# Patient Record
Sex: MALE | Race: WHITE | NOT HISPANIC OR LATINO | Employment: OTHER | URBAN - METROPOLITAN AREA
[De-identification: names, ages, dates, MRNs, and addresses within clinical notes are randomized per-mention and may not be internally consistent; named-entity substitution may affect disease eponyms.]

---

## 2019-07-13 PROBLEM — I63.412 STROKE DUE TO EMBOLISM OF LEFT MIDDLE CEREBRAL ARTERY: Status: ACTIVE | Noted: 2019-07-13

## 2019-07-14 ENCOUNTER — HOSPITAL ENCOUNTER (INPATIENT)
Facility: HOSPITAL | Age: 49
LOS: 15 days | Discharge: NURSING, REHAB OR LONG-TERM CARE | DRG: 023 | End: 2019-07-29
Attending: EMERGENCY MEDICINE | Admitting: ANESTHESIOLOGY
Payer: OTHER GOVERNMENT

## 2019-07-14 DIAGNOSIS — I48.0 PAF (PAROXYSMAL ATRIAL FIBRILLATION): ICD-10-CM

## 2019-07-14 DIAGNOSIS — G81.91 HEMIPARESIS, RIGHT: ICD-10-CM

## 2019-07-14 DIAGNOSIS — I63.412 STROKE DUE TO EMBOLISM OF LEFT MIDDLE CEREBRAL ARTERY: ICD-10-CM

## 2019-07-14 DIAGNOSIS — Z95.818 PRESENCE OF WATCHMAN LEFT ATRIAL APPENDAGE CLOSURE DEVICE: ICD-10-CM

## 2019-07-14 DIAGNOSIS — Z53.09 MRI CONTRAINDICATED DUE TO METAL IMPLANT: ICD-10-CM

## 2019-07-14 DIAGNOSIS — I63.9 CVA (CEREBRAL VASCULAR ACCIDENT): ICD-10-CM

## 2019-07-14 DIAGNOSIS — T85.9XXA: ICD-10-CM

## 2019-07-14 DIAGNOSIS — I63.9 EMBOLIC STROKE: ICD-10-CM

## 2019-07-14 DIAGNOSIS — R74.01 TRANSAMINITIS: ICD-10-CM

## 2019-07-14 DIAGNOSIS — Z00.00 EVALUATION BY MEDICAL SERVICE REQUIRED: ICD-10-CM

## 2019-07-14 DIAGNOSIS — R47.01 APHASIA: ICD-10-CM

## 2019-07-14 DIAGNOSIS — I63.412 CEREBRAL INFARCTION DUE TO EMBOLISM OF LEFT MIDDLE CEREBRAL ARTERY: Primary | ICD-10-CM

## 2019-07-14 DIAGNOSIS — I63.9 STROKE: ICD-10-CM

## 2019-07-14 PROBLEM — F10.10 ETOH ABUSE: Status: ACTIVE | Noted: 2019-07-14

## 2019-07-14 PROBLEM — Z92.82 S/P ADMN TPA IN DIFF FAC W/N LAST 24 HR BEF ADM TO CRNT FAC: Status: ACTIVE | Noted: 2019-07-14

## 2019-07-14 LAB
ABO GROUP BLD: NORMAL
ALBUMIN SERPL BCP-MCNC: 3.7 G/DL (ref 3.5–5.2)
ALLENS TEST: ABNORMAL
ALP SERPL-CCNC: 50 U/L (ref 55–135)
ALT SERPL W/O P-5'-P-CCNC: 20 U/L (ref 10–44)
ANION GAP SERPL CALC-SCNC: 10 MMOL/L (ref 8–16)
ASCENDING AORTA: 3.83 CM
AST SERPL-CCNC: 33 U/L (ref 10–40)
AV INDEX (PROSTH): 0.85
AV MEAN GRADIENT: 5 MMHG
AV PEAK GRADIENT: 8 MMHG
AV VALVE AREA: 4.02 CM2
AV VELOCITY RATIO: 0.86
BASOPHILS # BLD AUTO: 0.04 K/UL (ref 0–0.2)
BASOPHILS NFR BLD: 0.4 % (ref 0–1.9)
BILIRUB SERPL-MCNC: 0.4 MG/DL (ref 0.1–1)
BILIRUB UR QL STRIP: NEGATIVE
BLD GP AB SCN CELLS X3 SERPL QL: NORMAL
BSA FOR ECHO PROCEDURE: 2.19 M2
BUN SERPL-MCNC: 11 MG/DL (ref 6–20)
CALCIUM SERPL-MCNC: 8.8 MG/DL (ref 8.7–10.5)
CHLORIDE SERPL-SCNC: 104 MMOL/L (ref 95–110)
CHOLEST SERPL-MCNC: 159 MG/DL (ref 120–199)
CHOLEST/HDLC SERPL: 2.7 {RATIO} (ref 2–5)
CLARITY UR REFRACT.AUTO: CLEAR
CO2 SERPL-SCNC: 24 MMOL/L (ref 23–29)
COLOR UR AUTO: YELLOW
CREAT SERPL-MCNC: 1.1 MG/DL (ref 0.5–1.4)
CV ECHO LV RWT: 0.27 CM
DELSYS: ABNORMAL
DIFFERENTIAL METHOD: ABNORMAL
DOP CALC AO PEAK VEL: 1.38 M/S
DOP CALC AO VTI: 27 CM
DOP CALC LVOT AREA: 4.8 CM2
DOP CALC LVOT DIAMETER: 2.46 CM
DOP CALC LVOT PEAK VEL: 1.18 M/S
DOP CALC LVOT STROKE VOLUME: 108.45 CM3
DOP CALCLVOT PEAK VEL VTI: 22.83 CM
E WAVE DECELERATION TIME: 155.75 MSEC
E/A RATIO: 2.22
E/E' RATIO: 7.85 M/S
ECHO LV POSTERIOR WALL: 0.68 CM (ref 0.6–1.1)
EOSINOPHIL # BLD AUTO: 0 K/UL (ref 0–0.5)
EOSINOPHIL NFR BLD: 0.3 % (ref 0–8)
ERYTHROCYTE [DISTWIDTH] IN BLOOD BY AUTOMATED COUNT: 13.1 % (ref 11.5–14.5)
ERYTHROCYTE [SEDIMENTATION RATE] IN BLOOD BY WESTERGREN METHOD: 16 MM/H
EST. GFR  (AFRICAN AMERICAN): >60 ML/MIN/1.73 M^2
EST. GFR  (NON AFRICAN AMERICAN): >60 ML/MIN/1.73 M^2
ESTIMATED AVG GLUCOSE: 100 MG/DL (ref 68–131)
ETHANOL SERPL-MCNC: 15 MG/DL
FRACTIONAL SHORTENING: 36 % (ref 28–44)
GLUCOSE SERPL-MCNC: 102 MG/DL (ref 70–110)
GLUCOSE UR QL STRIP: NEGATIVE
HBA1C MFR BLD HPLC: 5.1 % (ref 4–5.6)
HCO3 UR-SCNC: 22.1 MMOL/L (ref 24–28)
HCT VFR BLD AUTO: 39.6 % (ref 40–54)
HDLC SERPL-MCNC: 58 MG/DL (ref 40–75)
HDLC SERPL: 36.5 % (ref 20–50)
HGB BLD-MCNC: 13.4 G/DL (ref 14–18)
HGB UR QL STRIP: NEGATIVE
IMM GRANULOCYTES # BLD AUTO: 0.08 K/UL (ref 0–0.04)
IMM GRANULOCYTES NFR BLD AUTO: 0.8 % (ref 0–0.5)
INTERVENTRICULAR SEPTUM: 0.65 CM (ref 0.6–1.1)
IVRT: 0.09 MSEC
KETONES UR QL STRIP: ABNORMAL
LA MAJOR: 5.05 CM
LA MINOR: 5.15 CM
LA WIDTH: 4.09 CM
LDLC SERPL CALC-MCNC: 91.6 MG/DL (ref 63–159)
LEFT ATRIUM SIZE: 3.19 CM
LEFT ATRIUM VOLUME INDEX: 25.7 ML/M2
LEFT ATRIUM VOLUME: 56.55 CM3
LEFT INTERNAL DIMENSION IN SYSTOLE: 3.19 CM (ref 2.1–4)
LEFT VENTRICLE DIASTOLIC VOLUME INDEX: 53.63 ML/M2
LEFT VENTRICLE DIASTOLIC VOLUME: 118.07 ML
LEFT VENTRICLE MASS INDEX: 49 G/M2
LEFT VENTRICLE SYSTOLIC VOLUME INDEX: 18.5 ML/M2
LEFT VENTRICLE SYSTOLIC VOLUME: 40.65 ML
LEFT VENTRICULAR INTERNAL DIMENSION IN DIASTOLE: 5 CM (ref 3.5–6)
LEFT VENTRICULAR MASS: 107.63 G
LEUKOCYTE ESTERASE UR QL STRIP: NEGATIVE
LV LATERAL E/E' RATIO: 7.85 M/S
LV SEPTAL E/E' RATIO: 7.85 M/S
LYMPHOCYTES # BLD AUTO: 0.8 K/UL (ref 1–4.8)
LYMPHOCYTES NFR BLD: 7.9 % (ref 18–48)
MAGNESIUM SERPL-MCNC: 1.8 MG/DL (ref 1.6–2.6)
MCH RBC QN AUTO: 32.8 PG (ref 27–31)
MCHC RBC AUTO-ENTMCNC: 33.8 G/DL (ref 32–36)
MCV RBC AUTO: 97 FL (ref 82–98)
MODE: ABNORMAL
MONOCYTES # BLD AUTO: 0.4 K/UL (ref 0.3–1)
MONOCYTES NFR BLD: 4.5 % (ref 4–15)
MV PEAK A VEL: 0.46 M/S
MV PEAK E VEL: 1.02 M/S
NEUTROPHILS # BLD AUTO: 8.3 K/UL (ref 1.8–7.7)
NEUTROPHILS NFR BLD: 86.1 % (ref 38–73)
NITRITE UR QL STRIP: NEGATIVE
NONHDLC SERPL-MCNC: 101 MG/DL
NRBC BLD-RTO: 0 /100 WBC
PCO2 BLDA: 33.4 MMHG (ref 35–45)
PH SMN: 7.43 [PH] (ref 7.35–7.45)
PH UR STRIP: 6 [PH] (ref 5–8)
PHOSPHATE SERPL-MCNC: 3.2 MG/DL (ref 2.7–4.5)
PISA TR MAX VEL: 2.51 M/S
PLATELET # BLD AUTO: 157 K/UL (ref 150–350)
PLATELET BLD QL SMEAR: ABNORMAL
PMV BLD AUTO: 10.3 FL (ref 9.2–12.9)
PO2 BLDA: 107 MMHG (ref 80–100)
POC BE: -2 MMOL/L
POC SATURATED O2: 98 % (ref 95–100)
POC TCO2: 23 MMOL/L (ref 23–27)
POCT GLUCOSE: 109 MG/DL (ref 70–110)
POCT GLUCOSE: 85 MG/DL (ref 70–110)
POCT GLUCOSE: 94 MG/DL (ref 70–110)
POCT GLUCOSE: 98 MG/DL (ref 70–110)
POTASSIUM SERPL-SCNC: 4.3 MMOL/L (ref 3.5–5.1)
PROT SERPL-MCNC: 6.5 G/DL (ref 6–8.4)
PROT UR QL STRIP: NEGATIVE
PULM VEIN S/D RATIO: 1.24
PV PEAK D VEL: 0.38 M/S
PV PEAK S VEL: 0.47 M/S
RA MAJOR: 5 CM
RA PRESSURE: 8 MMHG
RA WIDTH: 3.67 CM
RBC # BLD AUTO: 4.08 M/UL (ref 4.6–6.2)
RH BLD: NORMAL
RIGHT VENTRICULAR END-DIASTOLIC DIMENSION: 3.81 CM
RV TISSUE DOPPLER FREE WALL SYSTOLIC VELOCITY 1 (APICAL 4 CHAMBER VIEW): 15.92 CM/S
SAMPLE: ABNORMAL
SINUS: 3.8 CM
SITE: ABNORMAL
SODIUM SERPL-SCNC: 138 MMOL/L (ref 136–145)
SP GR UR STRIP: >=1.03 (ref 1–1.03)
SP02: 95
STJ: 3.25 CM
TDI LATERAL: 0.13 M/S
TDI SEPTAL: 0.13 M/S
TDI: 0.13 M/S
TR MAX PG: 25 MMHG
TRICUSPID ANNULAR PLANE SYSTOLIC EXCURSION: 2.58 CM
TRIGL SERPL-MCNC: 47 MG/DL (ref 30–150)
TSH SERPL DL<=0.005 MIU/L-ACNC: 0.53 UIU/ML (ref 0.4–4)
TV REST PULMONARY ARTERY PRESSURE: 33 MMHG
URN SPEC COLLECT METH UR: ABNORMAL
WBC # BLD AUTO: 9.69 K/UL (ref 3.9–12.7)

## 2019-07-14 PROCEDURE — 80320 DRUG SCREEN QUANTALCOHOLS: CPT

## 2019-07-14 PROCEDURE — 81003 URINALYSIS AUTO W/O SCOPE: CPT

## 2019-07-14 PROCEDURE — 63600175 PHARM REV CODE 636 W HCPCS: Performed by: NURSE PRACTITIONER

## 2019-07-14 PROCEDURE — 25000003 PHARM REV CODE 250: Performed by: NURSE PRACTITIONER

## 2019-07-14 PROCEDURE — 97530 THERAPEUTIC ACTIVITIES: CPT

## 2019-07-14 PROCEDURE — 96374 THER/PROPH/DIAG INJ IV PUSH: CPT

## 2019-07-14 PROCEDURE — 83036 HEMOGLOBIN GLYCOSYLATED A1C: CPT

## 2019-07-14 PROCEDURE — 99291 CRITICAL CARE FIRST HOUR: CPT | Mod: ,,, | Performed by: PSYCHIATRY & NEUROLOGY

## 2019-07-14 PROCEDURE — 94761 N-INVAS EAR/PLS OXIMETRY MLT: CPT

## 2019-07-14 PROCEDURE — 80053 COMPREHEN METABOLIC PANEL: CPT

## 2019-07-14 PROCEDURE — 63600175 PHARM REV CODE 636 W HCPCS: Performed by: RADIOLOGY

## 2019-07-14 PROCEDURE — 84443 ASSAY THYROID STIM HORMONE: CPT

## 2019-07-14 PROCEDURE — 99254 PR INITIAL INPATIENT CONSULT,LEVL IV: ICD-10-PCS | Mod: ,,, | Performed by: NEUROLOGICAL SURGERY

## 2019-07-14 PROCEDURE — 86850 RBC ANTIBODY SCREEN: CPT

## 2019-07-14 PROCEDURE — A4217 STERILE WATER/SALINE, 500 ML: HCPCS | Performed by: NURSE PRACTITIONER

## 2019-07-14 PROCEDURE — 99223 1ST HOSP IP/OBS HIGH 75: CPT | Mod: ,,, | Performed by: PSYCHIATRY & NEUROLOGY

## 2019-07-14 PROCEDURE — 96372 THER/PROPH/DIAG INJ SC/IM: CPT

## 2019-07-14 PROCEDURE — 99291 CRITICAL CARE FIRST HOUR: CPT | Mod: ,,, | Performed by: EMERGENCY MEDICINE

## 2019-07-14 PROCEDURE — 20000000 HC ICU ROOM

## 2019-07-14 PROCEDURE — 84100 ASSAY OF PHOSPHORUS: CPT

## 2019-07-14 PROCEDURE — 99223 PR INITIAL HOSPITAL CARE,LEVL III: ICD-10-PCS | Mod: ,,, | Performed by: PSYCHIATRY & NEUROLOGY

## 2019-07-14 PROCEDURE — 83735 ASSAY OF MAGNESIUM: CPT

## 2019-07-14 PROCEDURE — 97162 PT EVAL MOD COMPLEX 30 MIN: CPT

## 2019-07-14 PROCEDURE — 86900 BLOOD TYPING SEROLOGIC ABO: CPT

## 2019-07-14 PROCEDURE — 92610 EVALUATE SWALLOWING FUNCTION: CPT

## 2019-07-14 PROCEDURE — 36415 COLL VENOUS BLD VENIPUNCTURE: CPT

## 2019-07-14 PROCEDURE — 80061 LIPID PANEL: CPT

## 2019-07-14 PROCEDURE — 99285 EMERGENCY DEPT VISIT HI MDM: CPT | Mod: 25

## 2019-07-14 PROCEDURE — 86901 BLOOD TYPING SEROLOGIC RH(D): CPT

## 2019-07-14 PROCEDURE — 99291 PR CRITICAL CARE, E/M 30-74 MINUTES: ICD-10-PCS | Mod: ,,, | Performed by: PSYCHIATRY & NEUROLOGY

## 2019-07-14 PROCEDURE — 85025 COMPLETE CBC W/AUTO DIFF WBC: CPT

## 2019-07-14 PROCEDURE — 99291 PR CRITICAL CARE, E/M 30-74 MINUTES: ICD-10-PCS | Mod: ,,, | Performed by: EMERGENCY MEDICINE

## 2019-07-14 PROCEDURE — 25500020 PHARM REV CODE 255: Performed by: ANESTHESIOLOGY

## 2019-07-14 PROCEDURE — 99254 IP/OBS CNSLTJ NEW/EST MOD 60: CPT | Mod: ,,, | Performed by: NEUROLOGICAL SURGERY

## 2019-07-14 RX ORDER — THIAMINE HCL 100 MG
100 TABLET ORAL DAILY
Status: DISCONTINUED | OUTPATIENT
Start: 2019-07-14 | End: 2019-07-22

## 2019-07-14 RX ORDER — FENTANYL CITRATE 50 UG/ML
INJECTION, SOLUTION INTRAMUSCULAR; INTRAVENOUS CODE/TRAUMA/SEDATION MEDICATION
Status: COMPLETED | OUTPATIENT
Start: 2019-07-14 | End: 2019-07-14

## 2019-07-14 RX ORDER — FOLIC ACID 1 MG/1
1 TABLET ORAL DAILY
Status: DISCONTINUED | OUTPATIENT
Start: 2019-07-14 | End: 2019-07-22

## 2019-07-14 RX ORDER — ONDANSETRON 2 MG/ML
INJECTION INTRAMUSCULAR; INTRAVENOUS
Status: COMPLETED
Start: 2019-07-14 | End: 2019-07-14

## 2019-07-14 RX ORDER — MIDAZOLAM HYDROCHLORIDE 1 MG/ML
INJECTION INTRAMUSCULAR; INTRAVENOUS CODE/TRAUMA/SEDATION MEDICATION
Status: COMPLETED | OUTPATIENT
Start: 2019-07-14 | End: 2019-07-14

## 2019-07-14 RX ORDER — SODIUM CHLORIDE 0.9 % (FLUSH) 0.9 %
10 SYRINGE (ML) INJECTION
Status: DISCONTINUED | OUTPATIENT
Start: 2019-07-14 | End: 2019-07-29 | Stop reason: HOSPADM

## 2019-07-14 RX ORDER — IODIXANOL 320 MG/ML
100 INJECTION, SOLUTION INTRAVASCULAR
Status: COMPLETED | OUTPATIENT
Start: 2019-07-14 | End: 2019-07-14

## 2019-07-14 RX ORDER — NICARDIPINE HYDROCHLORIDE 0.2 MG/ML
5 INJECTION INTRAVENOUS CONTINUOUS
Status: DISCONTINUED | OUTPATIENT
Start: 2019-07-14 | End: 2019-07-18

## 2019-07-14 RX ORDER — MANNITOL 20 G/100ML
25 INJECTION, SOLUTION INTRAVENOUS EVERY 6 HOURS
Status: DISCONTINUED | OUTPATIENT
Start: 2019-07-15 | End: 2019-07-15

## 2019-07-14 RX ORDER — ONDANSETRON 2 MG/ML
8 INJECTION INTRAMUSCULAR; INTRAVENOUS EVERY 8 HOURS PRN
Status: DISCONTINUED | OUTPATIENT
Start: 2019-07-14 | End: 2019-07-29 | Stop reason: HOSPADM

## 2019-07-14 RX ORDER — MANNITOL 20 G/100ML
25 INJECTION, SOLUTION INTRAVENOUS ONCE
Status: COMPLETED | OUTPATIENT
Start: 2019-07-14 | End: 2019-07-14

## 2019-07-14 RX ORDER — ATORVASTATIN CALCIUM 20 MG/1
40 TABLET, FILM COATED ORAL DAILY
Status: DISCONTINUED | OUTPATIENT
Start: 2019-07-14 | End: 2019-07-24

## 2019-07-14 RX ADMIN — ATORVASTATIN CALCIUM 40 MG: 20 TABLET, FILM COATED ORAL at 08:07

## 2019-07-14 RX ADMIN — MIDAZOLAM HYDROCHLORIDE 2 MG: 1 INJECTION, SOLUTION INTRAMUSCULAR; INTRAVENOUS at 01:07

## 2019-07-14 RX ADMIN — FOLIC ACID: 5 INJECTION, SOLUTION INTRAMUSCULAR; INTRAVENOUS; SUBCUTANEOUS at 03:07

## 2019-07-14 RX ADMIN — IODIXANOL 35 ML: 320 INJECTION, SOLUTION INTRAVASCULAR at 02:07

## 2019-07-14 RX ADMIN — MANNITOL 25 G: 20 INJECTION, SOLUTION INTRAVENOUS at 07:07

## 2019-07-14 RX ADMIN — Medication 100 MG: at 08:07

## 2019-07-14 RX ADMIN — SODIUM CHLORIDE: 234 INJECTION INTRAMUSCULAR; INTRAVENOUS; SUBCUTANEOUS at 08:07

## 2019-07-14 RX ADMIN — FENTANYL CITRATE 50 MCG: 50 INJECTION, SOLUTION INTRAMUSCULAR; INTRAVENOUS at 01:07

## 2019-07-14 RX ADMIN — FOLIC ACID 1 MG: 1 TABLET ORAL at 08:07

## 2019-07-14 RX ADMIN — ONDANSETRON 8 MG: 2 INJECTION INTRAMUSCULAR; INTRAVENOUS at 05:07
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
## 2019-07-14 NOTE — ASSESSMENT & PLAN NOTE
48 y/o male with L MCA stroke due to M1 occlusion received IV TPA and thrombectomy to TICI 2C, probable due to afib    Antithrombotics: Need anticoagulation after TPA window    Statins: Lipitor 40 mg daily    Aggressive risk factor modification: A-Fib, alcohol     Rehab efforts: The patient has been evaluated by a stroke team provider and the therapy needs have been fully considered based off the presenting complaints and exam findings. The following therapy evaluations are needed: PT evaluate and treat, OT evaluate and treat, SLP evaluate and treat, PM&R evaluate for appropriate placement    Diagnostics ordered/pending: HgbA1C to assess blood glucose levels, Lipid Profile to assess cholesterol levels, MRI head without contrast to assess brain parenchyma, TTE to assess cardiac function/status     VTE prophylaxis: SCD's raquel begin Heparin 5000 units sc Q8H on 7-15-10 at 0600    BP parameters: Infarct: Post sucessful thrombectomy, SBP <140
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
## 2019-07-14 NOTE — PLAN OF CARE
Problem: SLP Goal  Goal: SLP Goal  Pt with evident right sided facial droop. Pt appearing safe for thin liquids (no straws) and soft solids. Speech to continue to follow.     Denita Boles MS, CCC-SLP  Speech Language Pathologist  Pager: [phone]  Date 7/14/2019

## 2019-07-14 NOTE — ASSESSMENT & PLAN NOTE
- s/p tPA ~2300  - CTA showed prox L MCA occlusion s/p thrombectomy w/ TICI2c flows post intervention  - vasc neurology following, appreciate recs  - admit to NCC  - HOB flat post procedure  - SBP <160  - q1 neurochecks  - hold anticoagulation  - MRI scheduled for tomorrow  - echo  - IVF (banana bag)  - f/u admit labs  - NPO till swallow eval  - PT/OT/ST

## 2019-07-14 NOTE — NURSING
Notified MD Jami of pt's vomiting episode. STAT CT and PRN Zofran ordered. Administered Zofran and CT done. Pt tolerated well. Will continue to monitor.

## 2019-07-14 NOTE — PLAN OF CARE
Problem: Physical Therapy Goal  Goal: Physical Therapy Goal  PT goals until 7/28/19    1. Pt supine to sit with minimal assist-not met  2. Pt sit to supine with minimal assist-not met  3. Pt sit to stand with LRAD with minimal assist-not met  4. Pt to perform gait 10ft with LRAD with max assist.-not met  5. Pt to go up/down curb step with LRAD with max assist.-not met  6. Pt to transfer bed to/from bedside chair with moderate assist.-not met  7. Pt to perform B LE exs in sitting or supine x 10 reps to strengthen B LE to improve functional mobility.-not met    Outcome: Ongoing (interventions implemented as appropriate)  Pt's goals set and pt will benefit from skilled PT services to work towards improved functional mobility including: bed mobility, transfers, and gait.   Nancy Smith, PT  7/14/2019

## 2019-07-14 NOTE — PROGRESS NOTES
" Pt cerebral angiogram with intervention complete. Right groin site CDI. VSS. Hemostasis achieved with use of 8 [place] Angioseal. closure device. HOB to be elevated at 0410. Bedside report given to  RN "SARWAT". Pt transferred  Via bed to room 9077 with RN "SARWAT".  "

## 2019-07-14 NOTE — ED PROVIDER NOTES
Encounter Date: 7/13/2019       History     Chief Complaint   Patient presents with    Transfer     from [facility] for stroke, coming for intervention.     HPI   49 y.o.    Known here of CVA    ETOH today    R sided weakness    To [facility]     Got tPA after telestroke    Transferred here    Review of patient's allergies indicates:  No Known Allergies  Past Medical History:   Diagnosis Date    CVA (cerebral infarction) 4/22/2016    Paroxysmal atrial fibrillation 4/22/2016     Past Surgical History:   Procedure Laterality Date    watchman device       Family History   Problem Relation Age of Onset    No Known Problems Mother     No Known Problems Father      Social History     Tobacco Use    Smoking status: Never Smoker    Smokeless tobacco: Never Used   Substance Use Topics    Alcohol use: Yes     Comment: 2 times per month    Drug use: No     Review of Systems  All systems were reviewed/examined and were negative except as noted in the HPI.    Physical Exam     Initial Vitals   BP Pulse Resp Temp SpO2   -- -- -- -- --      MAP       --       VS reviewed    Physical Exam    Gen: awake, alert, NAD  Head NCAT, sclera clear  Neck supple, no meningismus  Chest clear to auscultation, no respiratory distress  Heart RRR  ABD soft, nt  Back nt  Extremities W/WP no calf t  Skin w/d  Psych conversant  Neuro: A/A, GCS 15      ED Course   Procedures  Labs Reviewed - No data to display       Imaging Results          CT Head Without Contrast (In process)                    Medical Decision Making:    This is an emergent evaluation of a patient presenting to the ED.  Nursing notes were reviewed.  I personally reviewed, read, and interpreted the ECG and any monitoring strips.  ECG: normal sinus rhythm, no critical findings with intervals, normal rate, and no ischemia.  Compared with prior if available.    I reviewed radiology images personally along with interpretations.  CT head #1, will repeat  I personally  reviewed and interpreted the laboratory results.  Communicated with another physician regarding patient's care: stroke, neuro ICU    Iker Red MD, WINIFRED         Critical Care Time    Critical care time was provided for 30 minutes exclusive of other billable procedures and teaching time for the support of neuro organ system where the potential for death, shock, or further decline was possible.  Critical care time can include documentation, discussion with consultants, developing a care plan, as well as direct patient care.    Orestes Red MD                    Clinical Impression:       ICD-10-CM ICD-9-CM   1. Cerebral infarction due to embolism of left middle cerebral artery I63.412 434.11   2. CVA (cerebral vascular accident) I63.9 434.91   3. Stroke due to embolism of left middle cerebral artery I63.412 434.11          admit NICU, serious      Iker Red MD, WINIFRED, CPE, FACEP  Department of Emergency Medicine                        Orestes Red MD  07/15/19 0901

## 2019-07-14 NOTE — PROGRESS NOTES
Report received from HAWK Mejia. Unable to obtain baseline neuro exam per pt being prepped for procedure.
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
## 2019-07-14 NOTE — CONSULTS
"  [facility]  Adult Nutrition  Consult Note    SUMMARY     Recommendations    Recommendation/Intervention:   1. Continue current diet order as tolerated.    -If PO intake <50% add Boost Plus TID. Will monitor.   Goals: Pt to meet % EEN and EPN by RD follow-up.   Nutrition Goal Status: new  Communication of RD Recs: other (comment)(POC)    Reason for Assessment    Reason For Assessment: consult  Diagnosis: stroke/CVA  Relevant Medical History: ETOH abuse  General Information Comments: Remote assessment completed. Pt started on mechanical soft diet this AM, noted consumed 50% of lunch. Wt stable X 3 yrs per chart review. Unable to assess for malnutrition at this time.  Nutrition Discharge Planning: Adequate PO intake for optimal nutrition.     Nutrition/Diet History    Spiritual, Cultural Beliefs, Bahai Practices, Values that Affect Care: no  Factors Affecting Nutritional Intake: None identified at this time    Anthropometrics    Temp: 97.5 °F (36.4 °C)  Height: 6' 4" (193 cm)  Height (inches): 76 in  Weight Method: Bed Scale  Weight: 89.4 kg (197 lb)  Weight (lb): 197 lb  Ideal Body Weight (IBW), Male: 202 lb  % Ideal Body Weight, Male (lb): 97.52   BMI (Calculated): 24  BMI Grade: 18.5-24.9 - normal     Lab/Procedures/Meds    Pertinent Labs Reviewed: reviewed  Pertinent Medications Reviewed: reviewed  Pertinent Medications Comments: folic acid, thiamine    Estimated/Assessed Needs    Weight Used For Calorie Calculations: 89.4 kg (197 lb 1.5 oz)  Energy Calorie Requirements (kcal): 2318  Energy Need Method: [place]-St Jeor(1.25 PAL)  Protein Requirements: 89-107g (1-1.2g/kg)  Weight Used For Protein Calculations: 89.4 kg (197 lb 1.5 oz)  Fluid Requirements (mL): Per MD  RDA Method (mL): 2318     Nutrition Prescription Ordered    Current Diet Order: Mechanical soft    Evaluation of Received Nutrient/Fluid Intake    Comments: LBM 7/13  % Meal Intake: Other: noted 50% of lunch " today    Nutrition Risk    Level of Risk/Frequency of Follow-up: (1X/week)     Assessment and Plan    No nutrition related risk factor present at this time.    Monitor and Evaluation    Food and Nutrient Intake: energy intake, food and beverage intake  Food and Nutrient Adminstration: diet order  Anthropometric Measurements: weight, weight change  Biochemical Data, Medical Tests and Procedures: other (specify)(All labs)  Nutrition-Focused Physical Findings: overall appearance     Nutrition Follow-Up    RD Follow-up?: Yes

## 2019-07-14 NOTE — PLAN OF CARE
Problem: Adult Inpatient Plan of Care  Goal: Plan of Care Review  Outcome: Ongoing (interventions implemented as appropriate)  POC reviewed with pt and family at 1400. Pt verbalized understanding. Questions and concerns addressed with pt and wife and in agreement with POC. No acute events today. Mechanical dental soft diet started today. Sys <140 maintained with no PRN meds needed. MRI scheduled for tonight. Pt progressing toward goals. Will continue to monitor. See flowsheets for full assessment and VS info.

## 2019-07-14 NOTE — PT/OT/SLP EVAL
"Physical Therapy Evaluation    Patient Name:  Justyn Rawls   MRN:  [id number]    Recommendations:     Discharge Recommendations:  rehabilitation facility   Discharge Equipment Recommendations: (TBD as pt progresses)   Barriers to discharge: Inaccessible home and Decreased caregiver support 1 RAMIREZ and requires assist for mobility    Assessment:     Justyn Rawls is a 49 y.o. male admitted with a medical diagnosis of Stroke due to embolism of left middle cerebral artery.  He presents with the following impairments/functional limitations:  weakness, impaired endurance, impaired balance, impaired functional mobilty, impaired cognition, decreased lower extremity function, decreased upper extremity function, impaired sensation . Pt with R neglect noted in sitting and standing. Pt lethargic during treatment.     Rehab Prognosis: Fair; patient would benefit from acute skilled PT services to address these deficits and reach maximum level of function.    Recent Surgery: * No surgery found *      Plan:     During this hospitalization, patient to be seen 5 x/week to address the identified rehab impairments via therapeutic activities, gait training, therapeutic exercises, neuromuscular re-education and progress toward the following goals:    · Plan of Care Expires:  08/13/19    Subjective   "tired"    Pain/Comfort:  · Pain Rating 1: 0/10  · Pain Rating Post-Intervention 1: 0/10    Patients cultural, spiritual, Nondenominational conflicts given the current situation: no    Living Environment:  Pt lives with his wife and children in a 1 story home with 4 in step to enter.  Prior to admission, patients level of function was independent.  Equipment used at home: none. Upon discharge, patient will have assistance from family.    Objective:     Communicated with nurse prior to session.  Patient found supine with telemetry, pulse ox (continuous), peripheral IV, arterial line, blood pressure cuff, chowdhury catheter, SCD  upon PT entry to " room.    General Precautions: Standard, fall   Orthopedic Precautions:N/A   Braces: N/A     Exams:  · Cognitive Exam:  Patient is oriented to Person; pt inconsistent with following simple commands  · Sensation:    · -       Intact  light/touch L UE/LE (testing unreliable due to pt opened his eyes during testing and unable to state area palpated)  · -       Impaired  light/touch R UE/LE (appears absent to lt touch due to pt did not respond to touch, testing unreliable as above)  · RLE ROM: WFL  · RLE Strength: Deficits: active movement noted in hip/knee/and ankle at least 3-/5, pt unable to follow commands to further assess)  · LLE ROM: WFL  · LLE Strength: WFL    Functional Mobility:  · Bed Mobility:     · Supine to Sit: moderate assistance  · Sit to Supine: moderate assistance; pt left his R LE off the bed when bringing his L LE onto the bed  · Transfers:     · Sit to Stand:  maximal assistance with hand-held assist x 3 trials with manual cues to facilitate R knee ext in standing. Pt stood ~ 10-20 sec each trial with max assist due to R sided/posterior lean    Therapeutic Activities and Exercises:   pt sat on the EOB ~ 15 min between standing trials with CGA to max assist due to pt with LOB to the R and posterior at times.    AM-PAC 6 CLICK MOBILITY  Total Score:11     Patient left supine with all lines intact, call button in reach, nurse notified and family present.    GOALS:   Multidisciplinary Problems     Physical Therapy Goals        Problem: Physical Therapy Goal    Goal Priority Disciplines Outcome Goal Variances Interventions   Physical Therapy Goal     PT, PT/OT Ongoing (interventions implemented as appropriate)     Description:  PT goals until 7/28/19    1. Pt supine to sit with minimal assist-not met  2. Pt sit to supine with minimal assist-not met  3. Pt sit to stand with LRAD with minimal assist-not met  4. Pt to perform gait 10ft with LRAD with max assist.-not met  5. Pt to go up/down curb step with  LRAD with max assist.-not met  6. Pt to transfer bed to/from bedside chair with moderate assist.-not met  7. Pt to perform B LE exs in sitting or supine x 10 reps to strengthen B LE to improve functional mobility.-not met                      History:     Past Medical History:   Diagnosis Date    CVA (cerebral infarction) 4/22/2016    Paroxysmal atrial fibrillation 4/22/2016    Stroke        Past Surgical History:   Procedure Laterality Date    watchman device         Time Tracking:     PT Received On: 07/14/19  PT Start Time: 1356     PT Stop Time: 1420  PT Total Time (min): 24 min     Billable Minutes: Evaluation 14 and Therapeutic Activity 10      Nancy Smith, PT  07/14/2019

## 2019-07-14 NOTE — PLAN OF CARE
Problem: Adult Inpatient Plan of Care  Goal: Plan of Care Review  Outcome: Ongoing (interventions implemented as appropriate)  No acute events throughout shift, pt is s/p thrombectomy, right groin incision without s/s of bleeding. Pt supine for two hours. neurochecks performed, assessment per flow sheet. Continues to have right side facial droop with slight slurring, more comprehensible. RUE without movement, has slight sensation in hand, but not forearms or upper arms. BLE and LUE moves spontaneously and well. PERRL 4mm. No c/o headache during shift. oriented to self, occasionally oriented to place and time. Room air, no s/s of resp distress. VS and assessment per flow sheet, patient progressing towards goals as tolerated, plan of care reviewed with Justyn Rawls and family, all concerns addressed, will continue to monitor.
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
## 2019-07-14 NOTE — NURSING
Patient arrived to [facility] from [facility]>IR>[facility]  Type of stroke/diagnosis:  L MCA ischemic stroke    TPA start and end time (if applicable) 6466-3053    Thrombectomy start and end time (if applicable) 4265-4860    Current symptoms: Right sided weakness, L gaze, slurred speech, R facial droop    Skin assessment done: Yes  Wounds noted: none    NCC notified: MD Fuad at bedside    Will continue to monitor and treat per POC

## 2019-07-14 NOTE — H&P
Inpatient Radiology Pre-procedure Note    History of Present Illness:  Justyn Rawls is a 49 y.o. male who presents for cerebral angiogram for treatment of left MCA stroke.  He had acute right sided weakness and aphasia tonight and received tPA at [facility].  His CTA showed left MCA occlusion and he was transferred to [facility].  Admission H&P reviewed.  Past Medical History:   Diagnosis Date    CVA (cerebral infarction) 4/22/2016    Paroxysmal atrial fibrillation 4/22/2016     Past Surgical History:   Procedure Laterality Date    watchman device         Review of Systems:   As documented in primary team H&P    Home Meds:   Prior to Admission medications    Not on File     Scheduled Meds:   Continuous Infusions:   PRN Meds:  Anticoagulants/Antiplatelets: tpa    Allergies: Review of patient's allergies indicates:  No Known Allergies  Sedation Hx: have not been any systemic reactions    Labs:  No results for input(s): INR in the last 168 hours.    Invalid input(s):  PT,  PTT    Recent Labs   Lab 07/13/19  2214   WBC 6.29   HGB 13.1*   HCT 37.9*   MCV 95         Recent Labs   Lab 07/13/19  2214   GLU 90      K 3.8      CO2 21*   BUN 11   CREATININE 1.13   CALCIUM 8.4   ALT 24   AST 28   ALBUMIN 4.0   BILITOT 0.3         Vitals:  Pulse: 70 (07/14/19 0107)  Resp: 16 (07/14/19 0107)  BP: 123/82 (07/14/19 0107)  SpO2: 98 % (07/14/19 0107)     Physical Exam:  ASA: 2  Mallampati: 3    General: no acute distress  Mental Status: alert and oriented to person, place and time  HEENT: normocephalic, atraumatic  Chest: unlabored breathing  Heart: regular heart rate  Abdomen: nondistended  Extremity: moves all extremities    Plan: Cerebral angiogram and intervention  Sedation Plan: light  Informed consent obtained from patient's wife    Tyler Mccarty MD  Attending Radiologist  Interventional Neuroradiology
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
## 2019-07-14 NOTE — PROGRESS NOTES
Procedure tracking times documented in neuro tracking and procedural tracking.flow sheets.     Pt arrived: 0125    MD in room: 0130    MD access: 0149    1st Pass: 0155    Tiki 2B: 0158    Tiki 2C: 1404

## 2019-07-14 NOTE — PLAN OF CARE
Problem: Adult Inpatient Plan of Care  Goal: Plan of Care Review  Outcome: Ongoing (interventions implemented as appropriate)  Recommendations    Recommendation/Intervention:   1. Continue current diet order as tolerated.    -If PO intake <50% add Boost Plus TID. Will monitor.   Goals: Pt to meet % EEN and EPN by RD follow-up.

## 2019-07-14 NOTE — PROCEDURES
Radiology Post-Procedure Note    Pre Op Diagnosis: left MCA stroke    Post Op Diagnosis: same    Procedure: Cerebral angiogram    Procedure performed by: Tyler Mccarty MD    Written Informed Consent Obtained: Yes, phone consent from wife  Specimen Removed: YES multiple clots from left MCA    Estimated Blood Loss: less than 50     Procedure report:     8F sheath was placed into the right femoral artery and a Neuron Max sheath was used to perform angiogragraphy of the LCCA and LICA.  Left M1 origin occlusion was seen, so spiration thrombectomy was performed using the ACE 68 aspiration system.  2 passes were performed with a TICI 2C result in the left MCA.  Please see Imaging report for full details.    A right femoral artery angiogram was performed, the sheath removed and hemostasis achieved using Angioseal.  No hematoma was present at the time of hemostasis.    The patient tolerated the procedure well.     Groin puncture was 1:49 AM  1st Pass 1:56 TICI 2B  2nd pass 2:07 TICI 2C    Tyler Mccarty MD  Attending Radiologist  Interventional Neuroradiology

## 2019-07-14 NOTE — SUBJECTIVE & OBJECTIVE
Past Medical History:   Diagnosis Date    CVA (cerebral infarction) 4/22/2016    Paroxysmal atrial fibrillation 4/22/2016     Past Surgical History:   Procedure Laterality Date    watchman device       Family History   Problem Relation Age of Onset    No Known Problems Mother     No Known Problems Father      Social History     Tobacco Use    Smoking status: Never Smoker    Smokeless tobacco: Never Used   Substance Use Topics    Alcohol use: Yes     Comment: 2 times per month    Drug use: No     Review of patient's allergies indicates:  No Known Allergies    Medications: I have reviewed the current medication administration record.      (Not in a hospital admission)    Review of Systems   Unable to perform ROS: Other     Objective:     Vital Signs (Most Recent):  Pulse: (!) 59 (07/14/19 0208)  Resp: 16 (07/14/19 0208)  BP: 125/81 (07/14/19 0208)  SpO2: 100 % (07/14/19 0208)    Vital Signs Range (Last 24H):  Temp:  [97.6 °F (36.4 °C)]   Pulse:  [54-87]   Resp:  [13-17]   BP: (106-135)/(74-89)   SpO2:  [96 %-100 %]     Physical Exam   Constitutional: He appears well-developed and well-nourished.   HENT:   Head: Normocephalic and atraumatic.   Eyes: Pupils are equal, round, and reactive to light. EOM are normal.   Neck: Normal range of motion.   Cardiovascular: Normal rate.   Pulmonary/Chest: Effort normal.   Abdominal: Soft.   Neurological:   Right sided weakness, facial droop, aphasia   Skin: Skin is warm and dry.   Nursing note and vitals reviewed.      Neurological Exam:   LOC: alert  Attention Span: poor  Language: Expressive aphasia  Articulation: Dysarthria  Orientation: Untestable due to severe aphasia   Visual Fields: Full  EOM (CN III, IV, VI): Gaze preference  left  Pupils (CN II, III): PERRL  Facial Sensation (CN V): Normal  Facial Movement (CN VII): Lower facial weakness on the Right  Gag Reflex: present  Reflexes: flexor plantar responses bilaterally  Motor: Arm left  Normal 5/5  Leg left   Normal 5/5  Arm right  Plegia 0/5  Leg right Paresis: 2/5  Cebellar: No evidence of appendicular or axial ataxia  Sensation: Intact to light touch, temperature and vibration  Tone: Flaccid  RLE       Laboratory:  CMP:   Recent Labs   Lab 07/13/19  2214   CALCIUM 8.4   ALBUMIN 4.0   PROT 6.5      K 3.8   CO2 21*      BUN 11   CREATININE 1.13   ALKPHOS 40   ALT 24   AST 28   BILITOT 0.3     CBC:   Recent Labs   Lab 07/13/19  2214   WBC 6.29   RBC 4.00*   HGB 13.1*   HCT 37.9*      MCV 95   MCH 32.8*   MCHC 34.6     Lipid Panel: No results for input(s): CHOL, LDLCALC, HDL, TRIG in the last 168 hours.  Coagulation:   Recent Labs   Lab 07/13/19 2214   APTT 23.9*     Hgb A1C: No results for input(s): HGBA1C in the last 168 hours.  TSH: No results for input(s): TSH in the last 168 hours.    Diagnostic Results:      Brain imaging:  CT head w/o contrast 7-13-19 results:  No acute intracranial findings.    CT head w/o contrast 7-14-19 results:  Chronic changes are noted there is no evidence for superimposed acute intracranial process.    Vessel Imaging:  CTA Head and neck multiphase 7-13-19 results:    Cardiac Evaluation:   EKG 7-13-19 results:

## 2019-07-14 NOTE — PT/OT/SLP EVAL
Speech Language Pathology Evaluation  Bedside Swallow    Patient Name:  Justyn Rawls   MRN:  [id number]  Admitting Diagnosis: Stroke due to embolism of left middle cerebral artery    Recommendations:                 General Recommendations:  Dysphagia therapy, Speech language evaluation and Cognitive-linguistic evaluation  Diet recommendations:  Dental Soft, Thin   Aspiration Precautions: Check for pocketing/oral residue, Meds whole 1 at a time, No straws and Standard aspiration precautions   General Precautions: Standard,    Communication strategies:  none    History:     Past Medical History:   Diagnosis Date    CVA (cerebral infarction) 4/22/2016    Paroxysmal atrial fibrillation 4/22/2016    Stroke        Past Surgical History:   Procedure Laterality Date    watchman device         Social History: Patient lives with spouse    Prior Intubation HX:  None this admission     Modified Barium Swallow: none this admission     Chest X-Rays: N/A    Prior diet: regular solids and thin liquids     Subjective     Pt significantly lethargic, sister and spouse at the bedside     Pain/Comfort:  Pain Rating 1: 0/10  Pain Rating Post-Intervention 1: 0/10    Objective:     Oral Musculature Evaluation  Oral Musculature: gross asymmetry present, right weakness  Dentition: present and adequate  Oral Labial Strength and Mobility: impaired retraction, impaired pursing, impaired coordination  Lingual Strength and Mobility: impaired strength, impaired right lateral movement, impaired anterior elevation  Volitional Swallow: prompt ; adeqaute hyolaryngeal rise   Voice Prior to PO Intake: strong and clear     Bedside Swallow Eval:   Consistencies Assessed:  · Thin liquids 5oz large sips at a time from cup edge   · Puree 3oz via full tsp   · Solids x5     Oral Phase:   · Pocketing   Right mild right pocketing/residual cleared with liqiud wash    · Mild right sided oral loss/dripping with large cup sips, pt with large impulsive sips  size advised not to use straws at this time   · SLP discussed with family to monitor for right sided pocketing and to ensure pt managing meals when awake and alert given drowsy state this date     Pharyngeal Phase:   · no overt clinical signs/symptoms of aspiration  · no overt clinical signs/symptoms of pharyngeal dysphagia    Compensatory Strategies  · None    Treatment:  Education provided to Pt re: SLP role in acute care setting, overall impressions and therapeutic goals. Whiteboard updated.      Assessment:     Justyn Rawls is a 49 y.o. male with an SLP diagnosis of Dysphagia and Dysarthria.  Ongoing speech-language and cognitive assessment warranted .    Goals:   Multidisciplinary Problems     SLP Goals        Problem: SLP Goal    Goal Priority Disciplines Outcome   SLP Goal     SLP    Description:  Speech Language Pathology Goals  Goals expected to be met by 7/21    1. Pt will tolerate diet of thin liquids and dental soft solids without overt clinical signs of aspiration   2. Pt will participate in ongoing assessment of speech language and cognitive linguistic skills to help rule out deficits and determine therapeutic plan of care                         Plan:     · Patient to be seen:  4 x/week   · Plan of Care expires:     · Plan of Care reviewed with:      · SLP Follow-Up:  Yes       Discharge recommendations:  nursing facility, skilled   Barriers to Discharge:  Safety Awareness      Time Tracking:     SLP Treatment Date:   07/14/19  Speech Start Time:  0935  Speech Stop Time:  0947     Speech Total Time (min):  12 min    Billable Minutes: Eval Swallow and Oral Function 12    Denita Boles CCC-SLP  07/14/2019

## 2019-07-14 NOTE — ASSESSMENT & PLAN NOTE
- s/p JM closure in 2017  - prev on xarelto, unknown if currently taking  - hold anticoagulation  - telemetry

## 2019-07-15 LAB
ALBUMIN SERPL BCP-MCNC: 3.2 G/DL (ref 3.5–5.2)
ALLENS TEST: ABNORMAL
ALLENS TEST: ABNORMAL
ALP SERPL-CCNC: 50 U/L (ref 55–135)
ALT SERPL W/O P-5'-P-CCNC: 17 U/L (ref 10–44)
ANION GAP SERPL CALC-SCNC: 12 MMOL/L (ref 8–16)
APTT BLDCRRT: 22.9 SEC (ref 21–32)
AST SERPL-CCNC: 29 U/L (ref 10–40)
BASOPHILS # BLD AUTO: 0.03 K/UL (ref 0–0.2)
BASOPHILS NFR BLD: 0.3 % (ref 0–1.9)
BILIRUB SERPL-MCNC: 0.4 MG/DL (ref 0.1–1)
BUN SERPL-MCNC: 8 MG/DL (ref 6–20)
CALCIUM SERPL-MCNC: 8.2 MG/DL (ref 8.7–10.5)
CHLORIDE SERPL-SCNC: 106 MMOL/L (ref 95–110)
CO2 SERPL-SCNC: 21 MMOL/L (ref 23–29)
CREAT SERPL-MCNC: 1 MG/DL (ref 0.5–1.4)
DELSYS: ABNORMAL
DELSYS: ABNORMAL
DIFFERENTIAL METHOD: ABNORMAL
EOSINOPHIL # BLD AUTO: 0 K/UL (ref 0–0.5)
EOSINOPHIL NFR BLD: 0.1 % (ref 0–8)
ERYTHROCYTE [DISTWIDTH] IN BLOOD BY AUTOMATED COUNT: 13.2 % (ref 11.5–14.5)
EST. GFR  (AFRICAN AMERICAN): >60 ML/MIN/1.73 M^2
EST. GFR  (NON AFRICAN AMERICAN): >60 ML/MIN/1.73 M^2
FIO2: 21
GLUCOSE SERPL-MCNC: 96 MG/DL (ref 70–110)
HCO3 UR-SCNC: 21.7 MMOL/L (ref 24–28)
HCO3 UR-SCNC: 22.6 MMOL/L (ref 24–28)
HCT VFR BLD AUTO: 38.5 % (ref 40–54)
HGB BLD-MCNC: 12.6 G/DL (ref 14–18)
IMM GRANULOCYTES # BLD AUTO: 0.05 K/UL (ref 0–0.04)
IMM GRANULOCYTES NFR BLD AUTO: 0.6 % (ref 0–0.5)
INR PPP: 1.1 (ref 0.8–1.2)
LYMPHOCYTES # BLD AUTO: 1 K/UL (ref 1–4.8)
LYMPHOCYTES NFR BLD: 11.7 % (ref 18–48)
MAGNESIUM SERPL-MCNC: 1.9 MG/DL (ref 1.6–2.6)
MCH RBC QN AUTO: 32.2 PG (ref 27–31)
MCHC RBC AUTO-ENTMCNC: 32.7 G/DL (ref 32–36)
MCV RBC AUTO: 99 FL (ref 82–98)
MODE: ABNORMAL
MODE: ABNORMAL
MONOCYTES # BLD AUTO: 0.8 K/UL (ref 0.3–1)
MONOCYTES NFR BLD: 8.7 % (ref 4–15)
NEUTROPHILS # BLD AUTO: 6.8 K/UL (ref 1.8–7.7)
NEUTROPHILS NFR BLD: 78.6 % (ref 38–73)
NRBC BLD-RTO: 0 /100 WBC
OSMOLALITY SERPL: 291 MOSM/KG (ref 280–300)
PCO2 BLDA: 35.4 MMHG (ref 35–45)
PCO2 BLDA: 37.2 MMHG (ref 35–45)
PH SMN: 7.39 [PH] (ref 7.35–7.45)
PH SMN: 7.39 [PH] (ref 7.35–7.45)
PHOSPHATE SERPL-MCNC: 3 MG/DL (ref 2.7–4.5)
PLATELET # BLD AUTO: 136 K/UL (ref 150–350)
PMV BLD AUTO: 10.1 FL (ref 9.2–12.9)
PO2 BLDA: 85 MMHG (ref 80–100)
PO2 BLDA: 92 MMHG (ref 80–100)
POC BE: -2 MMOL/L
POC BE: -3 MMOL/L
POC SATURATED O2: 96 % (ref 95–100)
POC SATURATED O2: 97 % (ref 95–100)
POC TCO2: 23 MMOL/L (ref 23–27)
POC TCO2: 24 MMOL/L (ref 23–27)
POCT GLUCOSE: 100 MG/DL (ref 70–110)
POCT GLUCOSE: 103 MG/DL (ref 70–110)
POCT GLUCOSE: 154 MG/DL (ref 70–110)
POCT GLUCOSE: 180 MG/DL (ref 70–110)
POCT GLUCOSE: 96 MG/DL (ref 70–110)
POTASSIUM SERPL-SCNC: 4 MMOL/L (ref 3.5–5.1)
PROT SERPL-MCNC: 5.9 G/DL (ref 6–8.4)
PROTHROMBIN TIME: 11 SEC (ref 9–12.5)
RBC # BLD AUTO: 3.91 M/UL (ref 4.6–6.2)
SAMPLE: ABNORMAL
SAMPLE: ABNORMAL
SITE: ABNORMAL
SITE: ABNORMAL
SODIUM SERPL-SCNC: 139 MMOL/L (ref 136–145)
SODIUM SERPL-SCNC: 141 MMOL/L (ref 136–145)
SODIUM SERPL-SCNC: 141 MMOL/L (ref 136–145)
SP02: 100
SP02: 99
WBC # BLD AUTO: 8.61 K/UL (ref 3.9–12.7)

## 2019-07-15 PROCEDURE — 84295 ASSAY OF SERUM SODIUM: CPT | Mod: 91

## 2019-07-15 PROCEDURE — 63600175 PHARM REV CODE 636 W HCPCS: Performed by: NURSE PRACTITIONER

## 2019-07-15 PROCEDURE — 36620 ARTERIAL LINE: ICD-10-PCS | Mod: ,,, | Performed by: NURSE PRACTITIONER

## 2019-07-15 PROCEDURE — 85610 PROTHROMBIN TIME: CPT

## 2019-07-15 PROCEDURE — 99291 CRITICAL CARE FIRST HOUR: CPT | Mod: ,,, | Performed by: PSYCHIATRY & NEUROLOGY

## 2019-07-15 PROCEDURE — 99232 SBSQ HOSP IP/OBS MODERATE 35: CPT | Mod: ,,, | Performed by: NEUROLOGICAL SURGERY

## 2019-07-15 PROCEDURE — 92523 SPEECH SOUND LANG COMPREHEN: CPT

## 2019-07-15 PROCEDURE — 63600175 PHARM REV CODE 636 W HCPCS: Performed by: PHYSICIAN ASSISTANT

## 2019-07-15 PROCEDURE — A4217 STERILE WATER/SALINE, 500 ML: HCPCS | Performed by: PHYSICIAN ASSISTANT

## 2019-07-15 PROCEDURE — 85025 COMPLETE CBC W/AUTO DIFF WBC: CPT

## 2019-07-15 PROCEDURE — A4217 STERILE WATER/SALINE, 500 ML: HCPCS | Performed by: NURSE PRACTITIONER

## 2019-07-15 PROCEDURE — 99232 PR SUBSEQUENT HOSPITAL CARE,LEVL II: ICD-10-PCS | Mod: ,,, | Performed by: NEUROLOGICAL SURGERY

## 2019-07-15 PROCEDURE — 25000003 PHARM REV CODE 250: Performed by: NURSE PRACTITIONER

## 2019-07-15 PROCEDURE — 97165 OT EVAL LOW COMPLEX 30 MIN: CPT

## 2019-07-15 PROCEDURE — 83930 ASSAY OF BLOOD OSMOLALITY: CPT

## 2019-07-15 PROCEDURE — 36620 INSERTION CATHETER ARTERY: CPT | Mod: ,,, | Performed by: NURSE PRACTITIONER

## 2019-07-15 PROCEDURE — 80053 COMPREHEN METABOLIC PANEL: CPT

## 2019-07-15 PROCEDURE — 99233 PR SUBSEQUENT HOSPITAL CARE,LEVL III: ICD-10-PCS | Mod: ,,, | Performed by: PSYCHIATRY & NEUROLOGY

## 2019-07-15 PROCEDURE — 94761 N-INVAS EAR/PLS OXIMETRY MLT: CPT

## 2019-07-15 PROCEDURE — 25000003 PHARM REV CODE 250: Performed by: PHYSICIAN ASSISTANT

## 2019-07-15 PROCEDURE — 82803 BLOOD GASES ANY COMBINATION: CPT

## 2019-07-15 PROCEDURE — 99233 SBSQ HOSP IP/OBS HIGH 50: CPT | Mod: ,,, | Performed by: PSYCHIATRY & NEUROLOGY

## 2019-07-15 PROCEDURE — 20000000 HC ICU ROOM

## 2019-07-15 PROCEDURE — 84100 ASSAY OF PHOSPHORUS: CPT

## 2019-07-15 PROCEDURE — 83735 ASSAY OF MAGNESIUM: CPT

## 2019-07-15 PROCEDURE — 25000003 PHARM REV CODE 250

## 2019-07-15 PROCEDURE — 99900035 HC TECH TIME PER 15 MIN (STAT)

## 2019-07-15 PROCEDURE — 85730 THROMBOPLASTIN TIME PARTIAL: CPT

## 2019-07-15 PROCEDURE — 99291 PR CRITICAL CARE, E/M 30-74 MINUTES: ICD-10-PCS | Mod: ,,, | Performed by: PSYCHIATRY & NEUROLOGY

## 2019-07-15 PROCEDURE — 97112 NEUROMUSCULAR REEDUCATION: CPT

## 2019-07-15 PROCEDURE — 97530 THERAPEUTIC ACTIVITIES: CPT

## 2019-07-15 PROCEDURE — 37799 UNLISTED PX VASCULAR SURGERY: CPT

## 2019-07-15 PROCEDURE — 36620 INSERTION CATHETER ARTERY: CPT

## 2019-07-15 RX ORDER — MANNITOL 20 G/100ML
25 INJECTION, SOLUTION INTRAVENOUS EVERY 6 HOURS
Status: COMPLETED | OUTPATIENT
Start: 2019-07-15 | End: 2019-07-15

## 2019-07-15 RX ORDER — LIDOCAINE HYDROCHLORIDE 10 MG/ML
INJECTION, SOLUTION EPIDURAL; INFILTRATION; INTRACAUDAL; PERINEURAL
Status: COMPLETED
Start: 2019-07-15 | End: 2019-07-15

## 2019-07-15 RX ORDER — AMOXICILLIN 250 MG
1 CAPSULE ORAL 2 TIMES DAILY
Status: DISCONTINUED | OUTPATIENT
Start: 2019-07-15 | End: 2019-07-29 | Stop reason: HOSPADM

## 2019-07-15 RX ORDER — ACETAMINOPHEN 325 MG/1
650 TABLET ORAL EVERY 6 HOURS PRN
Status: DISCONTINUED | OUTPATIENT
Start: 2019-07-15 | End: 2019-07-29 | Stop reason: HOSPADM

## 2019-07-15 RX ORDER — LIDOCAINE HYDROCHLORIDE 10 MG/ML
1 INJECTION INFILTRATION; PERINEURAL ONCE
Status: COMPLETED | OUTPATIENT
Start: 2019-07-15 | End: 2019-07-15

## 2019-07-15 RX ADMIN — SODIUM CHLORIDE: 234 INJECTION INTRAMUSCULAR; INTRAVENOUS; SUBCUTANEOUS at 07:07

## 2019-07-15 RX ADMIN — ONDANSETRON 8 MG: 2 INJECTION INTRAMUSCULAR; INTRAVENOUS at 08:07

## 2019-07-15 RX ADMIN — MANNITOL 25 G: 20 INJECTION, SOLUTION INTRAVENOUS at 11:07

## 2019-07-15 RX ADMIN — ATORVASTATIN CALCIUM 40 MG: 20 TABLET, FILM COATED ORAL at 08:07

## 2019-07-15 RX ADMIN — MANNITOL 25 G: 20 INJECTION, SOLUTION INTRAVENOUS at 12:07

## 2019-07-15 RX ADMIN — MANNITOL 25 G: 20 INJECTION, SOLUTION INTRAVENOUS at 05:07

## 2019-07-15 RX ADMIN — SODIUM CHLORIDE: 234 INJECTION INTRAMUSCULAR; INTRAVENOUS; SUBCUTANEOUS at 11:07

## 2019-07-15 RX ADMIN — SODIUM CHLORIDE: 234 INJECTION INTRAMUSCULAR; INTRAVENOUS; SUBCUTANEOUS at 04:07

## 2019-07-15 RX ADMIN — SODIUM CHLORIDE: 234 INJECTION INTRAMUSCULAR; INTRAVENOUS; SUBCUTANEOUS at 03:07

## 2019-07-15 RX ADMIN — SENNOSIDES,DOCUSATE SODIUM 1 TABLET: 8.6; 5 TABLET, FILM COATED ORAL at 08:07

## 2019-07-15 RX ADMIN — LIDOCAINE HYDROCHLORIDE 1 ML: 10 INJECTION, SOLUTION EPIDURAL; INFILTRATION; INTRACAUDAL; PERINEURAL at 02:07

## 2019-07-15 RX ADMIN — Medication 100 MG: at 08:07

## 2019-07-15 RX ADMIN — LIDOCAINE HYDROCHLORIDE 1 ML: 10 INJECTION INFILTRATION; PERINEURAL at 02:07

## 2019-07-15 RX ADMIN — SENNOSIDES,DOCUSATE SODIUM 1 TABLET: 8.6; 5 TABLET, FILM COATED ORAL at 11:07

## 2019-07-15 RX ADMIN — FOLIC ACID 1 MG: 1 TABLET ORAL at 08:07

## 2019-07-15 RX ADMIN — ACETAMINOPHEN 650 MG: 325 TABLET ORAL at 05:07

## 2019-07-15 NOTE — PT/OT/SLP PROGRESS
"Physical Therapy Treatment    Patient Name:  Justyn Rawls   MRN:  [id number]    Recommendations:     Discharge Recommendations:  rehabilitation facility   Discharge Equipment Recommendations: none   Barriers to discharge: Decreased caregiver support    Assessment:     Justyn Rawls is a 49 y.o. male admitted with a medical diagnosis of Stroke due to embolism of left middle cerebral artery.  He presents with the following impairments/functional limitations:  weakness, gait instability, impaired endurance, decreased coordination, impaired balance, decreased upper extremity function, decreased lower extremity function, impaired functional mobilty, impaired cognition, decreased safety awareness, impaired cardiopulmonary response to activity.    During today's session, pt with improvement with seated balance and standing with req less physical assistance to perform and sustain action. Pt with continued R UE weakness, R sided inattention, and aphasia affecting ability to follow one step commands and to maintain midline orientation. Pt appropriate for discharge to IP Rehab to maximize return to PLOF. Able to tolerate 3 hrs of therapy.    Rehab Prognosis: Good; patient would benefit from acute skilled PT services to address these deficits and reach maximum level of function.    Recent Surgery: * No surgery found *      Plan:     During this hospitalization, patient to be seen 5 x/week to address the identified rehab impairments via gait training, therapeutic activities, therapeutic exercises, neuromuscular re-education and progress toward the following goals:    · Plan of Care Expires:  08/13/19    Subjective     Chief Complaint: fatigue   Patient/Family Comments/goals: "I just want to lay down." per pt during session.   Pain/Comfort:  · Pain Rating 1: (unable to rate)  · Location - Side 1: Right  · Location 1: arm  · Pain Addressed 1: Reposition, Distraction      Objective:     Communicated with nsg prior to session.  " Patient found supine with arterial line, bed alarm, blood pressure cuff, telemetry, SCD, pulse ox (continuous), peripheral IV upon PT entry to room.     General Precautions: Standard, aphasia, fall   Orthopedic Precautions:N/A   Braces: N/A       Functional Mobility  Bed Mobility  Supine to Sit: moderate assistance   Sit to Supine: moderate assistance   Rolling to L: minimal assistance with cueing provided to control movement.  Bridging: SBA  Scooting upward in better: SBA with cueing provided for safety awareness   Transfers Sit to Stand:  moderate assistance with hand-held assist for 1 trials     Gait Weight-shifting initiated with pt unable to follow commands appropriately         Balance   Static Sitting contact guard assistance and minimum assistance   - Pt sat EOB for 15 minutes with incr L sided preferred visual field, slight R lateral trunk lean, poor thoracic expansion, slight L UE contralateral pushing towards R with cueing provided for hand to be placed on lap to prevent this.  - Pt able to follow cueing for thoracic expansion and anterior pelvic tilt to assist with posture.    Dynamic Sitting minimum assistance   Static Standing moderate assistance   - decr R knee extension with cueing provided for knee extension and hip extension.    Dynamic Standing moderate assistance         AM-PAC 6 CLICK MOBILITY  Turning over in bed (including adjusting bedclothes, sheets and blankets)?: 3  Sitting down on and standing up from a chair with arms (e.g., wheelchair, bedside commode, etc.): 2  Moving from lying on back to sitting on the side of the bed?: 2  Moving to and from a bed to a chair (including a wheelchair)?: 2  Need to walk in hospital room?: 1  Climbing 3-5 steps with a railing?: 1  Basic Mobility Total Score: 11       Therapeutic Activities and Exercises:   EDUCATION  Pt educated pt on incr OOB activity including sitting in bedside chair majority of day, utilizing bedpan for toileting needs  Educated pt  on being appropriate to transfer with nsg and PCT with 2 person assistance.  Updated white board with appropriate PT mobility information for medical team notification  Pt verbalized agreement.     PT provided additional education regarding course of hospital stay with CM/SW intervention for placement as well as 2 tasks to perform to assist with recovery ( R sided interaction, one step command). Family member asked about R UE usage and performance with family with PT explaining current precautions being arterial line.     Patient left HOB elevated with all lines intact, call button in reach and bed alarm on..    GOALS:   Multidisciplinary Problems     Physical Therapy Goals        Problem: Physical Therapy Goal    Goal Priority Disciplines Outcome Goal Variances Interventions   Physical Therapy Goal     PT, PT/OT Ongoing (interventions implemented as appropriate)     Description:  PT goals until 7/28/19    1. Pt supine to sit with minimal assist-not met  2. Pt sit to supine with minimal assist-not met  3. Pt sit to stand with LRAD with minimal assist-not met  4. Pt to perform gait 10ft with LRAD with max assist.-not met  5. Pt to go up/down curb step with LRAD with max assist.-not met  6. Pt to transfer bed to/from bedside chair with moderate assist.-not met  7. Pt to perform B LE exs in sitting or supine x 10 reps to strengthen B LE to improve functional mobility.-not met                      Time Tracking:     PT Received On: 07/15/19  PT Start Time: 1030     PT Stop Time: 1056  PT Total Time (min): 26 min     Billable Minutes: Therapeutic Activity 15 and Neuromuscular Re-education 11    Treatment Type: Treatment  PT/PTA: PT           Apple Markham, PT  07/15/2019

## 2019-07-15 NOTE — SUBJECTIVE & OBJECTIVE
Past Medical History:   Diagnosis Date    CVA (cerebral infarction) 4/22/2016    Paroxysmal atrial fibrillation 4/22/2016    Stroke      Past Surgical History:   Procedure Laterality Date    watchman device       Family History   Problem Relation Age of Onset    Hyperlipidemia Mother     Hypertension Mother     Cancer Father     Early death Father      Social History     Tobacco Use    Smoking status: Never Smoker    Smokeless tobacco: Never Used   Substance Use Topics    Alcohol use: Yes     Comment: 2 times per month    Drug use: No     Review of patient's allergies indicates:  No Known Allergies    Medications: I have reviewed the current medication administration record.    No medications prior to admission.       Review of Systems   Unable to perform ROS: Other     Objective:     Vital Signs (Most Recent):  Temp: 98.2 °F (36.8 °C) (07/15/19 1101)  Pulse: (!) 56 (07/15/19 1401)  Resp: 18 (07/15/19 1401)  BP: 116/69 (07/15/19 1401)  SpO2: 98 % (07/15/19 1401)    Vital Signs Range (Last 24H):  Temp:  [98.2 °F (36.8 °C)-98.9 °F (37.2 °C)]   Pulse:  []   Resp:  [16-43]   BP: (103-136)/(59-77)   SpO2:  [96 %-100 %]   Arterial Line BP: (116-144)/(58-80)     Physical Exam   Constitutional: He appears well-developed and well-nourished.   HENT:   Head: Normocephalic and atraumatic.   Eyes: Pupils are equal, round, and reactive to light. EOM are normal.   Neck: Normal range of motion.   Cardiovascular: Normal rate.   Pulmonary/Chest: Effort normal.   Abdominal: Soft.   Neurological:   Right sided weakness, facial droop, aphasia   Skin: Skin is warm and dry.   Nursing note and vitals reviewed.      Neurological Exam:   LOC: alert  Attention Span: poor  Language: Expressive aphasia  Articulation: Dysarthria  Orientation: Untestable due to severe aphasia   Visual Fields: Full  EOM (CN III, IV, VI): Gaze preference  left  Pupils (CN II, III): PERRL  Facial Sensation (CN V): Normal  Facial Movement (CN  VII): Lower facial weakness on the Right  Gag Reflex: present  Reflexes: flexor plantar responses bilaterally  Motor: Arm left  Normal 5/5  Leg left  Normal 5/5  Arm right  Plegia 0/5  Leg right Paresis: 2/5  Cebellar: No evidence of appendicular or axial ataxia  Sensation: Intact to light touch, temperature and vibration  Tone: Flaccid  RLE       Laboratory:  CMP:   Recent Labs   Lab 07/15/19  0013  07/15/19  1145   CALCIUM 8.2*  --   --    ALBUMIN 3.2*  --   --    PROT 5.9*  --   --      139   < > 139   K 4.0  --   --    CO2 21*  --   --      --   --    BUN 8  --   --    CREATININE 1.0  --   --    ALKPHOS 50*  --   --    ALT 17  --   --    AST 29  --   --    BILITOT 0.4  --   --     < > = values in this interval not displayed.     CBC:   Recent Labs   Lab 07/15/19  0013   WBC 8.61   RBC 3.91*   HGB 12.6*   HCT 38.5*   *   MCV 99*   MCH 32.2*   MCHC 32.7     Lipid Panel:   Recent Labs   Lab 07/14/19  0456   CHOL 159   LDLCALC 91.6   HDL 58   TRIG 47     Coagulation:   Recent Labs   Lab 07/15/19  1005   INR 1.1   APTT 22.9     Hgb A1C:   Recent Labs   Lab 07/14/19  0300   HGBA1C 5.1     TSH:   Recent Labs   Lab 07/14/19  0300   TSH 0.527       Diagnostic Results:      Brain imaging:  CT head w/o contrast 7-13-19 results:  No acute intracranial findings.    CT head w/o contrast 7-14-19 results:  Chronic changes are noted there is no evidence for superimposed acute intracranial process.    Vessel Imaging:  CTA Head and neck multiphase 7-13-19 results:    Cardiac Evaluation:   EKG 7-13-19 results:

## 2019-07-15 NOTE — PLAN OF CARE
CONY contacted Olga with the [facility] ([phone]) to see if they are helping with the DC planning for this Pt. She reported they do not have him registered and the  would be like any other commercial insurance.    Elsi Donahue LMSW  Neurocritical Care   [facility]  [phone]

## 2019-07-15 NOTE — HOSPITAL COURSE
Mr. Justyn Rawls was admitted to Neuro ICU for higher level of care s/p tPA and IR thrombectomy of the L M1 with multiple clots removed. He underwent 2% hypertonic saline and mannitol treatments for aggressive management of cerebral edema. Patient then experienced thrombocytopenia, suspected related to Heparin therapy as once heparin was d/c'd, pt's plt count was with uptrend. Cardiology was consulted and recommended ABDIAS, though there was no evidence of thrombus on pt's Watchman device. Pt then was stable for step down to floor; experienced urinary retention and was subsequently found to have a UTI for which Cipro x 10 days and Flomax were started.   Stroke etiology suspected to be cardioembolism 2/2 AFib, not on anticoagulation at this time. Ambulatory referral was placed to Electrophysiology at discharge for consultation regarding possible ablation.     Patient discharged with recommendations for admission to [facility] inpatient re[facility].      Patient with improvement in stroke symptoms since admission. Inpatient acute stroke work up completed and patient stable for discharge. Please see all appropriate medication changes, imaging results, and necessary follow-up below.

## 2019-07-15 NOTE — SUBJECTIVE & OBJECTIVE
Interval History: NAEON. Neuro exam stable. CTH stable. On 2%@60 and Mannitol 25gq6h.    Medications:  Continuous Infusions:   niCARdipine Stopped (07/14/19 0330)    Sodium Chloride 2% 60 mL/hr at 07/15/19 0801     Scheduled Meds:   atorvastatin  40 mg Oral Daily    folic acid  1 mg Oral Daily    mannitol 20%  25 g Intravenous Q6H    thiamine  100 mg Oral Daily     PRN Meds:ondansetron, sodium chloride 0.9%     Review of Systems  Objective:     Weight: 89.4 kg (197 lb)  Body mass index is 23.98 kg/m².  Vital Signs (Most Recent):  Temp: 98.9 °F (37.2 °C) (07/15/19 0701)  Pulse: 60 (07/15/19 0801)  Resp: 19 (07/15/19 0801)  BP: 120/70 (07/15/19 0801)  SpO2: 98 % (07/15/19 0801) Vital Signs (24h Range):  Temp:  [97.5 °F (36.4 °C)-98.9 °F (37.2 °C)] 98.9 °F (37.2 °C)  Pulse:  [] 60  Resp:  [15-40] 19  SpO2:  [96 %-100 %] 98 %  BP: (103-136)/(58-79) 120/70  Arterial Line BP: (126-144)/(65-73) 129/65     Date 07/15/19 0700 - 07/16/19 0659   Shift 4734-5086 4375-1508 7095-2437 24 Hour Total   INTAKE   I.V.(mL/kg) 121(1.4)   121(1.4)   Shift Total(mL/kg) 121(1.4)   121(1.4)   OUTPUT   Shift Total(mL/kg)       Weight (kg) 89.4 89.4 89.4 89.4                        Neurosurgery Physical Exam   [id number]  PERRL  Expressive aphasia  R facial droop  FS in LUE/LLE  Plegic RUE  4/5 LLE  SILT    Significant Labs:  Recent Labs   Lab 07/13/19  2214 07/14/19  0456 07/15/19  0013 07/15/19  0530   GLU 90 102 96  --     138 139  139 141   K 3.8 4.3 4.0  --     104 106  --    CO2 21* 24 21*  --    BUN 11 11 8  --    CREATININE 1.13 1.1 1.0  --    CALCIUM 8.4 8.8 8.2*  --    MG  --  1.8 1.9  --      Recent Labs   Lab 07/13/19  2214 07/14/19  0300 07/15/19  0013   WBC 6.29 9.69 8.61   HGB 13.1* 13.4* 12.6*   HCT 37.9* 39.6* 38.5*    157 136*     Recent Labs   Lab 07/13/19 2214   APTT 23.9*         Significant Diagnostics:  I have reviewed and interpreted all pertinent imaging results/findings within the past 24  hours.   Ct Head Without Contrast    Result Date: 7/15/2019  Evolving infarct in the territory of left MCA territory with associated edema/mass effect and left-to-right subfalcine herniation.  No hemorrhagic conversion or new major vascular distribution infarct. Remote infarct in the left parietal lobe. Electronically signed by resident: Amber Fritz Date:    07/15/2019 Time:    03:04 Electronically signed by: Javi Main MD Date:    07/15/2019 Time:    03:39    Ct Head Without Contrast    Result Date: 7/14/2019  Changes consistent with acute infarction in the left MCA territory with associated diffuse cytotoxic edema and left to right subfalcine herniation.  Continued short-term follow-up is suggested. This report was flagged in Epic as abnormal. COMMUNICATION This critical result was discovered/received at 1900.  The critical information above was relayed directly by Pola Holloway MD by telephone to Dinora Ge PA-C on 07/14/2019 at 1912. Electronically signed by resident: Pola Holloway MD Date:    07/14/2019 Time:    18:25 Electronically signed by: Freddy Mccormick MD Date:    07/14/2019 Time:    19:27

## 2019-07-15 NOTE — PLAN OF CARE
07/15/19 1700   Post-Acute Status   Post-Acute Authorization Placement   Post-Acute Placement Status Referrals Sent  (rehab. 1. [facility], 2. [facility], 3. [facility])     CONY met with Pt wife and multiple Pt family members at bedside. Discussed therapy recs for rehab and provided list. Pt wife reported being okay with referrals being sent to 1. [facility] rehab, 2. [facility], 3. [facility]. She is not sure of others but those are the main three she would want. She will tour and call if preferences change. CONY sent referrals via  to 5 rehab facilities.    Elsi Donahue LMSW  Neurocritical Care   [facility]  [phone]

## 2019-07-15 NOTE — PT/OT/SLP EVAL
"Occupational Therapy   Evaluation    Name: Justyn Rawls  MRN: [id number]  Admitting Diagnosis:  Stroke due to embolism of left middle cerebral artery      Recommendations:     Discharge Recommendations: rehabilitation facility  Discharge Equipment Recommendations:  shower chair  Barriers to discharge:       Assessment:     Justyn Rawls is a 49 y.o. male with a medical diagnosis of Stroke due to embolism of left middle cerebral artery.  He presents with performance deficits affecting function: weakness, impaired endurance, impaired self care skills, impaired sensation, impaired functional mobilty, gait instability, impaired balance, impaired cognition, decreased upper extremity function, decreased lower extremity function, decreased safety awareness, pain, impaired coordination, impaired fine motor, decreased coordination, visual deficits.      Rehab Prognosis: Good; patient would benefit from acute skilled OT services to address these deficits and reach maximum level of function.       Plan:     Patient to be seen 4 x/week to address the above listed problems via self-care/home management, therapeutic exercises, neuromuscular re-education, sensory integration  · Plan of Care Expires:    · Plan of Care Reviewed with: patient    Subjective     Patient: "I don't know what I need." "I feel pain on my face and head" "its 1990"     Occupational Profile:  Patient resides in [place] in [facility] with no steps entrance with wife and three children. Pt has a tub shower and currently owns no DME. Pt is R handed. Prior to admit, pt reports he was independent in ADLs/IADLs. 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      Pain/Comfort:  · Pain Rating 1: 9/10  · Location - Side 1: Right  · Location 1: face  · Pain Addressed 1: Reposition, Distraction, Cessation of Activity  · Pain Rating Post-Intervention 1: 9/10    Patients cultural, spiritual, Jew conflicts given the current situation: " no    Objective:     Communicated with: RN prior to session.  Patient found supine with arterial line, bed alarm, blood pressure cuff, chowdhury catheter, telemetry, peripheral IV, SCD, pulse ox (continuous) upon OT entry to room. No family present during session.       General Precautions: Standard, fall, aphasia   Orthopedic Precautions:N/A   Braces: N/A     Occupational Performance:    Bed Mobility:    · Patient completed Rolling/Turning to Left with  moderate assistance  · Patient completed Scooting/Bridging with moderate assistance  · Patient completed Supine to Sit with moderate assistance  · Patient completed Sit to Supine with moderate assistance    Functional Mobility/Transfers:  · Patient completed Sit <> Stand Transfer with moderate assistance  with  no assistive device with increased weight shift to the right.      Activities of Daily Living:  · Grooming: minimum assistance in seated, with VC for orientation to task  · Lower Body Dressing: moderate assistance in seated, don/doff of R sock with support of RLE.    Cognitive/Visual Perceptual:  Cognitive/Psychosocial Skills:     -       Oriented to: Person and unable to consistantly name date/time, location and situation   -       Follows Commands/attention:Follows one-step commands  -       Communication: expressive aphasia and receptive aphasia  -       Memory: Impaired STM and Poor immediate recall  -       Safety awareness/insight to disability: impaired   -       Mood/Affect/Coping skills/emotional control: Cooperative  Visual/Perceptual:      -Intact  -L gaze preference      Physical Exam:  Postural examination/scapula alignment:    -       Rounded shoulders  -       Forward head  -       Posterior pelvic tilt  -       L weightshift  Skin integrity: Visible skin intact  Edema:  None noted  Sensation:    -       Impaired  light/touch of RUE   Motor Planning:  Poor motor planning  Upper Extremity Range of Motion:     -       Right Upper Extremity: flaccid  RUE  -       Left Upper Extremity: WFL  Upper Extremity Strength:    -       Right Upper Extremity: Deficits: flaccid, no movement noted  -       Left Upper Extremity: WFL   Strength:    -       Right Upper Extremity: Deficits: no  strength noted  -       Left Upper Extremity: WFL    AMPAC 6 Click ADL:  AMPAC Total Score: 15    Treatment & Education:  Pt oriented to role of OT in hospital setting. Education provided on importance of OOB with staff only at this time.  Pt education on positioning and techniques for bed mobility and awareness of Rside. Patient alert and oriented x person.  Able to follow 1/4 one step commands.  Able to sequence 6/7 days of the week and 11/12 months of the year.  Lateral flexion noted on right.Education:    Left gaze noted.  Able to identify 3/3 objects.  Unable to identify body parts, 0/3.  Patient left supine with all lines intact, call button in reach and bed alarm on    GOALS:   Multidisciplinary Problems     Occupational Therapy Goals        Problem: Occupational Therapy Goal    Goal Priority Disciplines Outcome Interventions   Occupational Therapy Goal     OT, PT/OT     Description:  Goals set 7/15 to be met by 7/29   Patient will increase functional independence with ADLs by performing:    UE Dressing with Minimal Assistance with hemiplegic technique.  LE Dressing with Minimal Assistance.  Grooming while standing with Contact Guard Assistance.   Toileting from bedside commode with Minimal Assistance for hygiene and clothing management.   Rolling to Left with Contact Guard Assistance.   Rolling to Right with Supervision.   Stand pivot transfers with Contact Guard Assistance.  Patient/Caregivers will be independent in assisting in bed mobility/positioning  Patient/Caregivers will be independent in HEP for weightbearing with RUE, PROM of RUE.                        History:     Past Medical History:   Diagnosis Date    CVA (cerebral infarction) 4/22/2016    Paroxysmal  atrial fibrillation 4/22/2016    Stroke        Past Surgical History:   Procedure Laterality Date    watchman device         Time Tracking:     OT Date of Treatment: 07/15/19  OT Start Time: 0732  OT Stop Time: 0758  OT Total Time (min): 26 min    Billable Minutes:Evaluation 26    Yesenia Hernandez, OT  7/15/2019

## 2019-07-15 NOTE — SUBJECTIVE & OBJECTIVE
Interval History:  Continued 2%NS and mannitol.  Patient planned for MRI wo contrast tomorrow morning with chemical DVT ppx following.      Review of Systems:   Review of Systems   Constitutional: Negative for chills and fever.   HENT: Negative for congestion and sore throat.    Eyes: Negative for blurred vision and double vision.   Respiratory: Negative for cough and shortness of breath.    Cardiovascular: Negative for chest pain and palpitations.   Gastrointestinal: Negative for abdominal pain and nausea.   Genitourinary: Negative for dysuria and urgency.   Musculoskeletal: Negative for back pain and joint pain.   Skin: Negative for rash.   Neurological: Positive for focal weakness and weakness.   Endo/Heme/Allergies: Negative for polydipsia. Does not bruise/bleed easily.       Vitals:   Temp: 99.5 °F (37.5 °C)  Pulse: 61  Rhythm: normal sinus rhythm  BP: 123/74  MAP (mmHg): 94  Resp: (!) 22  SpO2: 99 %  O2 Device (Oxygen Therapy): room air    Temp  Min: 98.2 °F (36.8 °C)  Max: 99.5 °F (37.5 °C)  Pulse  Min: 52  Max: 103  BP  Min: 105/61  Max: 136/68  MAP (mmHg)  Min: 77  Max: 98  Resp  Min: 16  Max: 43  SpO2  Min: 96 %  Max: 100 %    07/14 0701 - 07/15 0700  In: 600 [I.V.:600]  Out: 1950 [Urine:1950]   Unmeasured Output  Urine Occurrence: 1  Stool Occurrence: 0  Emesis Occurrence: 1     Examination:   Constitutional: Well-nourished and -developed. No apparent distress.   Eyes: Conjunctiva clear, anicteric. Lids no lesions.  Head/Ears/Nose/Mouth/Throat/Neck: Moist mucous membranes. External ears, nose atraumatic.   Cardiovascular: Regular rhythm. No murmurs. No leg edema.  Respiratory: Comfortable respirations. Clear to auscultation.  Gastrointestinal: No hernia. Soft, nondistended, nontender. + bowel sounds.    Neurologic:  -GCS E3V5M6  -Patient is drowsy. Oriented to person and place, mild confusion with command following. Speech dysarthric and aphasia. Follows commands.  -Cranial nerves II-XII grossly  intact  -Right sided facial droop  -RUE strength: 0/5  -RLE strength: 5/5 hip flexion and extension; 0/5 knee flexion and extension; 0/5 plantar flexion/extension  -LUE strength:5/5  -LLE strength: 5/5      Medications:   Continuous  niCARdipine Last Rate: Stopped (07/14/19 0330)   Sodium Chloride 2% Last Rate: 125 mL/hr at 07/15/19 1601   Scheduled  atorvastatin 40 mg Daily   folic acid 1 mg Daily   mannitol 20% 25 g Q6H   senna-docusate 8.6-50 mg 1 tablet BID   thiamine 100 mg Daily   PRN  acetaminophen 650 mg Q6H PRN   ondansetron 8 mg Q8H PRN   sodium chloride 0.9% 10 mL PRN      Today I independently reviewed pertinent medications, lines/drains/airways, imaging,     ISTAT: Recent Labs   Lab 07/15/19  1049   PH 7.395   PCO2 35.4   PO2 85   POCSATURATED 96   HCO3 21.7*   BE -3   POCTCO2 23   SAMPLE ARTERIAL      Chem: Recent Labs   Lab 07/15/19  0013  07/15/19  1145     139   < > 139   K 4.0  --   --      --   --    CO2 21*  --   --    GLU 96  --   --    BUN 8  --   --    CREATININE 1.0  --   --    ESTGFRAFRICA >60.0  --   --    EGFRNONAA >60.0  --   --    CALCIUM 8.2*  --   --    MG 1.9  --   --    PHOS 3.0  --   --    ANIONGAP 12  --   --    PROT 5.9*  --   --    ALBUMIN 3.2*  --   --    BILITOT 0.4  --   --    ALKPHOS 50*  --   --    AST 29  --   --    ALT 17  --   --     < > = values in this interval not displayed.     Heme: Recent Labs   Lab 07/15/19  0013 07/15/19  1005   WBC 8.61  --    HGB 12.6*  --    HCT 38.5*  --    *  --    INR  --  1.1     Endo:   Recent Labs   Lab 07/15/19  0014 07/15/19  0529 07/15/19  1012   POCTGLUCOSE 96 103 100          Today I personally reviewed pertinent medications, imaging, notably:    Diet  Diet Dysphagia Mechanical Soft (IDDSI Level 5) Thin  Diet Dysphagia Mechanical Soft (IDDSI Level 5) Thin
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
## 2019-07-15 NOTE — PLAN OF CARE
Problem: Adult Inpatient Plan of Care  Goal: Plan of Care Review  Outcome: Ongoing (interventions implemented as appropriate)  POC reviewed with pt at 000. Pt verbalized understanding. Questions and concerns addressed with family. Mannitol given q6h. 2% started and continued at 60 ml/hr. Trending Na q6h. Arterial line placed. Pt taken to CT at 0245; tolerated well. Pt progressing toward goals. Will continue to monitor. See flowsheets for full assessment and VS info

## 2019-07-15 NOTE — ASSESSMENT & PLAN NOTE
50 y/o male with L MCA stroke due to M1 occlusion received IV TPA and thrombectomy to TICI 2C, probable due to afib    Antithrombotics: Need anticoagulation after TPA window    Statins: Lipitor 40 mg daily    Aggressive risk factor modification: A-Fib, alcohol     Rehab efforts: The patient has been evaluated by a stroke team provider and the therapy needs have been fully considered based off the presenting complaints and exam findings. The following therapy evaluations are needed: PT evaluate and treat, OT evaluate and treat, SLP evaluate and treat, PM&R evaluate for appropriate placement    Diagnostics ordered/pending: HgbA1C to assess blood glucose levels, Lipid Profile to assess cholesterol levels, MRI head without contrast to assess brain parenchyma, TTE to assess cardiac function/status     VTE prophylaxis: SCD's raquel begin Heparin 5000 units sc Q8H on 7-15-10 at 0600    BP parameters: Infarct: Post sucessful thrombectomy, SBP <140
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
## 2019-07-15 NOTE — SIGNIFICANT EVENT
7/14  Pt nauseated and vomitting this afternoon.  Sent for stat CTH, which showed hypoattenuation of L basal ganglia,and portions of the posterior left frontal and left parietal lobes:  diffuse cytotoxic edema, with mass effect and effacement of sulci and  anterior horn of left lateral ventricle. No midline shift, but mild  L subfalcine herniation.  Neuro exam - Lethargic, oriented to time, place and person, PERRL 3+, EOMI, RUE flaccid all other extremities 5/5 strength and moves spontaneously. Follows commands. Has difficulty with 2 step commands. Dr Lock notified. Bolused with 150cc2%HTS over 30minutes followed by 2% gtt @ 60cc/h. Mannitol 25g q6h started. Goal Na 145-150. Na checks q6h. NSGY consulted for hemicrani watch. Follow ABGs q6h. Arterial Line to be placed.

## 2019-07-15 NOTE — PLAN OF CARE
Problem: Occupational Therapy Goal  Goal: Occupational Therapy Goal  OT evaluation completed.  JONNY Sanches  07/15/2019

## 2019-07-15 NOTE — PLAN OF CARE
Problem: SLP Goal  Goal: SLP Goal  Speech Language Pathology Goals  Goals expected to be met by 7/21    1. Pt will tolerate diet of thin liquids and dental soft solids without overt clinical signs of aspiration   2. Pt will participate in ongoing assessment of speech language and cognitive linguistic skills to help rule out deficits and determine therapeutic plan of care   Updated additional goals:   1. Pt will participate in simple visual scanning tasks with 80% accuracy and mod cues.  2. Pt will complete OMEs x10 with SLP and independently   3. Pt will complete orientation tasks with 80% accuracy and mod cues.  4. Pt will complete simple naming tasks with 50% accuracy and mod cues.   5. Pt will follow commands with 50% accuracy and mod cues.       Outcome: Ongoing (interventions implemented as appropriate)  Pt seen for SLC eval. Goals updated. SLP to continue to follow and continue POC.   Opal Thompson, ALEX  7/15/19

## 2019-07-15 NOTE — ASSESSMENT & PLAN NOTE
49M with L MCA occlusion s/p tPA and TICI 2c thrombectomy, neurologically stable w/CTH demonstrating L MCA infarction w/associated edema.    Continue admit NCC  Medical management per primary team  SBP<140  q1h neuro checks  Consider interval CTH     Please page NSGY with change in exam, will continue to monitor closely.

## 2019-07-15 NOTE — PROCEDURES
"Justyn Rawls is a 49 y.o. male patient.    Temp: 98.4 °F (36.9 °C) (19 2305)  Pulse: 68 (07/15/19 0101)  Resp: 17 (07/15/19 0101)  BP: 105/61 (07/15/19 0101)  SpO2: 97 % (07/15/19 0101)  Weight: 89.4 kg (197 lb) (19)  Height: 6' 4" (193 cm) (19)       Arterial Line  Date/Time: 7/15/2019 2:25 AM  Location procedure was performed: [facility] NEURO CRITICAL CARE  Performed by: Kathryn Jonas NP  Authorized by: Kathryn Jonas NP   Assisting provider: Pola Fine NP  Consent Done: Yes  Consent: Written consent obtained.  Risks and benefits: risks, benefits and alternatives were discussed  Consent given by: spouse  Patient identity confirmed: , name and MRN  Time out: Immediately prior to procedure a "time out" was called to verify the correct patient, procedure, equipment, support staff and site/side marked as required.  Preparation: Patient was prepped and draped in the usual sterile fashion.  Indications: hemodynamic monitoring  Location: right radial  Anesthesia: local infiltration    Anesthesia:  Local Anesthetic: lidocaine 1% without epinephrine  Anesthetic total: 1 mL  Patient sedated: no  Kade's test normal: yes  Number of attempts: 1  Complications: No  Specimens: No  Implants: No  Post-procedure: dressing applied  Post-procedure CMS: normal  Patient tolerance: Patient tolerated the procedure well with no immediate complications          Kathryn Jonas  7/15/2019    "

## 2019-07-15 NOTE — ASSESSMENT & PLAN NOTE
49M with L MCA occlusion s/p tPA and TICI 2c thrombectomy, neurologically stable w/CTH demonstrating L MCA infarction w/associated edema.    PSD 2, post thrombectomy day 2.    Continue admit NCC  Medical management per primary team  SBP<140  q1h neuro checks  Interval CTH reviewed.     Please page NSGY with change in exam, will continue to monitor closely.  No neurosurgical intervention at this time.

## 2019-07-15 NOTE — PLAN OF CARE
Problem: Physical Therapy Goal  Goal: Physical Therapy Goal  PT goals until 7/28/19    1. Pt supine to sit with minimal assist-not met  2. Pt sit to supine with minimal assist-not met  3. Pt sit to stand with LRAD with minimal assist-not met  4. Pt to perform gait 10ft with LRAD with max assist.-not met  5. Pt to go up/down curb step with LRAD with max assist.-not met  6. Pt to transfer bed to/from bedside chair with moderate assist.-not met  7. Pt to perform B LE exs in sitting or supine x 10 reps to strengthen B LE to improve functional mobility.-not met     Outcome: Ongoing (interventions implemented as appropriate)  No goals met on today. Goals remain appropriate.    Apple Markham, PT, DPT  7/15/2019

## 2019-07-15 NOTE — HOSPITAL COURSE
7/14 admit to NCC L MCA embolic stroke, s/p tpa, s/p thrombectomy L M1 multiple clots removed. ETOH use, monitor for withdrawals.  7/15 scheduled MRI wo contrast for tomorrow morning with planning for chemical DVT ppx. Continued 2% hypertonic saline and mannitol.   7/16 MRI wo contrast currently postponed, awaiting clarification that atrial device is safe to scan.  No acute events today. Continued 2% saline and mannitol.   7/17 MRI wo contrast preformed today, awaiting results. Plans to start anticoagulation following MRI per stroke team.   7/18 Cardiology consulted today for recs on whether to start AC or preform a ABDIAS to check potential thrombi on watchman.   7/19 Platelet count decreased below 100 while Heparin. D/c heparin and hold on any AC.  Gave lasix IV 20 to help with fluid overload. D/c 2%HTS  7/20 Platelet count still below 100.  HIT antibody panel pending.  Hold on ABDIAS until thrombocytopenia resolved.   7/21: Developing HA. Repeated CTH with no significant interval changes   7/22: ABDIAS procedure tomorrow 7/23, NPO at midnight   7/23: ABDIAS complete, no thrombus seen on exam, 81 mg started, transfer to stroke pending   7/24: Stable for transfer today; NCC recommend DOAC (Xarelto); trend LFTs

## 2019-07-15 NOTE — PROGRESS NOTES
[facility]  Vascular Neurology  Comprehensive Stroke Center  Progress Note    Assessment/Plan:     * Stroke due to embolism of left middle cerebral artery  50 y/o male with L MCA stroke due to M1 occlusion received IV TPA and thrombectomy to TICI 2C, probable due to afib    Antithrombotics: Need anticoagulation after TPA window    Statins: Lipitor 40 mg daily    Aggressive risk factor modification: A-Fib, alcohol     Rehab efforts: The patient has been evaluated by a stroke team provider and the therapy needs have been fully considered based off the presenting complaints and exam findings. The following therapy evaluations are needed: PT evaluate and treat, OT evaluate and treat, SLP evaluate and treat, PM&R evaluate for appropriate placement    Diagnostics ordered/pending: HgbA1C to assess blood glucose levels, Lipid Profile to assess cholesterol levels, MRI head without contrast to assess brain parenchyma, TTE to assess cardiac function/status     VTE prophylaxis: SCD's raquel begin Heparin 5000 units sc Q8H on 7-15-10 at 0600    BP parameters: Infarct: Post sucessful thrombectomy, SBP <140        Aphasia  Due to stroke  Aggressive therapy    Hemiparesis, right  Due to stroke  Aggressive therapy    S/P admn tPA in diff fac w/n last 24 hr bef adm to crnt fac  Close monitoring in NCC 24 hours post administration         PAF (paroxysmal atrial fibrillation)  Stroke risk factor  Rate control  CHADVASC score: 2  HAS BLED score: 2  Needs anticogulation         Patient was admitted on 07/14 with L MCA ischemic stroke resulted in right side weakness, aphasia, facial drop and LT gaze prefrance. NIH on admission 13. He is S/P tpa and thrombectomy     STROKE DOCUMENTATION   Acute Stroke Times   Last Known Normal Date: 07/13/19  Last Known Normal Time: 2130  Symptom Onset Date: 07/13/19  Symptom Onset Time: 2130  Stroke Team Called Date: 07/13/19  Stroke Team Called Time: 2245  Stroke Team Arrival Date:  07/14/19  Stroke Team Arrival Time: 0107  CT Interpretation Time: 2251  Decision to Treat Time for Alteplase: 2307(bolus given)  Decision to Treat Time for IR: 0107    NIH Scale:  1a. Level of Consciousness: 0-->Alert, keenly responsive  1b. LOC Questions: 1-->Answers one question correctly  1c. LOC Commands: 0-->Performs both tasks correctly  2. Best Gaze: 0-->Normal  3. Visual: 1-->Partial hemianopia  4. Facial Palsy: 2-->Partial paralysis (total or near-total paralysis of lower face)  5a. Motor Arm, Left: 0-->No drift, limb holds 90 (or 45) degrees for full 10 secs  5b. Motor Arm, Right: 3-->No effort against gravity, limb falls  6a. Motor Leg, Left: 0-->No drift, leg holds 30 degree position for full 5 secs  6b. Motor Leg, Right: 2-->Some effort against gravity, leg falls to bed by 5 secs, but has some effort against gravity  7. Limb Ataxia: 0-->Absent  8. Sensory: 0-->Normal, no sensory loss  9. Best Language: 1-->Mild-to-moderate aphasia, some obvious loss of fluency or facility of comprehension, without significant limitation on ideas expressed or form of expression. Reduction of speech and/or comprehension, however, makes conversation. . . (see row details)  10. Dysarthria: 1-->Mild-to-moderate dysarthria, patient slurs at least some words and, at worst, can be understood with some difficulty  11. Extinction and Inattention (formerly Neglect): 0-->No abnormality  Total (NIH Stroke Scale): 11       Modified Kerry Score: 0  Brandin Coma Scale:15   ABCD2 Score:    [id number] Score:2  HAS -BLED Score:2  ICH Score:   Hunt & Ta Classification:              Past Medical History:   Diagnosis Date    CVA (cerebral infarction) 4/22/2016    Paroxysmal atrial fibrillation 4/22/2016    Stroke      Past Surgical History:   Procedure Laterality Date    watchman device       Family History   Problem Relation Age of Onset    Hyperlipidemia Mother     Hypertension Mother     Cancer Father     Early death Father       Social History     Tobacco Use    Smoking status: Never Smoker    Smokeless tobacco: Never Used   Substance Use Topics    Alcohol use: Yes     Comment: 2 times per month    Drug use: No     Review of patient's allergies indicates:  No Known Allergies    Medications: I have reviewed the current medication administration record.    No medications prior to admission.       Review of Systems   Unable to perform ROS: Other     Objective:     Vital Signs (Most Recent):  Temp: 98.2 °F (36.8 °C) (07/15/19 1101)  Pulse: (!) 56 (07/15/19 1401)  Resp: 18 (07/15/19 1401)  BP: 116/69 (07/15/19 1401)  SpO2: 98 % (07/15/19 1401)    Vital Signs Range (Last 24H):  Temp:  [98.2 °F (36.8 °C)-98.9 °F (37.2 °C)]   Pulse:  []   Resp:  [16-43]   BP: (103-136)/(59-77)   SpO2:  [96 %-100 %]   Arterial Line BP: (116-144)/(58-80)     Physical Exam   Constitutional: He appears well-developed and well-nourished.   HENT:   Head: Normocephalic and atraumatic.   Eyes: Pupils are equal, round, and reactive to light. EOM are normal.   Neck: Normal range of motion.   Cardiovascular: Normal rate.   Pulmonary/Chest: Effort normal.   Abdominal: Soft.   Neurological:   Right sided weakness, facial droop, aphasia   Skin: Skin is warm and dry.   Nursing note and vitals reviewed.      Neurological Exam:   LOC: alert  Attention Span: poor  Language: Expressive aphasia  Articulation: Dysarthria  Orientation: Untestable due to severe aphasia   Visual Fields: Full  EOM (CN III, IV, VI): Gaze preference  left  Pupils (CN II, III): PERRL  Facial Sensation (CN V): Normal  Facial Movement (CN VII): Lower facial weakness on the Right  Gag Reflex: present  Reflexes: flexor plantar responses bilaterally  Motor: Arm left  Normal 5/5  Leg left  Normal 5/5  Arm right  Plegia 0/5  Leg right Paresis: 2/5  Cebellar: No evidence of appendicular or axial ataxia  Sensation: Intact to light touch, temperature and vibration  Tone: Flaccid  RLE        Laboratory:  CMP:   Recent Labs   Lab 07/15/19  0013  07/15/19  1145   CALCIUM 8.2*  --   --    ALBUMIN 3.2*  --   --    PROT 5.9*  --   --      139   < > 139   K 4.0  --   --    CO2 21*  --   --      --   --    BUN 8  --   --    CREATININE 1.0  --   --    ALKPHOS 50*  --   --    ALT 17  --   --    AST 29  --   --    BILITOT 0.4  --   --     < > = values in this interval not displayed.     CBC:   Recent Labs   Lab 07/15/19  0013   WBC 8.61   RBC 3.91*   HGB 12.6*   HCT 38.5*   *   MCV 99*   MCH 32.2*   MCHC 32.7     Lipid Panel:   Recent Labs   Lab 07/14/19  0456   CHOL 159   LDLCALC 91.6   HDL 58   TRIG 47     Coagulation:   Recent Labs   Lab 07/15/19  1005   INR 1.1   APTT 22.9     Hgb A1C:   Recent Labs   Lab 07/14/19  0300   HGBA1C 5.1     TSH:   Recent Labs   Lab 07/14/19  0300   TSH 0.527       Diagnostic Results:      Brain imaging:  CT head w/o contrast 7-13-19 results:  No acute intracranial findings.    CT head w/o contrast 7-14-19 results:  Chronic changes are noted there is no evidence for superimposed acute intracranial process.    Vessel Imaging:  CTA Head and neck multiphase 7-13-19 results:    Cardiac Evaluation:   EKG 7-13-19 results:        Zainab Caceres MD  [facility]  Department of Vascular Neurology   [facility]

## 2019-07-15 NOTE — PLAN OF CARE
07/15/19 1425   Discharge Assessment   Assessment Type Discharge Planning Assessment   Confirmed/corrected address and phone number on facesheet? Yes   Assessment information obtained from? Caregiver   Expected Length of Stay (days) 3   Communicated expected length of stay with patient/caregiver yes   Prior to hospitilization cognitive status: Alert/Oriented   Prior to hospitalization functional status: Independent   Current cognitive status: Unable to Assess   Current Functional Status: Needs Assistance   Facility Arrived From: [facility] With spouse   Able to Return to Prior Arrangements yes   Is patient able to care for self after discharge? Unable to determine at this time (comments)   Who are your caregiver(s) and their phone number(s)? Brandi Rawls (spouse) [phone]   Patient's perception of discharge disposition other (comments)  (john)   Readmission Within the Last 30 Days no previous admission in last 30 days   Patient currently being followed by outpatient case management? No   Patient currently receives any other outside agency services? No   Equipment Currently Used at Home none   Do you have any problems affording any of your prescribed medications? No   Is the patient taking medications as prescribed?   (john)   Does the patient have transportation home? Yes   Transportation Anticipated family or friend will provide   Does the patient receive services at the [facility]madin Clinic? No   Discharge Plan A Rehab   Discharge Plan B Home with family;Home Health   DME Needed Upon Discharge  other (see comments)  (tbd)   Patient/Family in Agreement with Plan yes         Discharge/ My Health Packet Folder Given to patient/family:      yes      PCP:  none      Pharmacy:    [id number] - [place] - [address] [facility]  [address] [address] [address]  Phone: [phone] Fax: [phone]          Emergency Contacts:  Extended Emergency Contact Information  Primary Emergency Contact:  Brandi Rawls  Address: [address] [place], [place] [address] [place] [address] of [place]  Home Phone: [phone]  Work Phone: [phone]  Relation: Spouse      Insurance:  Payor:  / Plan:  [employer]ERV[employer] / Product Type: Government /     Dorie Aggarwal RN, CCRN-K, [facility]  Neuro-Critical Care   X [phone]

## 2019-07-15 NOTE — PLAN OF CARE
Problem: Adult Inpatient Plan of Care  Goal: Plan of Care Review  Outcome: Ongoing (interventions implemented as appropriate)  POC reviewed with pt and family at 1400. Pt verbalized understanding. Questions and concerns addressed with pt and wife and in agreement with POC. Sys <160 maintained with no PRN meds needed. Zofran given x1 for nausea. Tylenol given x1 for temp 99.6. 2% continued. Q6h Na checks with goal 145-155. Scheduled mannitol given Q6h. No acute events today. MRI scheduled for 0300 tomorrow. Pt progressing toward goals. Will continue to monitor. See flowsheets for full assessment and VS info.

## 2019-07-15 NOTE — PT/OT/SLP EVAL
"Speech Language Pathology Evaluation  Cognitive Communication    Patient Name:  Justyn Rawls   MRN:  [id number]  Admitting Diagnosis: Stroke due to embolism of left middle cerebral artery    Recommendations:     Recommendations:                General Recommendations:  Dysphagia therapy, Speech/language therapy and Cognitive-linguistic therapy  Diet recommendations:  Dental Soft, Thin   Aspiration Precautions: Check for pocketing/oral residue, Meds whole 1 at a time, No straws and Standard aspiration precautions   General Precautions: Standard, aphasia, fall, vision impaired  Communication strategies:  none    History:     Past Medical History:   Diagnosis Date    CVA (cerebral infarction) 4/22/2016    Paroxysmal atrial fibrillation 4/22/2016    Stroke        Past Surgical History:   Procedure Laterality Date    watchman device         Social History: Patient lives with spouse and is a .    Prior Intubation HX: none this admission    Modified Barium Swallow: none this admission    Chest X-Rays: n/a    Prior diet: regular solids and liquids, however this admission placed on dental soft/thin liquids 2/2 right sided weakness and oral pocketing       Subjective   Pt not hungry this date, PO trials deferred   "he is very eloquent with his words so this is difficult." re: pts wife explaining pt is a     Pain/Comfort:  · Pain Rating 1: 0/10  · Pain Rating Post-Intervention 1: 0/10    Objective:   Cognitive Status:    Arousal/Alertness: pt required wet towel stimulation to rouse; decreased alertness t/o session  Attention Sustained attention deficit pt required max cues to remain alert t/o session   Orientation Person and Time  Memory Immediate Recall recalled 4,5 digit sets, recalled 4 target word set with phonemic paraphasias evident  and Delayed 0/3 items with delay   Problem Solving Categories divergent categories: independently answered       Receptive Language:   Comprehension:      Questions Simple " yes/no answers appropriately independently  Complex yes/no required cues, unable to answer appropriately  Commands  One step unable to follow basicone step commands: requred verbal/tactile cues: suspect motor planning difficulties, will discuss with PT/OT  Object identifications required binary choice to identify objects; word finding difficulties evident with semantic paraphasias    Pragmatics:    Pt kept eyes closed majority of times, difficulty maintaing tasks and topics     Expressive Language:  Verbal:    Automatic Speech  Days of the week required min cues to initiate  and Months of the year required min cues to initiate, perseveration noted   Initiation pt required cues to initiate t/o tasks  Repetition Words repeats accurately indp. and Phrases repeats accurately indp.  Naming Confrontation required binary choice to inentify objects; word finding difficulties evident with semantic paraphasias, Divergent responsive independently answered accurately and Single word responsive naming unable to appropriately answer, Sentence completion: completed 5/6 sentence completion tasks independently   Sentence formulation paraphasias, circumlocation, aphasia, and word finding difficulties present      Motor Speech:  Dysarthria mild dysarthria present    Voice:   WFL clear quality     Visual-Spatial:  moderate right negelct evident; pt able to track SLP and object with visual and verbal cues     Reading:   not assessed      Written Expression:   not not assessed     Treatment: Pt seen for bedside swallow evaluation and SLC evaluation. Wife and sister-in law present. Pt deferred trials of breakfast 2/2 lack of appetite and decreased attention. Right neglect evident. SLP education included SLPs role, swallowing precs, the therapeutic journey and process, and POC. Pts wife and pt verbalized understanding and agreement. SLP to continue to follow.     Assessment:   Justyn Rawls is a 49 y.o. male with an SLP diagnosis of  Aphasia, Dysphagia, Dysarthria, Cognitive-Linguistic Impairment and Visio-Spatial Impairment.      Goals:   Multidisciplinary Problems     SLP Goals        Problem: SLP Goal    Goal Priority Disciplines Outcome   SLP Goal     SLP Ongoing (interventions implemented as appropriate)   Description:  Speech Language Pathology Goals  Goals expected to be met by 7/21    1. Pt will tolerate diet of thin liquids and dental soft solids without overt clinical signs of aspiration   2. Pt will participate in ongoing assessment of speech language and cognitive linguistic skills to help rule out deficits and determine therapeutic plan of care   Updated additional goals:   1. Pt will participate in simple visual scanning tasks with 80% accuracy and mod cues.  2. Pt will complete OMEs x10 with SLP and independently   3. Pt will complete orientation tasks with 80% accuracy and mod cues.  4. Pt will complete simple naming tasks with 50% accuracy and mod cues.   5. Pt will follow commands with 50% accuracy and mod cues.                         Plan:   · Patient to be seen:  4 x/week   · Plan of Care expires:     · Plan of Care reviewed with:  patient, spouse, family   · SLP Follow-Up:  Yes       Discharge recommendations:  Discharge Facility/Level of Care Needs: rehabilitation facility   Barriers to Discharge:  Level of Skilled Assistance Needed   and Safety Awareness      Time Tracking:   SLP Treatment Date:   07/15/19  Speech Start Time:  0900  Speech Stop Time:  0922     Speech Total Time (min):  22 min    Billable Minutes: Diego Cardoso Care/Home Management Training 11    ALEX Conte  07/15/2019
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
## 2019-07-15 NOTE — SUBJECTIVE & OBJECTIVE
No medications prior to admission.       Review of patient's allergies indicates:  No Known Allergies    Past Medical History:   Diagnosis Date    CVA (cerebral infarction) 4/22/2016    Paroxysmal atrial fibrillation 4/22/2016    Stroke      Past Surgical History:   Procedure Laterality Date    watchman device       Family History     Problem Relation (Age of Onset)    Cancer Father    Early death Father    Hyperlipidemia Mother    Hypertension Mother        Tobacco Use    Smoking status: Never Smoker    Smokeless tobacco: Never Used   Substance and Sexual Activity    Alcohol use: Yes     Comment: 2 times per month    Drug use: No    Sexual activity: Yes     Review of Systems   Unable to perform ROS: Mental status change     Objective:     Weight: 89.4 kg (197 lb)  Body mass index is 23.98 kg/m².  Vital Signs (Most Recent):  Temp: 98.3 °F (36.8 °C) (07/14/19 1905)  Pulse: 97 (07/14/19 2001)  Resp: (!) 33 (07/14/19 2001)  BP: 121/74 (07/14/19 2001)  SpO2: 100 % (07/14/19 2001) Vital Signs (24h Range):  Temp:  [97.5 °F (36.4 °C)-99.2 °F (37.3 °C)] 98.3 °F (36.8 °C)  Pulse:  [54-98] 97  Resp:  [9-37] 33  SpO2:  [96 %-100 %] 100 %  BP: (103-135)/(56-89) 121/74     Date 07/14/19 0700 - 07/15/19 0659   Shift 8486-8212 2776-3786 0190-4297 24 Hour Total   INTAKE   Shift Total(mL/kg)       OUTPUT   Urine(mL/kg/hr) 1000(1.4)   1000   Shift Total(mL/kg) 1000(11.2)   1000(11.2)   Weight (kg) 89.4 89.4 89.4 89.4                        Neurosurgery Physical Exam  Asleep, arouses to voice  E3V4M6  Oriented to name; place/year w/options; able to recognize family in room  PERRL  Slight L gaze preference  R facial droop  FS in LUE  Plegic RUE  AG in BLE  FS in LLE  4/5 R HF/KE/KF, 2/5 DF/PF in R foot; cannot lift off bed distal from knee (improved from prethrombectomy)  SILT    Significant Labs:  Recent Labs   Lab 07/13/19  2214 07/14/19  0456   GLU 90 102    138   K 3.8 4.3    104   CO2 21* 24   BUN 11 11    CREATININE 1.13 1.1   CALCIUM 8.4 8.8   MG  --  1.8     Recent Labs   Lab 07/13/19  2214 07/14/19  0300   WBC 6.29 9.69   HGB 13.1* 13.4*   HCT 37.9* 39.6*    157     Recent Labs   Lab 07/13/19 2214   APTT 23.9*     Alcohol level 69 on arrival, 15 this AM.     Significant Diagnostics:  I have reviewed and interpreted all pertinent imaging results/findings within the past 24 hours.   Ct Head Without Contrast    Result Date: 7/14/2019  Changes consistent with acute infarction in the left MCA territory with associated diffuse cytotoxic edema and left to right subfalcine herniation.  Continued short-term follow-up is suggested. This report was flagged in Epic as abnormal. COMMUNICATION This critical result was discovered/received at 1900.  The critical information above was relayed directly by Pola Holloway MD by telephone to Dinora Ge PA-C on 07/14/2019 at 1912. Electronically signed by resident: Pola Holloway MD Date:    07/14/2019 Time:    18:25 Electronically signed by: Freddy Mccormick MD Date:    07/14/2019 Time:    19:27    Ct Head Without Contrast    Result Date: 7/14/2019  Chronic changes are noted there is no evidence for superimposed acute intracranial process. Electronically signed by: Oliver Fuentes Date:    07/14/2019 Time:    01:30    Ct Head Without Contrast    Result Date: 7/13/2019  No acute intracranial findings. Discussed with the ER physician at 22:19 hours on 07/13/2019 Electronically signed by: Toni Jose MD Date:    07/13/2019 Time:    22:20    Cta Stroke Multi-phase    Result Date: 7/14/2019  Complete occlusion of the left M1 segment origin with some reconstitution of distal cortical branches through collateral flow. Agree with stuart. Electronically signed by: Brendan Mathis MD Date:    07/14/2019 Time:    07:23    Ir Angiogram Cerebral Intracranial Ea Add Vessel    Result Date: 7/14/2019  Cerebral angiography demonstrated left MCA origin occlusion.  This was treated with  mechanical embolectomy using a aspiration technique, as above. There was TICI 2C reperfusion. Electronically signed by: Tyler Mccarty MD Date:    07/14/2019 Time:    10:36

## 2019-07-15 NOTE — ASSESSMENT & PLAN NOTE
- s/p tPA over 24 hours  - CTA showed prox L MCA occlusion s/p thrombectomy w/ TICI2c flows post intervention  - started 2% hypertonic saline and mannitol for edema  - MRI scheduled for tomorrow  - [facility] neurology following, appreciate recs  - HOB flat post procedure  - SBP <160  - q1 neurochecks  - hold anticoagulation  - echo preformed on 7/14: EF 60% with normal LV systolic/ diastolic function and normal RV systolic function with mild tricuspid regurgitation.  - Per speech eval, placed on dysphagia diet. Appreciate further recs.  - PT/OT/ST

## 2019-07-15 NOTE — NURSING
Notified MEHRDAD Quinn of pt's Na of 139. Ordered to increase 2% to 125cc/hr. Will increase and continue to monitor.

## 2019-07-16 ENCOUNTER — DOCUMENTATION ONLY (OUTPATIENT)
Dept: CARDIOLOGY | Facility: HOSPITAL | Age: 49
End: 2019-07-16

## 2019-07-16 LAB
ALBUMIN SERPL BCP-MCNC: 2.7 G/DL (ref 3.5–5.2)
ALP SERPL-CCNC: 39 U/L (ref 55–135)
ALT SERPL W/O P-5'-P-CCNC: 14 U/L (ref 10–44)
ANION GAP SERPL CALC-SCNC: 4 MMOL/L (ref 8–16)
AST SERPL-CCNC: 22 U/L (ref 10–40)
BASOPHILS # BLD AUTO: 0.05 K/UL (ref 0–0.2)
BASOPHILS NFR BLD: 0.7 % (ref 0–1.9)
BILIRUB SERPL-MCNC: 0.3 MG/DL (ref 0.1–1)
BUN SERPL-MCNC: 6 MG/DL (ref 6–20)
CALCIUM SERPL-MCNC: 7.6 MG/DL (ref 8.7–10.5)
CHLORIDE SERPL-SCNC: 116 MMOL/L (ref 95–110)
CO2 SERPL-SCNC: 25 MMOL/L (ref 23–29)
CREAT SERPL-MCNC: 0.8 MG/DL (ref 0.5–1.4)
DIFFERENTIAL METHOD: ABNORMAL
EOSINOPHIL # BLD AUTO: 0 K/UL (ref 0–0.5)
EOSINOPHIL NFR BLD: 0.3 % (ref 0–8)
ERYTHROCYTE [DISTWIDTH] IN BLOOD BY AUTOMATED COUNT: 13.6 % (ref 11.5–14.5)
EST. GFR  (AFRICAN AMERICAN): >60 ML/MIN/1.73 M^2
EST. GFR  (NON AFRICAN AMERICAN): >60 ML/MIN/1.73 M^2
GLUCOSE SERPL-MCNC: 119 MG/DL (ref 70–110)
HCT VFR BLD AUTO: 34.9 % (ref 40–54)
HGB BLD-MCNC: 11.2 G/DL (ref 14–18)
IMM GRANULOCYTES # BLD AUTO: 0.07 K/UL (ref 0–0.04)
IMM GRANULOCYTES NFR BLD AUTO: 1 % (ref 0–0.5)
LYMPHOCYTES # BLD AUTO: 1.2 K/UL (ref 1–4.8)
LYMPHOCYTES NFR BLD: 17.9 % (ref 18–48)
MAGNESIUM SERPL-MCNC: 1.8 MG/DL (ref 1.6–2.6)
MCH RBC QN AUTO: 31.9 PG (ref 27–31)
MCHC RBC AUTO-ENTMCNC: 32.1 G/DL (ref 32–36)
MCV RBC AUTO: 99 FL (ref 82–98)
MONOCYTES # BLD AUTO: 0.8 K/UL (ref 0.3–1)
MONOCYTES NFR BLD: 11.3 % (ref 4–15)
NEUTROPHILS # BLD AUTO: 4.7 K/UL (ref 1.8–7.7)
NEUTROPHILS NFR BLD: 68.8 % (ref 38–73)
NRBC BLD-RTO: 0 /100 WBC
PHOSPHATE SERPL-MCNC: 1.2 MG/DL (ref 2.7–4.5)
PLATELET # BLD AUTO: 122 K/UL (ref 150–350)
PMV BLD AUTO: 10.1 FL (ref 9.2–12.9)
POCT GLUCOSE: 122 MG/DL (ref 70–110)
POCT GLUCOSE: 128 MG/DL (ref 70–110)
POCT GLUCOSE: 134 MG/DL (ref 70–110)
POTASSIUM SERPL-SCNC: 4.1 MMOL/L (ref 3.5–5.1)
PROT SERPL-MCNC: 5.1 G/DL (ref 6–8.4)
RBC # BLD AUTO: 3.51 M/UL (ref 4.6–6.2)
SODIUM SERPL-SCNC: 142 MMOL/L (ref 136–145)
SODIUM SERPL-SCNC: 143 MMOL/L (ref 136–145)
SODIUM SERPL-SCNC: 144 MMOL/L (ref 136–145)
SODIUM SERPL-SCNC: 145 MMOL/L (ref 136–145)
SODIUM SERPL-SCNC: 145 MMOL/L (ref 136–145)
SODIUM SERPL-SCNC: 146 MMOL/L (ref 136–145)
WBC # BLD AUTO: 6.82 K/UL (ref 3.9–12.7)

## 2019-07-16 PROCEDURE — 99291 PR CRITICAL CARE, E/M 30-74 MINUTES: ICD-10-PCS | Mod: ,,, | Performed by: PSYCHIATRY & NEUROLOGY

## 2019-07-16 PROCEDURE — 97530 THERAPEUTIC ACTIVITIES: CPT

## 2019-07-16 PROCEDURE — 94761 N-INVAS EAR/PLS OXIMETRY MLT: CPT

## 2019-07-16 PROCEDURE — A4217 STERILE WATER/SALINE, 500 ML: HCPCS | Performed by: PHYSICIAN ASSISTANT

## 2019-07-16 PROCEDURE — 93010 EKG 12-LEAD: ICD-10-PCS | Mod: ,,, | Performed by: INTERNAL MEDICINE

## 2019-07-16 PROCEDURE — 92526 ORAL FUNCTION THERAPY: CPT

## 2019-07-16 PROCEDURE — 63600175 PHARM REV CODE 636 W HCPCS: Performed by: STUDENT IN AN ORGANIZED HEALTH CARE EDUCATION/TRAINING PROGRAM

## 2019-07-16 PROCEDURE — 84100 ASSAY OF PHOSPHORUS: CPT

## 2019-07-16 PROCEDURE — 84295 ASSAY OF SERUM SODIUM: CPT | Mod: 91

## 2019-07-16 PROCEDURE — 83735 ASSAY OF MAGNESIUM: CPT

## 2019-07-16 PROCEDURE — 93010 ELECTROCARDIOGRAM REPORT: CPT | Mod: ,,, | Performed by: INTERNAL MEDICINE

## 2019-07-16 PROCEDURE — 25000003 PHARM REV CODE 250: Performed by: STUDENT IN AN ORGANIZED HEALTH CARE EDUCATION/TRAINING PROGRAM

## 2019-07-16 PROCEDURE — 20000000 HC ICU ROOM

## 2019-07-16 PROCEDURE — 84295 ASSAY OF SERUM SODIUM: CPT

## 2019-07-16 PROCEDURE — 63600175 PHARM REV CODE 636 W HCPCS: Performed by: NURSE PRACTITIONER

## 2019-07-16 PROCEDURE — 99233 PR SUBSEQUENT HOSPITAL CARE,LEVL III: ICD-10-PCS | Mod: ,,, | Performed by: PSYCHIATRY & NEUROLOGY

## 2019-07-16 PROCEDURE — 99291 CRITICAL CARE FIRST HOUR: CPT | Mod: ,,, | Performed by: PSYCHIATRY & NEUROLOGY

## 2019-07-16 PROCEDURE — 80053 COMPREHEN METABOLIC PANEL: CPT

## 2019-07-16 PROCEDURE — 25000003 PHARM REV CODE 250: Performed by: NURSE PRACTITIONER

## 2019-07-16 PROCEDURE — 92507 TX SP LANG VOICE COMM INDIV: CPT

## 2019-07-16 PROCEDURE — 63600175 PHARM REV CODE 636 W HCPCS: Performed by: PHYSICIAN ASSISTANT

## 2019-07-16 PROCEDURE — 85025 COMPLETE CBC W/AUTO DIFF WBC: CPT

## 2019-07-16 PROCEDURE — 25000003 PHARM REV CODE 250: Performed by: PHYSICIAN ASSISTANT

## 2019-07-16 PROCEDURE — 97112 NEUROMUSCULAR REEDUCATION: CPT

## 2019-07-16 PROCEDURE — 93005 ELECTROCARDIOGRAM TRACING: CPT

## 2019-07-16 PROCEDURE — 97535 SELF CARE MNGMENT TRAINING: CPT

## 2019-07-16 PROCEDURE — 99233 SBSQ HOSP IP/OBS HIGH 50: CPT | Mod: ,,, | Performed by: PSYCHIATRY & NEUROLOGY

## 2019-07-16 RX ORDER — HEPARIN SODIUM 5000 [USP'U]/ML
5000 INJECTION, SOLUTION INTRAVENOUS; SUBCUTANEOUS EVERY 8 HOURS
Status: DISCONTINUED | OUTPATIENT
Start: 2019-07-16 | End: 2019-07-19

## 2019-07-16 RX ORDER — SODIUM,POTASSIUM PHOSPHATES 280-250MG
2 POWDER IN PACKET (EA) ORAL
Status: DISCONTINUED | OUTPATIENT
Start: 2019-07-16 | End: 2019-07-26

## 2019-07-16 RX ORDER — POTASSIUM CHLORIDE 20 MEQ/15ML
40 SOLUTION ORAL
Status: DISCONTINUED | OUTPATIENT
Start: 2019-07-16 | End: 2019-07-26

## 2019-07-16 RX ORDER — LANOLIN ALCOHOL/MO/W.PET/CERES
800 CREAM (GRAM) TOPICAL
Status: DISCONTINUED | OUTPATIENT
Start: 2019-07-16 | End: 2019-07-26

## 2019-07-16 RX ADMIN — SODIUM CHLORIDE: 234 INJECTION INTRAMUSCULAR; INTRAVENOUS; SUBCUTANEOUS at 07:07

## 2019-07-16 RX ADMIN — HEPARIN SODIUM 5000 UNITS: 5000 INJECTION, SOLUTION INTRAVENOUS; SUBCUTANEOUS at 02:07

## 2019-07-16 RX ADMIN — POTASSIUM & SODIUM PHOSPHATES POWDER PACK 280-160-250 MG 2 PACKET: 280-160-250 PACK at 06:07

## 2019-07-16 RX ADMIN — ATORVASTATIN CALCIUM 40 MG: 20 TABLET, FILM COATED ORAL at 08:07

## 2019-07-16 RX ADMIN — HEPARIN SODIUM 5000 UNITS: 5000 INJECTION, SOLUTION INTRAVENOUS; SUBCUTANEOUS at 10:07

## 2019-07-16 RX ADMIN — POTASSIUM & SODIUM PHOSPHATES POWDER PACK 280-160-250 MG 2 PACKET: 280-160-250 PACK at 10:07

## 2019-07-16 RX ADMIN — POTASSIUM & SODIUM PHOSPHATES POWDER PACK 280-160-250 MG 2 PACKET: 280-160-250 PACK at 02:07

## 2019-07-16 RX ADMIN — ONDANSETRON 8 MG: 2 INJECTION INTRAMUSCULAR; INTRAVENOUS at 07:07

## 2019-07-16 RX ADMIN — SODIUM CHLORIDE: 234 INJECTION INTRAMUSCULAR; INTRAVENOUS; SUBCUTANEOUS at 03:07

## 2019-07-16 RX ADMIN — SODIUM CHLORIDE: 234 INJECTION INTRAMUSCULAR; INTRAVENOUS; SUBCUTANEOUS at 11:07

## 2019-07-16 RX ADMIN — SENNOSIDES,DOCUSATE SODIUM 1 TABLET: 8.6; 5 TABLET, FILM COATED ORAL at 08:07

## 2019-07-16 RX ADMIN — FOLIC ACID 1 MG: 1 TABLET ORAL at 08:07

## 2019-07-16 RX ADMIN — Medication 100 MG: at 08:07

## 2019-07-16 NOTE — PLAN OF CARE
Problem: Adult Inpatient Plan of Care  Goal: Plan of Care Review  POC reviewed with pt at 0400. Pt unable to verbalize understanding due to expressive aphasia. Wife verbalized understanding. Questions and concerns addressed. Pt remained afebrile overnight. MRI not done overnight because pt needs to be cleared by cardiology before proceeding. 2% continued. No acute events overnight. Pt progressing toward goals. Will continue to monitor. See flowsheets for full assessment and VS info

## 2019-07-16 NOTE — ASSESSMENT & PLAN NOTE
Stroke risk factor  Rate control  CHADVASC score: 2  HAS BLED score: 2  Needs anticoagulation will wait for MRI brain results

## 2019-07-16 NOTE — SUBJECTIVE & OBJECTIVE
Interval History: Awaiting confirmation that atrial device from prior surgery is safe to scan via MRI.  Patient more alert today, following commands.  Provided electrolyte replacement.     Review of Systems: Unable to obtain a complete ROS due to level of consciousness.     Vitals:   Temp: 99.1 °F (37.3 °C)  Pulse: (!) 47  Rhythm: normal sinus rhythm  BP: (!) 141/79  MAP (mmHg): 104  Resp: 14  SpO2: 97 %  O2 Device (Oxygen Therapy): room air    Temp  Min: 98.8 °F (37.1 °C)  Max: 99.6 °F (37.6 °C)  Pulse  Min: 46  Max: 84  BP  Min: 107/59  Max: 162/83  MAP (mmHg)  Min: 78  Max: 115  Resp  Min: 11  Max: 28  SpO2  Min: 96 %  Max: 100 %    07/15 0701 - 07/16 0700  In: 2858.6 [P.O.:150; I.V.:2708.6]  Out: 1600 [Urine:1600]   Unmeasured Output  Urine Occurrence: 1  Stool Occurrence: 2  Emesis Occurrence: 1     Examination:   Constitutional: Well-nourished and -developed. No apparent distress.   Eyes: Conjunctiva clear, anicteric. Lids no lesions.  Head/Ears/Nose/Mouth/Throat/Neck: Moist mucous membranes. External ears, nose atraumatic.   Cardiovascular: Regular rhythm. No murmurs. No leg edema.  Respiratory: Comfortable respirations. Clear to auscultation.  Gastrointestinal: No hernia. Soft, nondistended, nontender. + bowel sounds.    Neurologic:  -GCS E4V5M6  -More Alert than yesterday but continues to be drowsy throughout the day. Oriented to person and place but not time. Expressive aphasia. Follows commands.  -Cranial nerves II-XII grossly intact  -No spontaneous movement of RUE noted; strength 0/5.  Distal RLE lack of spontaneous movement below the knee; strength (0/5). Decreased light tough sensation noted in RUE and RLE. Sensation to pin prick in tact in RUE and RLE.  LUE and LLE exhibit full strength 5/5 and sensation.    Medications:   Continuous  niCARdipine Last Rate: Stopped (07/14/19 0330)   Sodium Chloride 2% Last Rate: 125 mL/hr at 07/16/19 1600   Scheduled  atorvastatin 40 mg Daily   folic acid 1 mg Daily    heparin (porcine) 5,000 Units Q8H   senna-docusate 8.6-50 mg 1 tablet BID   thiamine 100 mg Daily   PRN  acetaminophen 650 mg Q6H PRN   magnesium oxide 800 mg PRN   magnesium oxide 800 mg PRN   ondansetron 8 mg Q8H PRN   potassium chloride 10% 40 mEq PRN   potassium chloride 10% 40 mEq PRN   potassium chloride 10% 40 mEq PRN   potassium, sodium phosphates 2 packet PRN   potassium, sodium phosphates 2 packet PRN   potassium, sodium phosphates 2 packet PRN   sodium chloride 0.9% 10 mL PRN      Today I independently reviewed pertinent medications, lines/drains/airways, imaging, laboratory results,      ISTAT: No results for input(s): PH, PCO2, PO2, POCSATURATED, HCO3, BE, POCNA, POCK, POCTCO2, POCGLU, POCICA, POCLAC, SAMPLE in the last 24 hours.   Chem: Recent Labs   Lab 07/16/19  0020  07/16/19  1200     145   < > 144   K 4.1  --   --    *  --   --    CO2 25  --   --    *  --   --    BUN 6  --   --    CREATININE 0.8  --   --    ESTGFRAFRICA >60.0  --   --    EGFRNONAA >60.0  --   --    CALCIUM 7.6*  --   --    MG 1.8  --   --    PHOS 1.2*  --   --    ANIONGAP 4*  --   --    PROT 5.1*  --   --    ALBUMIN 2.7*  --   --    BILITOT 0.3  --   --    ALKPHOS 39*  --   --    AST 22  --   --    ALT 14  --   --     < > = values in this interval not displayed.     Heme: Recent Labs   Lab 07/16/19  0020   WBC 6.82   HGB 11.2*   HCT 34.9*   *     Endo:   Recent Labs   Lab 07/15/19  2338 07/16/19  0617 07/16/19  1200   POCTGLUCOSE 180* 134* 122*        Diet  Diet Dysphagia Mechanical Soft (IDDSI Level 5) Thin  Diet Dysphagia Mechanical Soft (IDDSI Level 5) Thin

## 2019-07-16 NOTE — PT/OT/SLP PROGRESS
Physical Therapy Treatment    Patient Name:  Justyn Rawls   MRN:  [id number]    Recommendations:     Discharge Recommendations:  rehabilitation facility   Discharge Equipment Recommendations: none   Barriers to discharge: increased level of assistance required; fall risk    Assessment:     Justyn Rawls is a 49 y.o. male admitted with a medical diagnosis of Stroke due to embolism of left middle cerebral artery. Pt demonstrating progress towards goals, including improvement in sup<>sit and sit<>stand transfers. Mr. Rawls is not at OF and presents with the following impairments/functional limitations:  weakness, impaired endurance, impaired self care skills, gait instability, impaired balance, impaired functional mobilty, impaired cognition, decreased upper extremity function, decreased lower extremity function, decreased coordination, decreased safety awareness, decreased ROM, impaired cardiopulmonary response to activity. Pt remains an excellent candidate for inpatient rehabilitation, as he has a qualifying diagnosis, displays increasing activity tolerance, participates in therapy, and has a strong support system willing to assist the pt upon discharge    Rehab Prognosis: Good; patient would benefit from acute skilled PT services to address these deficits and reach maximum level of function.      Recent Surgery: * No surgery found *      Plan:     During this hospitalization, patient to be seen 4 x/week to address the identified rehab impairments via gait training, therapeutic activities, therapeutic exercises, neuromuscular re-education and progress toward the following goals:    · Plan of Care Expires:  08/13/19    Subjective     Patient/Family Comments/goals: pt unable to clearly state goals 2/2 aphasia; pt agreeable to participate in session with encouragement   Pain/Comfort:  · Pain Rating 1: 0/10  · Pain Rating Post-Intervention 1: 0/10      Objective:     Communicated with RN prior to session.  Patient  "found supine with arterial line, bed alarm, blood pressure cuff, peripheral IV, pulse ox (continuous), SCD, telemetry upon PT entry to room.     General Precautions: Standard, fall, aphasia   Orthopedic Precautions:N/A   Braces: N/A     Functional Mobility:    Bed Mobility:   · Supine > Sit: moderate assistance   · Sit > Supine: minimum assistance   · Scooting: towards HOB in supine with minimum assistance     Transfers:   · Sit <> Stand Transfer: minimum assistance x 2 persons for bilateral support from EOB x 3 trials with no AD   · Pt required blocking of RLE 2/2 knee buckling   · Pt impulsive during trials of t/f, standing quickly prior to instruction to start and with poor immediate standing balance     Standing Balance:   · Assistance level: maximum assistance with no AD   · Duration: ~30 second trials x 3   · Postural deviation(s) noted: R lateral and anterior lean, R knee buckling                  Gait: N/T due to safety concerns; pt with impulsivity and poor safety awareness re: multiple medical lines and balance deficits.     Therapeutic Activities and Exercises:  Pt alert, but with minimal verbal responses during session, primarily answering "yes" or "no." Presents with aphasia, and difficulty communicating needs throughout. Therapeutic activities aimed to increase pt's independence, safety, and efficiency with bed mobility and functional transfers. See above for assistance levels.  Pt able to sit EOB with CGA, no LOB in static sitting. Impulsivity noted throughout session, when pt preparing for transitional movements (I.e. Sup<>sit and sit<>stand). Required max cueing for safety with medical lines. Pt unable to demonstrate AROM of RLE and RUE- PROM performed to RLE x 15 reps in sitting to encourage increased functional ROM.     Patient left HOB elevated with all lines intact, call button in reach, bed alarm on and RN notified.    AM-PAC 6 CLICK MOBILITY  Turning over in bed (including adjusting " bedclothes, sheets and blankets)?: 3  Sitting down on and standing up from a chair with arms (e.g., wheelchair, bedside commode, etc.): 2  Moving from lying on back to sitting on the side of the bed?: 2  Moving to and from a bed to a chair (including a wheelchair)?: 2  Need to walk in hospital room?: 1  Climbing 3-5 steps with a railing?: 1  Basic Mobility Total Score: 11       GOALS:   Multidisciplinary Problems     Physical Therapy Goals        Problem: Physical Therapy Goal    Goal Priority Disciplines Outcome Goal Variances Interventions   Physical Therapy Goal     PT, PT/OT Ongoing (interventions implemented as appropriate)     Description:  PT goals until 7/28/19    1. Pt supine to sit with minimal assist-not met  2. Pt sit to supine with minimal assist-not met  3. Pt sit to stand with LRAD with minimal assist-not met  4. Pt to perform gait 20ft with LRAD with max assist.-not met  5. Pt to go up/down curb step with LRAD with max assist.-not met  6. Pt to transfer bed to/from bedside chair with moderate assist.-not met  7. Pt to perform B LE exs in sitting or supine x 20 reps to strengthen B LE to improve functional mobility.-not met                       Time Tracking:     PT Received On: 07/16/19  PT Start Time: 1323     PT Stop Time: 1338  PT Total Time (min): 15 min     Billable Minutes: Therapeutic Activity 15 min    Treatment Type: Treatment  PT/PTA: PT     PTA Visit Number: 0     Opal Manzano PT, DPT   7/16/2019  Pager: [phone]

## 2019-07-16 NOTE — SUBJECTIVE & OBJECTIVE
Interval History:  Continued 2%NS and mannitol.  Patient planned for MRI wo contrast tomorrow morning with chemical DVT ppx following.       Review of Systems:   Review of Systems   Constitutional: Negative for chills and fever.   HENT: Negative for congestion and sore throat.    Eyes: Negative for blurred vision and double vision.   Respiratory: Negative for cough and shortness of breath.    Cardiovascular: Negative for chest pain and palpitations.   Gastrointestinal: Negative for abdominal pain and nausea.   Genitourinary: Negative for dysuria and urgency.   Musculoskeletal: Negative for back pain and joint pain.   Skin: Negative for rash.   Neurological: Positive for focal weakness and weakness.   Endo/Heme/Allergies: Negative for polydipsia. Does not bruise/bleed easily.       Vitals:   Temp: 99.3 °F (37.4 °C)  Pulse: (!) 51  Rhythm: normal sinus rhythm  BP: (!) 144/87  MAP (mmHg): 111  Resp: 18  SpO2: 98 %  O2 Device (Oxygen Therapy): room air    Temp  Min: 98.8 °F (37.1 °C)  Max: 99.6 °F (37.6 °C)  Pulse  Min: 46  Max: 84  BP  Min: 107/59  Max: 160/84  MAP (mmHg)  Min: 78  Max: 114  Resp  Min: 11  Max: 28  SpO2  Min: 96 %  Max: 100 %    07/15 0701 - 07/16 0700  In: 2858.6 [P.O.:150; I.V.:2708.6]  Out: 1600 [Urine:1600]   Unmeasured Output  Urine Occurrence: 1  Stool Occurrence: 2  Emesis Occurrence: 1     Examination:   Constitutional: Well-nourished and -developed. No apparent distress.   Eyes: Conjunctiva clear, anicteric. Lids no lesions.  Head/Ears/Nose/Mouth/Throat/Neck: Moist mucous membranes. External ears, nose atraumatic.   Cardiovascular: Regular rhythm. No murmurs. No leg edema.  Respiratory: Comfortable respirations. Clear to auscultation.  Gastrointestinal: No hernia. Soft, nondistended, nontender. + bowel sounds.    Neurologic:  -GCS E3V5M6  -Patient is drowsy. Oriented to person and place, mild confusion with command following. Speech dysarthric and aphasia. Follows commands.  -Cranial nerves  II-XII grossly intact  -Right sided facial droop  -RUE strength: 0/5  -RLE strength: 5/5 hip flexion and extension; 0/5 knee flexion and extension; 0/5 plantar flexion/extension  -LUE strength:5/5  -LLE strength: 5/5      Medications:   Continuous    niCARdipine Last Rate: Stopped (07/14/19 0330)   Sodium Chloride 2% Last Rate: 125 mL/hr at 07/16/19 1300   Scheduled    atorvastatin 40 mg Daily   folic acid 1 mg Daily   heparin (porcine) 5,000 Units Q8H   senna-docusate 8.6-50 mg 1 tablet BID   thiamine 100 mg Daily   PRN    acetaminophen 650 mg Q6H PRN   magnesium oxide 800 mg PRN   magnesium oxide 800 mg PRN   ondansetron 8 mg Q8H PRN   potassium chloride 10% 40 mEq PRN   potassium chloride 10% 40 mEq PRN   potassium chloride 10% 40 mEq PRN   potassium, sodium phosphates 2 packet PRN   potassium, sodium phosphates 2 packet PRN   potassium, sodium phosphates 2 packet PRN   sodium chloride 0.9% 10 mL PRN      Today I independently reviewed pertinent medications, lines/drains/airways, imaging,     ISTAT: No results for input(s): PH, PCO2, PO2, POCSATURATED, HCO3, BE, POCNA, POCK, POCTCO2, POCGLU, POCICA, POCLAC, SAMPLE in the last 24 hours.   Chem:   Recent Labs   Lab 07/16/19 0020 07/16/19  1200     145   < > 144   K 4.1  --   --    *  --   --    CO2 25  --   --    *  --   --    BUN 6  --   --    CREATININE 0.8  --   --    ESTGFRAFRICA >60.0  --   --    EGFRNONAA >60.0  --   --    CALCIUM 7.6*  --   --    MG 1.8  --   --    PHOS 1.2*  --   --    ANIONGAP 4*  --   --    PROT 5.1*  --   --    ALBUMIN 2.7*  --   --    BILITOT 0.3  --   --    ALKPHOS 39*  --   --    AST 22  --   --    ALT 14  --   --     < > = values in this interval not displayed.     Heme:   Recent Labs   Lab 07/16/19 0020   WBC 6.82   HGB 11.2*   HCT 34.9*   *     Endo:   Recent Labs   Lab 07/15/19  2338 07/16/19  0617 07/16/19  1200   POCTGLUCOSE 180* 134* 122*          Today I personally reviewed pertinent medications,  imaging, notably:    Diet  Diet Dysphagia Mechanical Soft (IDDSI Level 5) Thin  Diet Dysphagia Mechanical Soft (IDDSI Level 5) Thin    Review of Systems   Constitutional: Negative for chills and fever.   HENT: Negative for congestion and sore throat.    Eyes: Negative for blurred vision and double vision.   Respiratory: Negative for cough and shortness of breath.    Cardiovascular: Negative for chest pain and palpitations.   Gastrointestinal: Negative for abdominal pain and nausea.   Endocrine: Negative for polydipsia.   Genitourinary: Negative for dysuria and urgency.   Musculoskeletal: Negative for back pain and joint pain.   Skin: Negative for rash.   Neurological: Positive for focal weakness and weakness.   Hematological: Does not bruise/bleed easily.       Physical Exam

## 2019-07-16 NOTE — PROGRESS NOTES
Dr. Jerod John with [facility] in [place] got back to patient's wife about Watchman device. Per Dr. John, patient has a 21mm device, and it is conditionally approved for MRI. He also recommended a ABDIAS to assess for presence of a clot on the Watchman.

## 2019-07-16 NOTE — PLAN OF CARE
Problem: Physical Therapy Goal  Goal: Physical Therapy Goal  PT goals until 7/28/19    1. Pt supine to sit with minimal assist-not met  2. Pt sit to supine with minimal assist-not met  3. Pt sit to stand with LRAD with minimal assist-not met  4. Pt to perform gait 20ft with LRAD with max assist.-not met  5. Pt to go up/down curb step with LRAD with max assist.-not met  6. Pt to transfer bed to/from bedside chair with moderate assist.-not met  7. Pt to perform B LE exs in sitting or supine x 20 reps to strengthen B LE to improve functional mobility.-not met     Outcome: Ongoing (interventions implemented as appropriate)  Goals remain appropriate; continue current POC.     Opal Manzano PT, DPT   7/16/2019  Pager: [phone]

## 2019-07-16 NOTE — PLAN OF CARE
Problem: Adult Inpatient Plan of Care  Goal: Plan of Care Review  Outcome: Ongoing (interventions implemented as appropriate)  POC reviewed with pt and family at 1400. Pt and family verbalized understanding. Questions and concerns addressed. No acute events today. Pt progressing toward goals. Will continue to monitor. See flowsheets for full assessment and VS info.     Waiting on cardiology clearance for MRI.  Replacing phos.  Monitoring sodium Q6 hours. On 2% NS @ 125cc/hr.  EKG complete - sinus juan luis.  Holding senna for loose stool.

## 2019-07-16 NOTE — PLAN OF CARE
Problem: Occupational Therapy Goal  Goal: Occupational Therapy Goal  Goals set 7/15 to be met by 7/29   Patient will increase functional independence with ADLs by performing:    UE Dressing with Minimal Assistance with hemiplegic technique.  LE Dressing with Minimal Assistance.  Grooming while standing with Contact Guard Assistance.   Toileting from bedside commode with Minimal Assistance for hygiene and clothing management.   Rolling to Left with Contact Guard Assistance.   Rolling to Right with Supervision.   Stand pivot transfers with Contact Guard Assistance.  Patient/Caregivers will be independent in assisting in bed mobility/positioning  Patient/Caregivers will be independent in HEP for weightbearing with RUE, PROM of RUE.       Goals remain appropriate.  Yesenia Hernandez, LOTR  07/16/2019
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
## 2019-07-16 NOTE — PROGRESS NOTES
Received a call from Dr. Heather Osei in relation to this patient needing an MRI.  It was found that this patient has a Watchman device and does not have an ICD or Pacemaker.  Informed Dr. Osei that they would need to contact [employer] for any information as it relates to the Watchman device.  The doctor then asked if someone from the Device Clinic would be putting in a note about the device and what if anything needs to be done as it relates to the Watchman Device and MRI.  Informed the doctor that this patient would not be seen by anyone in the Device Clinic as to the patient does not have a Pacemaker or ICD.    No midline tenderness   L anterior shin TTP   B/L LE swelling with L > R   5/5 strength , sensation intact intact  compartments soft, moving all digits, pulses intact , extremities warm and even, cap refill normal  no obvious bony deformity  EOM intact  no skull tenderness of step offs  TMs clear b/l  No septal hematoma

## 2019-07-16 NOTE — PRE ADMISSION SCREENING
[facility] was asked to follow this patient for iprehab. Will follow daily, My cell is [phone].
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
## 2019-07-16 NOTE — PROGRESS NOTES
Phos 1.2. JAILYN Escalona notified. No new orders to replace at this time. Will continue to monitor.

## 2019-07-16 NOTE — PROGRESS NOTES
[facility]  Vascular Neurology  Comprehensive Stroke Center  Progress Note    Assessment/Plan:     * Stroke due to embolism of left middle cerebral artery  48 y/o male with L MCA stroke due to M1 occlusion received IV TPA and thrombectomy to TICI 2C, probable due to afib    Antithrombotics: Need anticoagulation after TPA window    Statins: Lipitor 40 mg daily    Aggressive risk factor modification: A-Fib, alcohol     Rehab efforts: The patient has been evaluated by a stroke team provider and the therapy needs have been fully considered based off the presenting complaints and exam findings. The following therapy evaluations are needed: PT evaluate and treat, OT evaluate and treat, SLP evaluate and treat, PM&R evaluate for appropriate placement    Diagnostics ordered/pending: HgbA1C to assess blood glucose levels, Lipid Profile to assess cholesterol levels, MRI head without contrast to assess brain parenchyma, TTE to assess cardiac function/status     VTE prophylaxis: SCD's raquel begin Heparin 5000 units sc Q8H on 7-15-10 at 0600    BP parameters: Infarct: Post sucessful thrombectomy, SBP <140        Aphasia  Due to stroke  Aggressive therapy    Hemiparesis, right  Due to stroke  Aggressive therapy    S/P admn tPA in diff fac w/n last 24 hr bef adm to crnt fac  Close monitoring in [facility] 24 hours post administration         PAF (paroxysmal atrial fibrillation)  Stroke risk factor  Rate control  CHADVASC score: 2  HAS BLED score: 2  Needs anticoagulation will wait for MRI brain results         Patient was admitted on 07/14 with L MCA ischemic stroke resulted in right side weakness, aphasia, facial drop and LT gaze prefrance. NIH on admission 13. He is S/P tpa and thrombectomy     STROKE DOCUMENTATION   Acute Stroke Times   Last Known Normal Date: 07/13/19  Last Known Normal Time: 2130  Symptom Onset Date: 07/13/19  Symptom Onset Time: 2130  Stroke Team Called Date: 07/13/19  Stroke Team Called Time:  2245  Stroke Team Arrival Date: 07/14/19  Stroke Team Arrival Time: 0107  CT Interpretation Time: 2251  Decision to Treat Time for Alteplase: 2307(bolus given)  Decision to Treat Time for IR: 0107        NIH Scale:  1a. Level of Consciousness: 0-->Alert, keenly responsive  1b. LOC Questions: 1-->Answers one question correctly  1c. LOC Commands: 0-->Performs both tasks correctly  2. Best Gaze: 0-->Normal  3. Visual: 1-->Partial hemianopia  4. Facial Palsy: 2-->Partial paralysis (total or near-total paralysis of lower face)  5a. Motor Arm, Left: 0-->No drift, limb holds 90 (or 45) degrees for full 10 secs  5b. Motor Arm, Right: 3-->No effort against gravity, limb falls  6a. Motor Leg, Left: 0-->No drift, leg holds 30 degree position for full 5 secs  6b. Motor Leg, Right: 2-->Some effort against gravity, leg falls to bed by 5 secs, but has some effort against gravity  7. Limb Ataxia: 0-->Absent  8. Sensory: 0-->Normal, no sensory loss  9. Best Language: 1-->Mild-to-moderate aphasia, some obvious loss of fluency or facility of comprehension, without significant limitation on ideas expressed or form of expression. Reduction of speech and/or comprehension, however, makes conversation. . . (see row details)  10. Dysarthria: 1-->Mild-to-moderate dysarthria, patient slurs at least some words and, at worst, can be understood with some difficulty  11. Extinction and Inattention (formerly Neglect): 0-->No abnormality  Total (NIH Stroke Scale): 11              Modified Kerry Score: 0  [place] Coma Scale:15   ABCD2 Score:    [id number] Score:2  HAS -BLED Score:2  ICH Score:   Hunt & Ta Classification:          Interval History:  Continued 2%NS and mannitol.  Patient planned for MRI wo contrast tomorrow morning with chemical DVT ppx following.       Review of Systems:   Review of Systems   Constitutional: Negative for chills and fever.   HENT: Negative for congestion and sore throat.    Eyes: Negative for blurred vision and  double vision.   Respiratory: Negative for cough and shortness of breath.    Cardiovascular: Negative for chest pain and palpitations.   Gastrointestinal: Negative for abdominal pain and nausea.   Genitourinary: Negative for dysuria and urgency.   Musculoskeletal: Negative for back pain and joint pain.   Skin: Negative for rash.   Neurological: Positive for focal weakness and weakness.   Endo/Heme/Allergies: Negative for polydipsia. Does not bruise/bleed easily.       Vitals:   Temp: 99.3 °F (37.4 °C)  Pulse: (!) 51  Rhythm: normal sinus rhythm  BP: (!) 144/87  MAP (mmHg): 111  Resp: 18  SpO2: 98 %  O2 Device (Oxygen Therapy): room air    Temp  Min: 98.8 °F (37.1 °C)  Max: 99.6 °F (37.6 °C)  Pulse  Min: 46  Max: 84  BP  Min: 107/59  Max: 160/84  MAP (mmHg)  Min: 78  Max: 114  Resp  Min: 11  Max: 28  SpO2  Min: 96 %  Max: 100 %    07/15 0701 - 07/16 0700  In: 2858.6 [P.O.:150; I.V.:2708.6]  Out: 1600 [Urine:1600]   Unmeasured Output  Urine Occurrence: 1  Stool Occurrence: 2  Emesis Occurrence: 1     Examination:   Constitutional: Well-nourished and -developed. No apparent distress.   Eyes: Conjunctiva clear, anicteric. Lids no lesions.  Head/Ears/Nose/Mouth/Throat/Neck: Moist mucous membranes. External ears, nose atraumatic.   Cardiovascular: Regular rhythm. No murmurs. No leg edema.  Respiratory: Comfortable respirations. Clear to auscultation.  Gastrointestinal: No hernia. Soft, nondistended, nontender. + bowel sounds.    Neurologic:  -GCS E3V5M6  -Patient is drowsy. Oriented to person and place, mild confusion with command following. Speech dysarthric and aphasia. Follows commands.  -Cranial nerves II-XII grossly intact  -Right sided facial droop  -RUE strength: 0/5  -RLE strength: 5/5 hip flexion and extension; 0/5 knee flexion and extension; 0/5 plantar flexion/extension  -LUE strength:5/5  -LLE strength: 5/5      Medications:   Continuous    niCARdipine Last Rate: Stopped (07/14/19 0330)   Sodium Chloride 2%  Last Rate: 125 mL/hr at 07/16/19 1300   Scheduled    atorvastatin 40 mg Daily   folic acid 1 mg Daily   heparin (porcine) 5,000 Units Q8H   senna-docusate 8.6-50 mg 1 tablet BID   thiamine 100 mg Daily   PRN    acetaminophen 650 mg Q6H PRN   magnesium oxide 800 mg PRN   magnesium oxide 800 mg PRN   ondansetron 8 mg Q8H PRN   potassium chloride 10% 40 mEq PRN   potassium chloride 10% 40 mEq PRN   potassium chloride 10% 40 mEq PRN   potassium, sodium phosphates 2 packet PRN   potassium, sodium phosphates 2 packet PRN   potassium, sodium phosphates 2 packet PRN   sodium chloride 0.9% 10 mL PRN      Today I independently reviewed pertinent medications, lines/drains/airways, imaging,     ISTAT: No results for input(s): PH, PCO2, PO2, POCSATURATED, HCO3, BE, POCNA, POCK, POCTCO2, POCGLU, POCICA, POCLAC, SAMPLE in the last 24 hours.   Chem:   Recent Labs   Lab 07/16/19  0020  07/16/19  1200     145   < > 144   K 4.1  --   --    *  --   --    CO2 25  --   --    *  --   --    BUN 6  --   --    CREATININE 0.8  --   --    ESTGFRAFRICA >60.0  --   --    EGFRNONAA >60.0  --   --    CALCIUM 7.6*  --   --    MG 1.8  --   --    PHOS 1.2*  --   --    ANIONGAP 4*  --   --    PROT 5.1*  --   --    ALBUMIN 2.7*  --   --    BILITOT 0.3  --   --    ALKPHOS 39*  --   --    AST 22  --   --    ALT 14  --   --     < > = values in this interval not displayed.     Heme:   Recent Labs   Lab 07/16/19  0020   WBC 6.82   HGB 11.2*   HCT 34.9*   *     Endo:   Recent Labs   Lab 07/15/19  2338 07/16/19  0617 07/16/19  1200   POCTGLUCOSE 180* 134* 122*          Today I personally reviewed pertinent medications, imaging, notably:    Diet  Diet Dysphagia Mechanical Soft (IDDSI Level 5) Thin  Diet Dysphagia Mechanical Soft (IDDSI Level 5) Thin    Review of Systems   Constitutional: Negative for chills and fever.   HENT: Negative for congestion and sore throat.    Eyes: Negative for blurred vision and double vision.    Respiratory: Negative for cough and shortness of breath.    Cardiovascular: Negative for chest pain and palpitations.   Gastrointestinal: Negative for abdominal pain and nausea.   Endocrine: Negative for polydipsia.   Genitourinary: Negative for dysuria and urgency.   Musculoskeletal: Negative for back pain and joint pain.   Skin: Negative for rash.   Neurological: Positive for focal weakness and weakness.   Hematological: Does not bruise/bleed easily.       Physical Exam        Zainab Caceres MD  [facility]  Department of Vascular Neurology   [facility]

## 2019-07-16 NOTE — PT/OT/SLP PROGRESS
"Speech Language Pathology Treatment    Patient Name:  Justyn Rawls   MRN:  [id number]  Admitting Diagnosis: Stroke due to embolism of left middle cerebral artery    Recommendations:                 General Recommendations:  Speech/language therapy  Diet recommendations:  Dental Soft, Liquid Diet Level: Thin   Aspiration Precautions: 1 bite/sip at a time, Eliminate distractions, Feed only when awake/alert, Meds whole 1 at a time and Standard aspiration precautions   General Precautions: Standard, aphasia, fall, vision impaired  Communication strategies:  provide increased time to answer and offer binary choice when struggling to find words     Subjective     Pt asleep upon arrival, multiple family members at the bedside      Pain/Comfort:  · Pain Rating 1: 0/10  · Pain Rating Post-Intervention 1: 0/10    Objective:     Has the patient been evaluated by SLP for swallowing?   Yes  Keep patient NPO? No   Current Respiratory Status:    Room air    Pt remains lethargic and warranting max tactile cueing (sternal rub, pressure to nail beds etc) to rouse pt to participate in therapy session. Pt completed auto speech tasks w/ 100% acc this session independently. Pt also completed responsive naming task w/ 100% acc independently. Pt completed confrontation naming task w/ 30% acc with SLP mod- max verbal cueing. Pt verbal responses often significant for phonemic and semantic paraphasias (such as "cakeed" for "cake", and "garage" for "tiger").Pt appears to greatly benefit from phonemic cueing to assist in word retrieval. Pt continues with evident right sided neglect but did demonstrate ability to scan across midline x4 this therapy session with SLP max cueing across all modalities. During simple reading task at the word level pt completed with 75% acc with SLP moderate cueing to attend to the letter to the right beyond midline. Pt followed single step directions with 0% acc despite SLP model, limitations appearing to be a " combination of comprehension and motor planning deficits.      Family asking about limitations with straw use for meals. Family reports pt otherwise tolerating diet of dental soft solids and thin liqiuds without difficulty. SLP assessed pt with thin liquids and cyclic straw sips. Pt with functional ability to extract fluid from straw and no clinical signs of aspiration following consumption of 3oz of thin liqiuds. SLP liberated pt to be able to drink thin liquids from straws and family pleased with advancement. Family attentive and engaged in therapy session. SLP reviewed with family members cueing hiearcharchy to assist with word finding difficulties to include item description -->phonemic cue--> binary choice. Family demonstrated understanding and agreement with plan.       Assessment:     Justyn Rawls is a 49 y.o. male with an SLP diagnosis of Aphasia, Dysarthria and Apraxia.       Goals:   Multidisciplinary Problems     SLP Goals        Problem: SLP Goal    Goal Priority Disciplines Outcome   SLP Goal     SLP Ongoing (interventions implemented as appropriate)   Description:  Speech Language Pathology Goals  Goals expected to be met by 7/21    1. Pt will tolerate diet of thin liquids and dental soft solids without overt clinical signs of aspiration   2. Pt will participate in ongoing assessment of speech language and cognitive linguistic skills to help rule out deficits and determine therapeutic plan of care   Updated additional goals:   1. Pt will participate in simple visual scanning tasks with 80% accuracy and mod cues.  2. Pt will complete OMEs x10 with SLP and independently   3. Pt will complete orientation tasks with 80% accuracy and mod cues.  4. Pt will complete simple naming tasks with 50% accuracy and mod cues.   5. Pt will follow simple commands with 50% accuracy and mod cues.  6. Pt will complete automatic speech tasks with 80% accuracy and mod cues.  7. Pt will answer Y/N questions with 60%  accuracy and mod cues.   8. Pt will participate in ongoing assessment of reading and writing skills.                        Plan:     · Patient to be seen:  4 x/week   · Plan of Care expires:     · Plan of Care reviewed with:  patient, family   · SLP Follow-Up:  Yes       Discharge recommendations:  rehabilitation facility   Barriers to Discharge:  Level of Skilled Assistance Needed and inability to functionally communicate wants and needs     Time Tracking:     SLP Treatment Date:   07/16/19  Speech Start Time:  0844  Speech Stop Time:  0902     Speech Total Time (min):  18 min    Billable Minutes: Speech Therapy Individual 9 and Treatment Swallowing Dysfunction 9    Denita Boles CCC-SLP  07/16/2019

## 2019-07-16 NOTE — ASSESSMENT & PLAN NOTE
- s/p tPA over 24 hours ago  - CTA showed prox L MCA occlusion s/p thrombectomy w/ TICI2c flows post intervention  - started 2% hypertonic saline and mannitol for edema  - MRI delayed due left atrial device placement. Discussed with wife about obtaining documentation.   - vas neurology following, appreciate recs  - HOB flat post procedure  - SBP <160  - q1 neurochecks  - hold anticoagulation  - echo preformed on 7/14: EF 60% with normal LV systolic/ diastolic function and normal RV systolic function with mild tricuspid regurgitation.  - Per speech eval, placed on dysphagia diet. Appreciate further recs.  - PT/OT/ST

## 2019-07-16 NOTE — PLAN OF CARE
Problem: SLP Goal  Goal: SLP Goal  Speech Language Pathology Goals  Goals expected to be met by 7/21    1. Pt will tolerate diet of thin liquids and dental soft solids without overt clinical signs of aspiration   2. Pt will participate in ongoing assessment of speech language and cognitive linguistic skills to help rule out deficits and determine therapeutic plan of care   Updated additional goals:   1. Pt will participate in simple visual scanning tasks with 80% accuracy and mod cues.  2. Pt will complete OMEs x10 with SLP and independently   3. Pt will complete orientation tasks with 80% accuracy and mod cues.  4. Pt will complete simple naming tasks with 50% accuracy and mod cues.   5. Pt will follow simple commands with 50% accuracy and mod cues.  6. Pt will complete automatic speech tasks with 80% accuracy and mod cues.  7. Pt will answer Y/N questions with 60% accuracy and mod cues.   8. Pt will participate in ongoing assessment of reading and writing skills.         Pt with slow and stead progress towards goals. Current plan of care remains appropriate     Denita Boles MS, CCC-SLP  Speech Language Pathologist  Pager: [phone]  Date 7/16/2019

## 2019-07-16 NOTE — PROGRESS NOTES
Due to pt's cardiac device (Watchmen), pt must be cleared for MRI by cardiology. JAILYN Escalona to be in touch with cardiology today about pt clearance. Plan for MRI during day shift.

## 2019-07-16 NOTE — PROGRESS NOTES
Received consult for inpatient rehab in [place].  Patient is appropriate but lives closer to [facility].  I will follow his case. My cell is [phone].

## 2019-07-17 PROBLEM — G93.41 ACUTE METABOLIC ENCEPHALOPATHY: Status: ACTIVE | Noted: 2019-07-17

## 2019-07-17 PROBLEM — G93.5 BRAIN COMPRESSION: Status: ACTIVE | Noted: 2019-07-17

## 2019-07-17 PROBLEM — G93.6 CYTOTOXIC BRAIN EDEMA: Status: ACTIVE | Noted: 2019-07-17

## 2019-07-17 PROBLEM — Z73.6 LIMITATION OF ACTIVITIES DUE TO DISABILITY: Status: ACTIVE | Noted: 2019-07-17

## 2019-07-17 LAB
ALBUMIN SERPL BCP-MCNC: 2.6 G/DL (ref 3.5–5.2)
ALP SERPL-CCNC: 42 U/L (ref 55–135)
ALT SERPL W/O P-5'-P-CCNC: 14 U/L (ref 10–44)
ANION GAP SERPL CALC-SCNC: 6 MMOL/L (ref 8–16)
AST SERPL-CCNC: 21 U/L (ref 10–40)
BASOPHILS # BLD AUTO: 0.05 K/UL (ref 0–0.2)
BASOPHILS NFR BLD: 0.7 % (ref 0–1.9)
BILIRUB SERPL-MCNC: 0.4 MG/DL (ref 0.1–1)
BUN SERPL-MCNC: 5 MG/DL (ref 6–20)
CALCIUM SERPL-MCNC: 8 MG/DL (ref 8.7–10.5)
CHLORIDE SERPL-SCNC: 119 MMOL/L (ref 95–110)
CO2 SERPL-SCNC: 22 MMOL/L (ref 23–29)
CREAT SERPL-MCNC: 0.9 MG/DL (ref 0.5–1.4)
DIFFERENTIAL METHOD: ABNORMAL
EOSINOPHIL # BLD AUTO: 0 K/UL (ref 0–0.5)
EOSINOPHIL NFR BLD: 0.5 % (ref 0–8)
ERYTHROCYTE [DISTWIDTH] IN BLOOD BY AUTOMATED COUNT: 13.5 % (ref 11.5–14.5)
EST. GFR  (AFRICAN AMERICAN): >60 ML/MIN/1.73 M^2
EST. GFR  (NON AFRICAN AMERICAN): >60 ML/MIN/1.73 M^2
GLUCOSE SERPL-MCNC: 124 MG/DL (ref 70–110)
HCT VFR BLD AUTO: 34.7 % (ref 40–54)
HGB BLD-MCNC: 11.3 G/DL (ref 14–18)
IMM GRANULOCYTES # BLD AUTO: 0.09 K/UL (ref 0–0.04)
IMM GRANULOCYTES NFR BLD AUTO: 1.2 % (ref 0–0.5)
LYMPHOCYTES # BLD AUTO: 1.3 K/UL (ref 1–4.8)
LYMPHOCYTES NFR BLD: 17.5 % (ref 18–48)
MAGNESIUM SERPL-MCNC: 1.5 MG/DL (ref 1.6–2.6)
MCH RBC QN AUTO: 32.3 PG (ref 27–31)
MCHC RBC AUTO-ENTMCNC: 32.6 G/DL (ref 32–36)
MCV RBC AUTO: 99 FL (ref 82–98)
MONOCYTES # BLD AUTO: 0.8 K/UL (ref 0.3–1)
MONOCYTES NFR BLD: 10.4 % (ref 4–15)
NEUTROPHILS # BLD AUTO: 5.2 K/UL (ref 1.8–7.7)
NEUTROPHILS NFR BLD: 69.7 % (ref 38–73)
NRBC BLD-RTO: 0 /100 WBC
PHOSPHATE SERPL-MCNC: 3.3 MG/DL (ref 2.7–4.5)
PLATELET # BLD AUTO: 112 K/UL (ref 150–350)
PMV BLD AUTO: 11 FL (ref 9.2–12.9)
POCT GLUCOSE: 101 MG/DL (ref 70–110)
POCT GLUCOSE: 124 MG/DL (ref 70–110)
POCT GLUCOSE: 161 MG/DL (ref 70–110)
POTASSIUM SERPL-SCNC: 3.9 MMOL/L (ref 3.5–5.1)
PROT SERPL-MCNC: 5.2 G/DL (ref 6–8.4)
RBC # BLD AUTO: 3.5 M/UL (ref 4.6–6.2)
SODIUM SERPL-SCNC: 146 MMOL/L (ref 136–145)
SODIUM SERPL-SCNC: 147 MMOL/L (ref 136–145)
SODIUM SERPL-SCNC: 150 MMOL/L (ref 136–145)
WBC # BLD AUTO: 7.49 K/UL (ref 3.9–12.7)

## 2019-07-17 PROCEDURE — 84295 ASSAY OF SERUM SODIUM: CPT | Mod: 91

## 2019-07-17 PROCEDURE — 94761 N-INVAS EAR/PLS OXIMETRY MLT: CPT

## 2019-07-17 PROCEDURE — 99233 PR SUBSEQUENT HOSPITAL CARE,LEVL III: ICD-10-PCS | Mod: ,,, | Performed by: PSYCHIATRY & NEUROLOGY

## 2019-07-17 PROCEDURE — 83735 ASSAY OF MAGNESIUM: CPT

## 2019-07-17 PROCEDURE — 20000000 HC ICU ROOM

## 2019-07-17 PROCEDURE — 99291 PR CRITICAL CARE, E/M 30-74 MINUTES: ICD-10-PCS | Mod: ,,, | Performed by: PSYCHIATRY & NEUROLOGY

## 2019-07-17 PROCEDURE — 99291 CRITICAL CARE FIRST HOUR: CPT | Mod: ,,, | Performed by: PSYCHIATRY & NEUROLOGY

## 2019-07-17 PROCEDURE — 63600175 PHARM REV CODE 636 W HCPCS: Performed by: STUDENT IN AN ORGANIZED HEALTH CARE EDUCATION/TRAINING PROGRAM

## 2019-07-17 PROCEDURE — 99233 SBSQ HOSP IP/OBS HIGH 50: CPT | Mod: ,,, | Performed by: PSYCHIATRY & NEUROLOGY

## 2019-07-17 PROCEDURE — A4217 STERILE WATER/SALINE, 500 ML: HCPCS | Performed by: PSYCHIATRY & NEUROLOGY

## 2019-07-17 PROCEDURE — 85025 COMPLETE CBC W/AUTO DIFF WBC: CPT

## 2019-07-17 PROCEDURE — 63600175 PHARM REV CODE 636 W HCPCS: Performed by: PSYCHIATRY & NEUROLOGY

## 2019-07-17 PROCEDURE — 92507 TX SP LANG VOICE COMM INDIV: CPT

## 2019-07-17 PROCEDURE — 84295 ASSAY OF SERUM SODIUM: CPT

## 2019-07-17 PROCEDURE — 84100 ASSAY OF PHOSPHORUS: CPT

## 2019-07-17 PROCEDURE — 80053 COMPREHEN METABOLIC PANEL: CPT

## 2019-07-17 PROCEDURE — 97112 NEUROMUSCULAR REEDUCATION: CPT

## 2019-07-17 PROCEDURE — 97535 SELF CARE MNGMENT TRAINING: CPT

## 2019-07-17 PROCEDURE — 63600175 PHARM REV CODE 636 W HCPCS: Performed by: NURSE PRACTITIONER

## 2019-07-17 RX ORDER — ATROPINE SULFATE 0.1 MG/ML
1 INJECTION INTRAVENOUS
Status: DISCONTINUED | OUTPATIENT
Start: 2019-07-17 | End: 2019-07-29 | Stop reason: HOSPADM

## 2019-07-17 RX ADMIN — SODIUM CHLORIDE: 234 INJECTION INTRAMUSCULAR; INTRAVENOUS; SUBCUTANEOUS at 11:07

## 2019-07-17 RX ADMIN — HEPARIN SODIUM 5000 UNITS: 5000 INJECTION, SOLUTION INTRAVENOUS; SUBCUTANEOUS at 10:07

## 2019-07-17 RX ADMIN — HEPARIN SODIUM 5000 UNITS: 5000 INJECTION, SOLUTION INTRAVENOUS; SUBCUTANEOUS at 03:07

## 2019-07-17 RX ADMIN — ONDANSETRON 8 MG: 2 INJECTION INTRAMUSCULAR; INTRAVENOUS at 11:07

## 2019-07-17 RX ADMIN — HEPARIN SODIUM 5000 UNITS: 5000 INJECTION, SOLUTION INTRAVENOUS; SUBCUTANEOUS at 06:07

## 2019-07-17 NOTE — PT/OT/SLP PROGRESS
"I certify that I was present in the room directing the student in service delivery and guiding them using my skilled judgment. As the co-signing therapist I have reviewed the students documentation and am responsible for the treatment, assessment, and plan.   Emily P. Abadie M.S., CCC-SLP  Speech Language Pathologist  [phone]  07/19/2019    Speech Language Pathology Treatment    Patient Name:  Justyn Rawls   MRN:  [id number]  Admitting Diagnosis: Stroke due to embolism of left middle cerebral artery    Recommendations:                 General Recommendations:  Speech/language therapy  Diet recommendations:  Dental Soft, Liquid Diet Level: Thin   Aspiration Precautions: 1 bite/sip at a time, Eliminate distractions, Feed only when awake/alert, Meds whole 1 at a time and Standard aspiration precautions   General Precautions: Standard, aphasia, fall  Communication strategies:  provide increased time to answer and offer binary choice when struggling to find words     Subjective   Pt appeared frustrated when struggling to find words   SLP removed nurse YULIA arm band off pt.  Pain/Comfort:  · Pain Rating 1: 0/10  · Pain Rating Post-Intervention 1: other (see comments)(numerical value not given; head pain during session)  · Location 2: head  · Pain Addressed 2: Cessation of Activity, Nurse notified    Objective:     Has the patient been evaluated by SLP for swallowing?   Yes  Keep patient NPO? No   Current Respiratory Status: room air      Pt asleep upon arrival but easily roused. Pt less lethargic this date than previous sessions. Pt reported he tolerated chick-rashmi-a but replied "not good" when asked how hospital meals are.  Pt continues with right sided neglect though improved from previous sessions. Pt demonstrated ability to scan across midline and noted to track SLP with min verbal cues. Pt completed simple reading task at the word level with 100% accuracy independently attending to the right beyond midline. " Pt unable to complete auto speech tasks independently and warranted phonemic cues to initiate task. Pt appears to greatly benefit from phonemic cueing to assist in word retrieval. Pt unable to complete confrontation naming tasks despite SLP description of object, phonemic cues, and binary choice. Pt with obvious frustration during tasks and aware of  word finding difficulties. Pt noted to be more talkative this session with continued circumlocutory speech and semantic and phonemic paraphasias.  Pts blood pressure fluctuating in session, RN aware. Pt with decreased engagement towards end of session. SLP reviewed POC. Pt verbalized agreement and understanding. Speech to continue to follow.    Assessment:     Justyn Rawls is a 49 y.o. male with an SLP diagnosis of Aphasia, Dysarthria and Apraxia.     Goals:   Multidisciplinary Problems     SLP Goals        Problem: SLP Goal    Goal Priority Disciplines Outcome   SLP Goal     SLP Ongoing (interventions implemented as appropriate)   Description:  Speech Language Pathology Goals  Goals expected to be met by 7/21    1. Pt will tolerate diet of thin liquids and dental soft solids without overt clinical signs of aspiration   2. Pt will participate in ongoing assessment of speech language and cognitive linguistic skills to help rule out deficits and determine therapeutic plan of care   Updated additional goals:   1. Pt will participate in simple visual scanning tasks with 80% accuracy and mod cues.  2. Pt will complete OMEs x10 with SLP and independently   3. Pt will complete orientation tasks with 80% accuracy and mod cues.  4. Pt will complete simple naming tasks with 50% accuracy and mod cues.   5. Pt will follow simple commands with 50% accuracy and mod cues.  6. Pt will complete automatic speech tasks with 80% accuracy and mod cues.  7. Pt will answer Y/N questions with 60% accuracy and mod cues.   8. Pt will participate in ongoing assessment of reading and writing  skills.                        Plan:     · Patient to be seen:  4 x/week   · Plan of Care expires:     · Plan of Care reviewed with:  patient   · SLP Follow-Up:  Yes       Discharge recommendations:  rehabilitation facility   Barriers to Discharge:  Level of Skilled Assistance Needed and  inability to functionally communicate wants and needs     Time Tracking:     SLP Treatment Date:   07/17/19  Speech Start Time:  1028  Speech Stop Time:  1038     Speech Total Time (min):  10 min    Billable Minutes: Speech Therapy Individual 10    ALEX Conte  07/17/2019

## 2019-07-17 NOTE — SUBJECTIVE & OBJECTIVE
Interval History: Consulted with Cardiology and decided that a ABDIAS would not be absolutely medical necessary before the MRI due to anesthesia risks. Patient taken for MRI without contrast today.  Cardiology spoke to rep from manufactor of [employer] device to ensure safety within the MRI machine.  Patient has been kept NPO since AM.      Review of Systems: Unable to obtain a complete ROS due to level of consciousness.     Vitals:   Temp: 98.9 °F (37.2 °C)  Pulse: (!) 43  Rhythm: normal sinus rhythm  BP: (!) 139/94  MAP (mmHg): 112  Resp: (!) 26  SpO2: 97 %  O2 Device (Oxygen Therapy): room air    Temp  Min: 98.9 °F (37.2 °C)  Max: 99.6 °F (37.6 °C)  Pulse  Min: 38  Max: 80  BP  Min: 121/71  Max: 158/83  MAP (mmHg)  Min: 86  Max: 115  Resp  Min: 12  Max: 39  SpO2  Min: 94 %  Max: 100 %    07/16 0701 - 07/17 0700  In: 3475 [P.O.:600; I.V.:2875]  Out: 1000 [Urine:1000]   Unmeasured Output  Urine Occurrence: 1  Stool Occurrence: 1  Emesis Occurrence: 1     Examination:   Constitutional: Well-nourished and -developed. No apparent distress.   Eyes: Conjunctiva clear, anicteric. Lids no lesions.  Head/Ears/Nose/Mouth/Throat/Neck: Moist mucous membranes. External ears, nose atraumatic.   Cardiovascular: Regular rhythm. No murmurs. No leg edema.  Respiratory: Comfortable respirations. Clear to auscultation.  Gastrointestinal: No hernia. Soft, nondistended, nontender. + bowel sounds.    Neurologic:  -GCS E3V3M6  -Drowsy at baseline. Oriented to person and place. Expressive aphasia. Follows commands.  -Cranial nerves II-XII grossly intact.  Facial droop on the right side  - No spontaneous movement of RUE noted; strength 0/5.  Distal RLE lack of spontaneous movement below the knee; strength (0/5). RLE above the knee exhibits full strength 5/5/.  Decreased light tough sensation noted in RUE and RLE. Sensation to pin prick intact in RUE and RLE.  LUE and LLE exhibit full strength 5/5 and sensation.    Medications:    Continuous  niCARdipine Last Rate: Stopped (07/14/19 0330)   Sodium Chloride 2% Last Rate: 100 mL/hr at 07/17/19 1200   Scheduled  atorvastatin 40 mg Daily   folic acid 1 mg Daily   heparin (porcine) 5,000 Units Q8H   senna-docusate 8.6-50 mg 1 tablet BID   thiamine 100 mg Daily   PRN  acetaminophen 650 mg Q6H PRN   magnesium oxide 800 mg PRN   magnesium oxide 800 mg PRN   ondansetron 8 mg Q8H PRN   potassium chloride 10% 40 mEq PRN   potassium chloride 10% 40 mEq PRN   potassium chloride 10% 40 mEq PRN   potassium, sodium phosphates 2 packet PRN   potassium, sodium phosphates 2 packet PRN   potassium, sodium phosphates 2 packet PRN   sodium chloride 0.9% 10 mL PRN      Today I independently reviewed pertinent medications, lines/drains/airways, imaging,      ISTAT: No results for input(s): PH, PCO2, PO2, POCSATURATED, HCO3, BE, POCNA, POCK, POCTCO2, POCGLU, POCICA, POCLAC, SAMPLE in the last 24 hours.   Chem: Recent Labs   Lab 07/17/19  0558 07/17/19  1154   *  147* 147*   K 3.9  --    *  --    CO2 22*  --    *  --    BUN 5*  --    CREATININE 0.9  --    ESTGFRAFRICA >60.0  --    EGFRNONAA >60.0  --    CALCIUM 8.0*  --    MG 1.5*  --    PHOS 3.3  --    ANIONGAP 6*  --    PROT 5.2*  --    ALBUMIN 2.6*  --    BILITOT 0.4  --    ALKPHOS 42*  --    AST 21  --    ALT 14  --      Heme: Recent Labs   Lab 07/17/19  0558   WBC 7.49   HGB 11.3*   HCT 34.7*   *     Endo:   Recent Labs   Lab 07/16/19  1759 07/17/19  1207   POCTGLUCOSE 128* 124*        Diet  Diet NPO  Diet NPO

## 2019-07-17 NOTE — ASSESSMENT & PLAN NOTE
- s/p tPA over 24 hours ago  - CTA showed prox L MCA occlusion s/p thrombectomy w/ TICI2c flows post intervention  - continue 2% hypertonic saline for edema  - MRI preformed today, awaiting results.   - vasc neurology following, appreciate recs  - HOB flat post procedure  - SBP <160  - q1 neurochecks  - hold anticoagulation  - echo preformed on 7/14: EF 60% with normal LV systolic/ diastolic function and normal RV systolic function with mild tricuspid regurgitation.  - Per speech eval, placed on dysphagia diet. Appreciate further recs.  - PT/OT/ST

## 2019-07-17 NOTE — PLAN OF CARE
Problem: Occupational Therapy Goal  Goal: Occupational Therapy Goal  Goals set 7/15 to be met by 7/29   Patient will increase functional independence with ADLs by performing:    UE Dressing with Minimal Assistance with hemiplegic technique.  LE Dressing with Minimal Assistance.  Grooming while standing with Contact Guard Assistance.   Toileting from bedside commode with Minimal Assistance for hygiene and clothing management.   Rolling to Left with Contact Guard Assistance.   Rolling to Right with Supervision.   Stand pivot transfers with Contact Guard Assistance.  Patient/Caregivers will be independent in assisting in bed mobility/positioning  Patient/Caregivers will be independent in HEP for weightbearing with RUE, PROM of RUE.       Goals remain appropriate.  Yesenia Hernandez, LOTR  07/17/2019

## 2019-07-17 NOTE — PROGRESS NOTES
ABDIAS order received. Schedule for 7/17 is full. Patient considered add on and if another case already on schedule cancels today then will be able to fit in patient. Otherwise patient will be placed on 7/18 schedule. Keep patient NPO in case of cancellation today. Patient needs to be NPO x 8 hours. Call ABDIAS RN at [phone] with any questions.

## 2019-07-17 NOTE — PLAN OF CARE
Problem: SLP Goal  Goal: SLP Goal  Speech Language Pathology Goals  Goals expected to be met by 7/21    1. Pt will tolerate diet of thin liquids and dental soft solids without overt clinical signs of aspiration   2. Pt will participate in ongoing assessment of speech language and cognitive linguistic skills to help rule out deficits and determine therapeutic plan of care   Updated additional goals:   1. Pt will participate in simple visual scanning tasks with 80% accuracy and mod cues.  2. Pt will complete OMEs x10 with SLP and independently   3. Pt will complete orientation tasks with 80% accuracy and mod cues.  4. Pt will complete simple naming tasks with 50% accuracy and mod cues.   5. Pt will follow simple commands with 50% accuracy and mod cues.  6. Pt will complete automatic speech tasks with 80% accuracy and mod cues.  7. Pt will answer Y/N questions with 60% accuracy and mod cues.   8. Pt will participate in ongoing assessment of reading and writing skills.       Outcome: Ongoing (interventions implemented as appropriate)  Pt seen bedside with no family present. Reported to eat chick-rashmi-a with no issues. Rec cont dental soft/thin diet and re-check next session. Cessation of cessation 2/2 pt head pain and BP levels fluctuating: nurse notified. Speech to continue to follow.   Opal Thompson, ALEX  7/17/19
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
## 2019-07-17 NOTE — PROGRESS NOTES
[facility]  Vascular Neurology  Comprehensive Stroke Center  Progress Note    Assessment/Plan:     * Stroke due to embolism of left middle cerebral artery  50 y/o male with L MCA stroke due to M1 occlusion received IV TPA and thrombectomy to TICI 2C, probable due to afib    Antithrombotics: Need anticoagulation after TPA window    Statins: Lipitor 40 mg daily    Aggressive risk factor modification: A-Fib, alcohol     Rehab efforts: The patient has been evaluated by a stroke team provider and the therapy needs have been fully considered based off the presenting complaints and exam findings. The following therapy evaluations are needed: PT evaluate and treat, OT evaluate and treat, SLP evaluate and treat, PM&R evaluate for appropriate placement    Diagnostics ordered/pending: HgbA1C to assess blood glucose levels, Lipid Profile to assess cholesterol levels, MRI head without contrast to assess brain parenchyma, TTE to assess cardiac function/status     VTE prophylaxis: SCD's raquel begin Heparin 5000 units sc Q8H on 7-15-10 at 0600    BP parameters: Infarct: Post sucessful thrombectomy, SBP <140        Aphasia  Due to stroke  Aggressive therapy    Hemiparesis, right  Due to stroke  Aggressive therapy    S/P admn tPA in diff fac w/n last 24 hr bef adm to crnt fac  Close monitoring in NCC 24 hours post administration         PAF (paroxysmal atrial fibrillation)  Stroke risk factor  Rate control  CHADVASC score: 2  HAS BLED score: 2  Needs anticoagulation will wait for MRI brain results         Patient was admitted on 07/14 with L MCA ischemic stroke resulted in right side weakness, aphasia, facial drop and LT gaze prefrance. NIH on admission 13. He is S/P tpa and thrombectomy     STROKE DOCUMENTATION   Acute Stroke Times   Last Known Normal Date: 07/13/19  Last Known Normal Time: 2130  Symptom Onset Date: 07/13/19  Symptom Onset Time: 2130  Stroke Team Called Date: 07/13/19  Stroke Team Called Time:  2245  Stroke Team Arrival Date: 07/14/19  Stroke Team Arrival Time: 0107  CT Interpretation Time: 2251  Decision to Treat Time for Alteplase: 2307(bolus given)  Decision to Treat Time for IR: 0107    NIH Scale:  1a. Level of Consciousness: 0-->Alert, keenly responsive  1b. LOC Questions: 1-->Answers one question correctly  1c. LOC Commands: 0-->Performs both tasks correctly  2. Best Gaze: 0-->Normal  3. Visual: 1-->Partial hemianopia  4. Facial Palsy: 2-->Partial paralysis (total or near-total paralysis of lower face)  5a. Motor Arm, Left: 0-->No drift, limb holds 90 (or 45) degrees for full 10 secs  5b. Motor Arm, Right: 3-->No effort against gravity, limb falls  6a. Motor Leg, Left: 0-->No drift, leg holds 30 degree position for full 5 secs  6b. Motor Leg, Right: 2-->Some effort against gravity, leg falls to bed by 5 secs, but has some effort against gravity  7. Limb Ataxia: 0-->Absent  8. Sensory: 0-->Normal, no sensory loss  9. Best Language: 1-->Mild-to-moderate aphasia, some obvious loss of fluency or facility of comprehension, without significant limitation on ideas expressed or form of expression. Reduction of speech and/or comprehension, however, makes conversation. . . (see row details)  10. Dysarthria: 1-->Mild-to-moderate dysarthria, patient slurs at least some words and, at worst, can be understood with some difficulty  11. Extinction and Inattention (formerly Neglect): 0-->No abnormality  Total (NIH Stroke Scale): 11       Modified Kerry Score: 0  Brandin Coma Scale:14   ABCD2 Score:    [id number] Score:2  HAS -BLED Score:2  ICH Score:   Hunt & Ta Classification:          Interval History: Patient for brain MRI today      Vitals:   Temp: 98.9 °F (37.2 °C)  Pulse: (!) 43  Rhythm: normal sinus rhythm  BP: (!) 139/94  MAP (mmHg): 112  Resp: (!) 26  SpO2: 97 %  O2 Device (Oxygen Therapy): room air    Temp  Min: 98.9 °F (37.2 °C)  Max: 99.6 °F (37.6 °C)  Pulse  Min: 38  Max: 80  BP  Min: 121/71  Max:  158/83  MAP (mmHg)  Min: 86  Max: 115  Resp  Min: 12  Max: 39  SpO2  Min: 94 %  Max: 100 %    07/16 0701 - 07/17 0700  In: 3475 [P.O.:600; I.V.:2875]  Out: 1000 [Urine:1000]   Unmeasured Output  Urine Occurrence: 1  Stool Occurrence: 1  Emesis Occurrence: 1     Examination:   Constitutional: Well-nourished and -developed. No apparent distress.   Eyes: Conjunctiva clear, anicteric. Lids no lesions.  Head/Ears/Nose/Mouth/Throat/Neck: Moist mucous membranes. External ears, nose atraumatic.   Cardiovascular: Regular rhythm. No murmurs. No leg edema.  Respiratory: Comfortable respirations. Clear to auscultation.  Gastrointestinal: No hernia. Soft, nondistended, nontender. + bowel sounds.    Neurologic:  -GCS E3V3M6  -Drowsy at baseline. Oriented to person and place. Expressive aphasia. Follows commands.  -Cranial nerves II-XII grossly intact.  Facial droop on the right side  - No spontaneous movement of RUE noted; strength 0/5.  Distal RLE lack of spontaneous movement below the knee; strength (0/5). RLE above the knee exhibits full strength 5/5/.  Decreased light tough sensation noted in RUE and RLE. Sensation to pin prick intact in RUE and RLE.  LUE and LLE exhibit full strength 5/5 and sensation.    Medications:   Continuous    niCARdipine Last Rate: Stopped (07/14/19 0330)   Sodium Chloride 2% Last Rate: 100 mL/hr at 07/17/19 1500   Scheduled    atorvastatin 40 mg Daily   folic acid 1 mg Daily   heparin (porcine) 5,000 Units Q8H   senna-docusate 8.6-50 mg 1 tablet BID   thiamine 100 mg Daily   PRN    acetaminophen 650 mg Q6H PRN   magnesium oxide 800 mg PRN   magnesium oxide 800 mg PRN   ondansetron 8 mg Q8H PRN   potassium chloride 10% 40 mEq PRN   potassium chloride 10% 40 mEq PRN   potassium chloride 10% 40 mEq PRN   potassium, sodium phosphates 2 packet PRN   potassium, sodium phosphates 2 packet PRN   potassium, sodium phosphates 2 packet PRN   sodium chloride 0.9% 10 mL PRN      Today I independently reviewed  pertinent medications, lines/drains/airways, imaging,      ISTAT: No results for input(s): PH, PCO2, PO2, POCSATURATED, HCO3, BE, POCNA, POCK, POCTCO2, POCGLU, POCICA, POCLAC, SAMPLE in the last 24 hours.   Chem:   Recent Labs   Lab 07/17/19  0558 07/17/19  1154   *  147* 147*   K 3.9  --    *  --    CO2 22*  --    *  --    BUN 5*  --    CREATININE 0.9  --    ESTGFRAFRICA >60.0  --    EGFRNONAA >60.0  --    CALCIUM 8.0*  --    MG 1.5*  --    PHOS 3.3  --    ANIONGAP 6*  --    PROT 5.2*  --    ALBUMIN 2.6*  --    BILITOT 0.4  --    ALKPHOS 42*  --    AST 21  --    ALT 14  --      Heme:   Recent Labs   Lab 07/17/19  0558   WBC 7.49   HGB 11.3*   HCT 34.7*   *     Endo:   Recent Labs   Lab 07/16/19  1759 07/17/19  1207   POCTGLUCOSE 128* 124*        Diet  Diet NPO  Diet NPO      Subjective:         HPI, Past Medical, Family, and Social History remains the same as documented in the initial encounter.     Review of Systems  Scheduled Meds:   atorvastatin  40 mg Oral Daily    folic acid  1 mg Oral Daily    heparin (porcine)  5,000 Units Subcutaneous Q8H    senna-docusate 8.6-50 mg  1 tablet Oral BID    thiamine  100 mg Oral Daily     Continuous Infusions:   niCARdipine Stopped (07/14/19 0330)    Sodium Chloride 2% 100 mL/hr at 07/17/19 1500     PRN Meds:acetaminophen, magnesium oxide, magnesium oxide, ondansetron, potassium chloride 10%, potassium chloride 10%, potassium chloride 10%, potassium, sodium phosphates, potassium, sodium phosphates, potassium, sodium phosphates, sodium chloride 0.9%    Objective:     Vital Signs (Most Recent):  Temp: 98.9 °F (37.2 °C) (07/17/19 1100)  Pulse: (!) 43 (07/17/19 1400)  Resp: (!) 26 (07/17/19 1400)  BP: (!) 139/94 (07/17/19 1400)  SpO2: 97 % (07/17/19 1400)  BP Location: Right arm    Vital Signs Range (Last 24H):  Temp:  [98.9 °F (37.2 °C)-99.6 °F (37.6 °C)]   Pulse:  [38-80]   Resp:  [12-39]   BP: (121-158)/(62-94)   SpO2:  [94 %-100 %]   Arterial  Line BP: ()/(63-90)   BP Location: Right arm    Examination:   Constitutional: Well-nourished and -developed. No apparent distress.   Eyes: Conjunctiva clear, anicteric. Lids no lesions.  Head/Ears/Nose/Mouth/Throat/Neck: Moist mucous membranes. External ears, nose atraumatic.   Cardiovascular: Regular rhythm. No murmurs. No leg edema.  Respiratory: Comfortable respirations. Clear to auscultation.  Gastrointestinal: No hernia. Soft, nondistended, nontender. + bowel sounds.    Neurologic:  -GCS E3V3M6  -Drowsy at baseline. Oriented to person and place. Expressive aphasia. Follows commands.  -Cranial nerves II-XII grossly intact.  Facial droop on the right side  - No spontaneous movement of RUE noted; strength 0/5.  Distal RLE lack of spontaneous movement below the knee; strength (0/5). RLE above the knee exhibits full strength 5/5/.  Decreased light tough sensation noted in RUE and RLE. Sensation to pin prick intact in RUE and RLE.  LUE and LLE exhibit full strength 5/5 and    Laboratory:  CMP:   Recent Labs   Lab 07/17/19  0558 07/17/19  1154   CALCIUM 8.0*  --    ALBUMIN 2.6*  --    PROT 5.2*  --    *  147* 147*   K 3.9  --    CO2 22*  --    *  --    BUN 5*  --    CREATININE 0.9  --    ALKPHOS 42*  --    ALT 14  --    AST 21  --    BILITOT 0.4  --      BMP:   Recent Labs   Lab 07/17/19  0558 07/17/19  1154   *  147* 147*   K 3.9  --    *  --    CO2 22*  --    BUN 5*  --    CREATININE 0.9  --    CALCIUM 8.0*  --      CBC:   Recent Labs   Lab 07/17/19  0558   WBC 7.49   RBC 3.50*   HGB 11.3*   HCT 34.7*   *   MCV 99*   MCH 32.3*   MCHC 32.6     Lipid Panel:   Recent Labs   Lab 07/14/19  0456   CHOL 159   LDLCALC 91.6   HDL 58   TRIG 47     Coagulation:   Recent Labs   Lab 07/15/19  1005   INR 1.1   APTT 22.9     Platelet Aggregation Study: No results for input(s): PLTAGG, PLTAGINTERP, PLTAGREGLACO, ADPPLTAGGREG in the last 168 hours.  Hgb A1C:   Recent Labs   Lab 07/14/19  0300    HGBA1C 5.1     TSH:   Recent Labs   Lab 07/14/19  0300   TSH 0.527           Zainab Caceres MD  Comprehensive Stroke Center  Department of Vascular Neurology   [facility]

## 2019-07-17 NOTE — PLAN OF CARE
Problem: Adult Inpatient Plan of Care  Goal: Plan of Care Review  Outcome: Ongoing (interventions implemented as appropriate)  Plan of care reviewed with pt and spouse.All questions and concerns addressed.pt having bradycardiac episodes.VSS.No acute distress noted.MRI done

## 2019-07-17 NOTE — PRE ADMISSION SCREENING
[facility] ip [facility]ha is following, no beds until end of week. Will follow daily and place note note. If I need to be reached my cell is [phone].
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
## 2019-07-17 NOTE — SUBJECTIVE & OBJECTIVE
Interval History: Patient for brain MRI today      Vitals:   Temp: 98.9 °F (37.2 °C)  Pulse: (!) 43  Rhythm: normal sinus rhythm  BP: (!) 139/94  MAP (mmHg): 112  Resp: (!) 26  SpO2: 97 %  O2 Device (Oxygen Therapy): room air    Temp  Min: 98.9 °F (37.2 °C)  Max: 99.6 °F (37.6 °C)  Pulse  Min: 38  Max: 80  BP  Min: 121/71  Max: 158/83  MAP (mmHg)  Min: 86  Max: 115  Resp  Min: 12  Max: 39  SpO2  Min: 94 %  Max: 100 %    07/16 0701 - 07/17 0700  In: 3475 [P.O.:600; I.V.:2875]  Out: 1000 [Urine:1000]   Unmeasured Output  Urine Occurrence: 1  Stool Occurrence: 1  Emesis Occurrence: 1     Examination:   Constitutional: Well-nourished and -developed. No apparent distress.   Eyes: Conjunctiva clear, anicteric. Lids no lesions.  Head/Ears/Nose/Mouth/Throat/Neck: Moist mucous membranes. External ears, nose atraumatic.   Cardiovascular: Regular rhythm. No murmurs. No leg edema.  Respiratory: Comfortable respirations. Clear to auscultation.  Gastrointestinal: No hernia. Soft, nondistended, nontender. + bowel sounds.    Neurologic:  -GCS E3V3M6  -Drowsy at baseline. Oriented to person and place. Expressive aphasia. Follows commands.  -Cranial nerves II-XII grossly intact.  Facial droop on the right side  - No spontaneous movement of RUE noted; strength 0/5.  Distal RLE lack of spontaneous movement below the knee; strength (0/5). RLE above the knee exhibits full strength 5/5/.  Decreased light tough sensation noted in RUE and RLE. Sensation to pin prick intact in RUE and RLE.  LUE and LLE exhibit full strength 5/5 and sensation.    Medications:   Continuous    niCARdipine Last Rate: Stopped (07/14/19 0330)   Sodium Chloride 2% Last Rate: 100 mL/hr at 07/17/19 1500   Scheduled    atorvastatin 40 mg Daily   folic acid 1 mg Daily   heparin (porcine) 5,000 Units Q8H   senna-docusate 8.6-50 mg 1 tablet BID   thiamine 100 mg Daily   PRN    acetaminophen 650 mg Q6H PRN   magnesium oxide 800 mg PRN   magnesium oxide 800 mg PRN    ondansetron 8 mg Q8H PRN   potassium chloride 10% 40 mEq PRN   potassium chloride 10% 40 mEq PRN   potassium chloride 10% 40 mEq PRN   potassium, sodium phosphates 2 packet PRN   potassium, sodium phosphates 2 packet PRN   potassium, sodium phosphates 2 packet PRN   sodium chloride 0.9% 10 mL PRN      Today I independently reviewed pertinent medications, lines/drains/airways, imaging,      ISTAT: No results for input(s): PH, PCO2, PO2, POCSATURATED, HCO3, BE, POCNA, POCK, POCTCO2, POCGLU, POCICA, POCLAC, SAMPLE in the last 24 hours.   Chem:   Recent Labs   Lab 07/17/19  0558 07/17/19  1154   *  147* 147*   K 3.9  --    *  --    CO2 22*  --    *  --    BUN 5*  --    CREATININE 0.9  --    ESTGFRAFRICA >60.0  --    EGFRNONAA >60.0  --    CALCIUM 8.0*  --    MG 1.5*  --    PHOS 3.3  --    ANIONGAP 6*  --    PROT 5.2*  --    ALBUMIN 2.6*  --    BILITOT 0.4  --    ALKPHOS 42*  --    AST 21  --    ALT 14  --      Heme:   Recent Labs   Lab 07/17/19  0558   WBC 7.49   HGB 11.3*   HCT 34.7*   *     Endo:   Recent Labs   Lab 07/16/19  1759 07/17/19  1207   POCTGLUCOSE 128* 124*        Diet  Diet NPO  Diet NPO      Subjective:         HPI, Past Medical, Family, and Social History remains the same as documented in the initial encounter.     Review of Systems  Scheduled Meds:   atorvastatin  40 mg Oral Daily    folic acid  1 mg Oral Daily    heparin (porcine)  5,000 Units Subcutaneous Q8H    senna-docusate 8.6-50 mg  1 tablet Oral BID    thiamine  100 mg Oral Daily     Continuous Infusions:   niCARdipine Stopped (07/14/19 0330)    Sodium Chloride 2% 100 mL/hr at 07/17/19 1500     PRN Meds:acetaminophen, magnesium oxide, magnesium oxide, ondansetron, potassium chloride 10%, potassium chloride 10%, potassium chloride 10%, potassium, sodium phosphates, potassium, sodium phosphates, potassium, sodium phosphates, sodium chloride 0.9%    Objective:     Vital Signs (Most Recent):  Temp: 98.9 °F (37.2  °C) (07/17/19 1100)  Pulse: (!) 43 (07/17/19 1400)  Resp: (!) 26 (07/17/19 1400)  BP: (!) 139/94 (07/17/19 1400)  SpO2: 97 % (07/17/19 1400)  BP Location: Right arm    Vital Signs Range (Last 24H):  Temp:  [98.9 °F (37.2 °C)-99.6 °F (37.6 °C)]   Pulse:  [38-80]   Resp:  [12-39]   BP: (121-158)/(62-94)   SpO2:  [94 %-100 %]   Arterial Line BP: ()/(63-90)   BP Location: Right arm    Examination:   Constitutional: Well-nourished and -developed. No apparent distress.   Eyes: Conjunctiva clear, anicteric. Lids no lesions.  Head/Ears/Nose/Mouth/Throat/Neck: Moist mucous membranes. External ears, nose atraumatic.   Cardiovascular: Regular rhythm. No murmurs. No leg edema.  Respiratory: Comfortable respirations. Clear to auscultation.  Gastrointestinal: No hernia. Soft, nondistended, nontender. + bowel sounds.    Neurologic:  -GCS E3V3M6  -Drowsy at baseline. Oriented to person and place. Expressive aphasia. Follows commands.  -Cranial nerves II-XII grossly intact.  Facial droop on the right side  - No spontaneous movement of RUE noted; strength 0/5.  Distal RLE lack of spontaneous movement below the knee; strength (0/5). RLE above the knee exhibits full strength 5/5/.  Decreased light tough sensation noted in RUE and RLE. Sensation to pin prick intact in RUE and RLE.  LUE and LLE exhibit full strength 5/5 and    Laboratory:  CMP:   Recent Labs   Lab 07/17/19  0558 07/17/19  1154   CALCIUM 8.0*  --    ALBUMIN 2.6*  --    PROT 5.2*  --    *  147* 147*   K 3.9  --    CO2 22*  --    *  --    BUN 5*  --    CREATININE 0.9  --    ALKPHOS 42*  --    ALT 14  --    AST 21  --    BILITOT 0.4  --      BMP:   Recent Labs   Lab 07/17/19  0558 07/17/19  1154   *  147* 147*   K 3.9  --    *  --    CO2 22*  --    BUN 5*  --    CREATININE 0.9  --    CALCIUM 8.0*  --      CBC:   Recent Labs   Lab 07/17/19  0558   WBC 7.49   RBC 3.50*   HGB 11.3*   HCT 34.7*   *   MCV 99*   MCH 32.3*   MCHC 32.6      Lipid Panel:   Recent Labs   Lab 07/14/19  0456   CHOL 159   LDLCALC 91.6   HDL 58   TRIG 47     Coagulation:   Recent Labs   Lab 07/15/19  1005   INR 1.1   APTT 22.9     Platelet Aggregation Study: No results for input(s): PLTAGG, PLTAGINTERP, PLTAGREGLACO, ADPPLTAGGREG in the last 168 hours.  Hgb A1C:   Recent Labs   Lab 07/14/19  0300   HGBA1C 5.1     TSH:   Recent Labs   Lab 07/14/19  0300   TSH 0.527

## 2019-07-17 NOTE — HPI
Pt is 49yoM w/ PMH L sided occipital and temporal CVA (2014), a fib s/p Watchman device (2017), and alcohol abuse who presented w/ stroke d/t L MCA embolism s/p tPA. MRI showed infarct in L MCA, superimposed petechial hemorrhage in L basal ganglia, insula, frontal, and parietal lobes, and localized mass effect. TTE on 7/14 showed EF 60% w/ normal systolic and diastolic function.

## 2019-07-18 PROBLEM — D69.6 THROMBOCYTOPENIA: Status: ACTIVE | Noted: 2019-07-18

## 2019-07-18 PROBLEM — D75.839 THROMBOCYTOSIS: Status: ACTIVE | Noted: 2019-07-18

## 2019-07-18 LAB
ALBUMIN SERPL BCP-MCNC: 2.5 G/DL (ref 3.5–5.2)
ALP SERPL-CCNC: 44 U/L (ref 55–135)
ALT SERPL W/O P-5'-P-CCNC: 13 U/L (ref 10–44)
ANION GAP SERPL CALC-SCNC: 8 MMOL/L (ref 8–16)
AST SERPL-CCNC: 14 U/L (ref 10–40)
BASOPHILS # BLD AUTO: 0.04 K/UL (ref 0–0.2)
BASOPHILS NFR BLD: 0.5 % (ref 0–1.9)
BILIRUB SERPL-MCNC: 0.3 MG/DL (ref 0.1–1)
BUN SERPL-MCNC: 5 MG/DL (ref 6–20)
CALCIUM SERPL-MCNC: 7.5 MG/DL (ref 8.7–10.5)
CHLORIDE SERPL-SCNC: 117 MMOL/L (ref 95–110)
CO2 SERPL-SCNC: 21 MMOL/L (ref 23–29)
CREAT SERPL-MCNC: 0.8 MG/DL (ref 0.5–1.4)
DIFFERENTIAL METHOD: ABNORMAL
EOSINOPHIL # BLD AUTO: 0 K/UL (ref 0–0.5)
EOSINOPHIL NFR BLD: 0.4 % (ref 0–8)
ERYTHROCYTE [DISTWIDTH] IN BLOOD BY AUTOMATED COUNT: 13.6 % (ref 11.5–14.5)
EST. GFR  (AFRICAN AMERICAN): >60 ML/MIN/1.73 M^2
EST. GFR  (NON AFRICAN AMERICAN): >60 ML/MIN/1.73 M^2
GLUCOSE SERPL-MCNC: 122 MG/DL (ref 70–110)
HCT VFR BLD AUTO: 33.7 % (ref 40–54)
HGB BLD-MCNC: 10.8 G/DL (ref 14–18)
IMM GRANULOCYTES # BLD AUTO: 0.07 K/UL (ref 0–0.04)
IMM GRANULOCYTES NFR BLD AUTO: 0.9 % (ref 0–0.5)
LYMPHOCYTES # BLD AUTO: 1.2 K/UL (ref 1–4.8)
LYMPHOCYTES NFR BLD: 14.9 % (ref 18–48)
MAGNESIUM SERPL-MCNC: 1.4 MG/DL (ref 1.6–2.6)
MCH RBC QN AUTO: 32.6 PG (ref 27–31)
MCHC RBC AUTO-ENTMCNC: 32 G/DL (ref 32–36)
MCV RBC AUTO: 102 FL (ref 82–98)
MONOCYTES # BLD AUTO: 0.9 K/UL (ref 0.3–1)
MONOCYTES NFR BLD: 11 % (ref 4–15)
NEUTROPHILS # BLD AUTO: 5.8 K/UL (ref 1.8–7.7)
NEUTROPHILS NFR BLD: 72.3 % (ref 38–73)
NRBC BLD-RTO: 0 /100 WBC
PHOSPHATE SERPL-MCNC: 3.1 MG/DL (ref 2.7–4.5)
PLATELET # BLD AUTO: 91 K/UL (ref 150–350)
PMV BLD AUTO: 11.1 FL (ref 9.2–12.9)
POCT GLUCOSE: 154 MG/DL (ref 70–110)
POCT GLUCOSE: 75 MG/DL (ref 70–110)
POCT GLUCOSE: 79 MG/DL (ref 70–110)
POTASSIUM SERPL-SCNC: 3.5 MMOL/L (ref 3.5–5.1)
PROT SERPL-MCNC: 4.8 G/DL (ref 6–8.4)
RBC # BLD AUTO: 3.31 M/UL (ref 4.6–6.2)
SODIUM SERPL-SCNC: 144 MMOL/L (ref 136–145)
SODIUM SERPL-SCNC: 145 MMOL/L (ref 136–145)
SODIUM SERPL-SCNC: 146 MMOL/L (ref 136–145)
SODIUM SERPL-SCNC: 146 MMOL/L (ref 136–145)
SODIUM SERPL-SCNC: 147 MMOL/L (ref 136–145)
WBC # BLD AUTO: 7.94 K/UL (ref 3.9–12.7)

## 2019-07-18 PROCEDURE — 99233 PR SUBSEQUENT HOSPITAL CARE,LEVL III: ICD-10-PCS | Mod: ,,, | Performed by: PSYCHIATRY & NEUROLOGY

## 2019-07-18 PROCEDURE — 84295 ASSAY OF SERUM SODIUM: CPT | Mod: 91

## 2019-07-18 PROCEDURE — 99233 SBSQ HOSP IP/OBS HIGH 50: CPT | Mod: ,,, | Performed by: PSYCHIATRY & NEUROLOGY

## 2019-07-18 PROCEDURE — 99233 SBSQ HOSP IP/OBS HIGH 50: CPT | Mod: ,,, | Performed by: INTERNAL MEDICINE

## 2019-07-18 PROCEDURE — A4217 STERILE WATER/SALINE, 500 ML: HCPCS | Performed by: PSYCHIATRY & NEUROLOGY

## 2019-07-18 PROCEDURE — 80053 COMPREHEN METABOLIC PANEL: CPT

## 2019-07-18 PROCEDURE — 63600175 PHARM REV CODE 636 W HCPCS: Performed by: PSYCHIATRY & NEUROLOGY

## 2019-07-18 PROCEDURE — 25000003 PHARM REV CODE 250: Performed by: NURSE PRACTITIONER

## 2019-07-18 PROCEDURE — 83735 ASSAY OF MAGNESIUM: CPT

## 2019-07-18 PROCEDURE — 63600175 PHARM REV CODE 636 W HCPCS: Performed by: STUDENT IN AN ORGANIZED HEALTH CARE EDUCATION/TRAINING PROGRAM

## 2019-07-18 PROCEDURE — 85025 COMPLETE CBC W/AUTO DIFF WBC: CPT

## 2019-07-18 PROCEDURE — 20000000 HC ICU ROOM

## 2019-07-18 PROCEDURE — 84100 ASSAY OF PHOSPHORUS: CPT

## 2019-07-18 PROCEDURE — 99233 PR SUBSEQUENT HOSPITAL CARE,LEVL III: ICD-10-PCS | Mod: ,,, | Performed by: INTERNAL MEDICINE

## 2019-07-18 RX ADMIN — FOLIC ACID 1 MG: 1 TABLET ORAL at 12:07

## 2019-07-18 RX ADMIN — HEPARIN SODIUM 5000 UNITS: 5000 INJECTION, SOLUTION INTRAVENOUS; SUBCUTANEOUS at 09:07

## 2019-07-18 RX ADMIN — HEPARIN SODIUM 5000 UNITS: 5000 INJECTION, SOLUTION INTRAVENOUS; SUBCUTANEOUS at 06:07

## 2019-07-18 RX ADMIN — SODIUM CHLORIDE: 234 INJECTION INTRAMUSCULAR; INTRAVENOUS; SUBCUTANEOUS at 12:07

## 2019-07-18 RX ADMIN — HEPARIN SODIUM 5000 UNITS: 5000 INJECTION, SOLUTION INTRAVENOUS; SUBCUTANEOUS at 02:07

## 2019-07-18 RX ADMIN — SODIUM CHLORIDE: 234 INJECTION INTRAMUSCULAR; INTRAVENOUS; SUBCUTANEOUS at 06:07

## 2019-07-18 RX ADMIN — Medication 100 MG: at 12:07

## 2019-07-18 RX ADMIN — ATORVASTATIN CALCIUM 40 MG: 20 TABLET, FILM COATED ORAL at 12:07

## 2019-07-18 NOTE — ASSESSMENT & PLAN NOTE
Pt is 50yo M w/ PMH afib s/p watchman in 2017 who presented w/ stroke d/t L MCA embolism s/p tPA.     Plan:  - Carotid U/S to evaluate for possible source of emboli  - ABDIAS with bubble study to assess watchman placement, evaluate for thrombus and evaluate for patent ASD  - Continue holding anticoagulation  - Suggest antiplatelet therapy per guidelines (ASA monotherapy since pt had major stroke)  - NPO at midnight
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
## 2019-07-18 NOTE — PRE ADMISSION SCREENING
[facility] iprehab following daily. Will admit to iprehab here when INS and MD's clear. Will reach out to spouse today.

## 2019-07-18 NOTE — PLAN OF CARE
07/18/19 1254   Discharge Reassessment   Assessment Type Discharge Planning Reassessment   Provided patient/caregiver education on the expected discharge date and the discharge plan Yes   Do you have any problems affording any of your prescribed medications? No   Discharge Plan A Rehab   Discharge Plan B Home with family;Home Health   DME Needed Upon Discharge  other (see comments)  (tbd)   Anticipated Discharge Disposition Rehab   Can the patient answer the patient profile reliably?   (Oriented to person and place. Expressive aphasia. )   How does the patient rate their overall health at the present time?   (john)   Describe the patient's ability to walk at the present time. Major restrictions/daily assistance from another person   How often would a person be available to care for the patient? Whenever needed   Number of comorbid conditions (as recorded on the chart) None   During the past month, has the patient often been bothered by feeling down, depressed or hopeless?   (john)   During the past month, has the patient often been bothered by little interest or pleasure in doing things?   (john)   Post-Acute Status   Post-Acute Authorization Placement   Post-Acute Placement Status Awaiting Internal Medical Clearance   Discharge Delays None known at this time       Dorie Aggarwal RN, CCRN-K, [facility]  Neuro-Critical Care   [phone]

## 2019-07-18 NOTE — ASSESSMENT & PLAN NOTE
- platelet count to drop over the last 3 days. Most recently ~90  - patient is receiving Heparin 5000u BID for DVT ppx  - continue to monitor

## 2019-07-18 NOTE — ASSESSMENT & PLAN NOTE
- s/p JM closure in 2017  - Consulted cardiology today to determine if ABDIAS is necessary to decide on AC or if the end result is simply to be placed on AC  - hold anticoagulation,  - telemetry    - Bradycardia + diaphoresis episode 7/17 at night.  Given Atropine which resolved the episode.  Potential stroke/ cardiac syndrome.

## 2019-07-18 NOTE — CONSULTS
[facility]  Cardiology  Consult Note    Patient Name: Justyn Rawls  MRN: [id number]  Admission Date: 7/14/2019  Hospital Length of Stay: 4 days  Code Status: Full Code   Attending Provider: Ernesto Duran MD   Consulting Provider: Gris Sandoval MD  Primary Care Physician: No primary care provider on file.  Principal Problem:Stroke due to embolism of left middle cerebral artery    Patient information was obtained from patient and ER records.     Consults  Subjective:     Chief Complaint:  L MCA embolism s/p tPA     HPI:   Pt is 49yoM w/ PMH L sided occipital and temporal CVA (2014), a fib s/p Watchman device (2017), and alcohol abuse who presented w/ stroke d/t L MCA embolism s/p tPA. MRI showed infarct in L MCA, superimposed petechial hemorrhage in L basal ganglia, insula, frontal, and parietal lobes, and localized mass effect. TTE on 7/14 showed EF 60% w/ normal systolic and diastolic function.     Past Medical History:   Diagnosis Date    CVA (cerebral infarction) 4/22/2016    Paroxysmal atrial fibrillation 4/22/2016    Stroke        Past Surgical History:   Procedure Laterality Date    watchman device         Review of patient's allergies indicates:  No Known Allergies    No current facility-administered medications on file prior to encounter.      No current outpatient medications on file prior to encounter.     Family History     Problem Relation (Age of Onset)    Cancer Father    Early death Father    Hyperlipidemia Mother    Hypertension Mother        Tobacco Use    Smoking status: Never Smoker    Smokeless tobacco: Never Used   Substance and Sexual Activity    Alcohol use: Yes     Comment: 2 times per month    Drug use: No    Sexual activity: Yes     Review of Systems   Reason unable to perform ROS: Pt unable to answer questions because of expressive aphasia.     Objective:     Vital Signs (Most Recent):  Temp: 98.9 °F (37.2 °C) (07/18/19 1100)  Pulse: 64 (07/18/19  1300)  Resp: (!) 21 (07/18/19 1300)  BP: 135/74 (07/18/19 1300)  SpO2: 100 % (07/18/19 1300) Vital Signs (24h Range):  Temp:  [98.4 °F (36.9 °C)-98.9 °F (37.2 °C)] 98.9 °F (37.2 °C)  Pulse:  [37-85] 64  Resp:  [14-30] 21  SpO2:  [85 %-100 %] 100 %  BP: (109-163)/(58-94) 135/74  Arterial Line BP: ()/(59-91) 137/72     Weight: 89.4 kg (197 lb)  Body mass index is 23.98 kg/m².    SpO2: 100 %  O2 Device (Oxygen Therapy): room air      Intake/Output Summary (Last 24 hours) at 7/18/2019 1535  Last data filed at 7/18/2019 0602  Gross per 24 hour   Intake 1166.67 ml   Output 405 ml   Net 761.67 ml       Lines/Drains/Airways     Arterial Line                 Arterial Line 07/15/19 0218 Right Radial 3 days          Peripheral Intravenous Line                 Peripheral IV - Single Lumen 07/17/19 1406 20 G Left Antecubital 1 day         Peripheral IV - Single Lumen 07/17/19 1833 Anterior;Right Foot less than 1 day                Physical Exam   Constitutional: He appears well-developed and well-nourished.   HENT:   Head: Normocephalic and atraumatic.   Right sided facial drooping   Eyes:   EOM abnormal- unable to look in different directions without turning head   Neck: Normal range of motion. Neck supple. No JVD present.   Cardiovascular: Normal rate. Exam reveals no gallop and no friction rub.   No murmur heard.  No carotid bruits   Pulmonary/Chest: Effort normal. No respiratory distress. He exhibits no tenderness.   Decreased breath sounds in LL b/l   Abdominal: Soft. Bowel sounds are normal. He exhibits no distension. There is no tenderness.   Musculoskeletal: He exhibits no edema, tenderness or deformity.   Lymphadenopathy:     He has no cervical adenopathy.   Neurological: A cranial nerve deficit is present. Coordination abnormal.   Pt has no sensation or movement in RUE.  Can move LUE but has poor motor control (cannot squeeze fingers)  Pt has expressive aphasia   Skin: Skin is warm and dry. No rash noted. No  erythema.         Assessment and Plan:     * Stroke due to embolism of left middle cerebral artery  Pt is 50yo M w/ PMH afib s/p watchman in 2017 who presented w/ stroke d/t L MCA embolism s/p tPA.     Plan:  - Carotid U/S to evaluate for possible source of emboli  - ABDIAS with bubble study to assess watchman placement, evaluate for thrombus and evaluate for patent ASD  - Continue holding anticoagulation  - Suggest antiplatelet therapy per guidelines (ASA monotherapy since pt had major stroke)  - NPO at midnight        VTE Risk Mitigation (From admission, onward)        Ordered     heparin (porcine) injection 5,000 Units  Every 8 hours      07/16/19 1001     IP VTE LOW RISK PATIENT  Once      07/14/19 0227     Place sequential compression device  Until discontinued      07/14/19 0227          Thank you for your consult. I will follow-up with patient. Please contact us if you have any additional questions.    Gris Sandoval MD  Cardiology   [facility]

## 2019-07-18 NOTE — PLAN OF CARE
Problem: Adult Inpatient Plan of Care  Goal: Plan of Care Review  Outcome: Ongoing (interventions implemented as appropriate)  POC reviewed with pt and spouse at 0500. Pt and spouse both verbalized understanding. Questions and concerns addressed. No acute events overnight. SBP <160 maintained, no issues. 2% infusing at 75 ml/hr. q6 Na levels being drawn. Na within goal of 145-150. Mag being replaced. Pt progressing toward goals. Will continue to monitor. See flowsheets for full assessment and VS info.

## 2019-07-18 NOTE — SUBJECTIVE & OBJECTIVE
Interval History:  Consulted cardiology to determine if ABDIAS is medically necessary or if AC will be started regardless of findings on ABDIAS.  Patient removed from NPO today. Patient had one episode of bradycardia and diaphoresis last night that resolved after Atropine was given.  Patient has tolerated food well today and continues to have bowel movements.     Review of Systems:   Review of Systems   Constitutional: Negative for chills and fever.   Eyes: Negative for blurred vision and double vision.   Respiratory: Negative for cough and shortness of breath.    Cardiovascular: Negative for chest pain and palpitations.   Gastrointestinal: Negative for nausea and vomiting.   Genitourinary: Negative for dysuria.   Skin: Negative for rash.   Neurological: Positive for speech change and focal weakness.     Vitals:   Temp: 98.9 °F (37.2 °C)  Pulse: 64  Rhythm: normal sinus rhythm  BP: 135/74  MAP (mmHg): 99  Resp: (!) 21  SpO2: 100 %  O2 Device (Oxygen Therapy): room air    Temp  Min: 98.4 °F (36.9 °C)  Max: 98.9 °F (37.2 °C)  Pulse  Min: 37  Max: 85  BP  Min: 109/68  Max: 163/94  MAP (mmHg)  Min: 80  Max: 116  Resp  Min: 14  Max: 30  SpO2  Min: 85 %  Max: 100 %    07/17 0701 - 07/18 0700  In: 2157.5 [I.V.:2157.5]  Out: 405 [Urine:405]   Unmeasured Output  Urine Occurrence: 1  Stool Occurrence: 1  Emesis Occurrence: 1     Examination:   Constitutional: Well-nourished and -developed. No apparent distress.   Eyes: Conjunctiva clear, anicteric. Lids no lesions.  Head/Ears/Nose/Mouth/Throat/Neck: Moist mucous membranes. External ears, nose atraumatic.   Cardiovascular: Regular rhythm. No murmurs. No leg edema.  Respiratory: Breathing normally. Clear to auscultation.  Gastrointestinal: No hernia. Soft, nondistended, nontender. + bowel sounds.    Neurologic:  -GCS E4V5M6  -Alert.  Mixed aphasia picture. Follows commands. Oriented to self.   -Cranial nerves II-XII grossly intact.  Facial droop on R side.   - No spontaneous movement  of RUE noted; strength 0/5.  Distal RLE lack of spontaneous movement below the knee; strength (0/5). RLE above the knee exhibits full strength 5/5/.  Decreased light tough sensation noted in RUE and RLE. Sensation to pin prick intact in RUE and RLE.  LUE and LLE exhibit full strength 5/5 and sensation.    Medications:   Continuous  Sodium Chloride 2% Last Rate: 75 mL/hr at 07/18/19 1214   Scheduled  atorvastatin 40 mg Daily   folic acid 1 mg Daily   heparin (porcine) 5,000 Units Q8H   senna-docusate 8.6-50 mg 1 tablet BID   thiamine 100 mg Daily   PRN  acetaminophen 650 mg Q6H PRN   atropine 1 mg Q2H PRN   magnesium oxide 800 mg PRN   magnesium oxide 800 mg PRN   ondansetron 8 mg Q8H PRN   potassium chloride 10% 40 mEq PRN   potassium chloride 10% 40 mEq PRN   potassium chloride 10% 40 mEq PRN   potassium, sodium phosphates 2 packet PRN   potassium, sodium phosphates 2 packet PRN   potassium, sodium phosphates 2 packet PRN   sodium chloride 0.9% 10 mL PRN      Today I independently reviewed pertinent medications, lines/drains/airways, laboratory results,      ISTAT: No results for input(s): PH, PCO2, PO2, POCSATURATED, HCO3, BE, POCNA, POCK, POCTCO2, POCGLU, POCICA, POCLAC, SAMPLE in the last 24 hours.   Chem: Recent Labs   Lab 07/18/19  0122 07/18/19  1206   *   < > 145   K 3.5  --   --    *  --   --    CO2 21*  --   --    *  --   --    BUN 5*  --   --    CREATININE 0.8  --   --    ESTGFRAFRICA >60.0  --   --    EGFRNONAA >60.0  --   --    CALCIUM 7.5*  --   --    MG 1.4*  --   --    PHOS 3.1  --   --    ANIONGAP 8  --   --    PROT 4.8*  --   --    ALBUMIN 2.5*  --   --    BILITOT 0.3  --   --    ALKPHOS 44*  --   --    AST 14  --   --    ALT 13  --   --     < > = values in this interval not displayed.     Heme: Recent Labs   Lab 07/18/19  0122   WBC 7.94   HGB 10.8*   HCT 33.7*   PLT 91*     Endo:   Recent Labs   Lab 07/17/19  2351 07/18/19  0605 07/18/19  1220   POCTGLUCOSE 161* 75 154*       Diet  Diet Dysphagia Mechanical Soft (IDDSI Level 5) [facility]; Thin  Diet Dysphagia Mechanical Soft (IDDSI Level 5) [facility]; Thin

## 2019-07-18 NOTE — SUBJECTIVE & OBJECTIVE
Vitals:   Temp: 98.9 °F (37.2 °C)  Pulse: 64  Rhythm: normal sinus rhythm  BP: 135/74  MAP (mmHg): 99  Resp: (!) 21  SpO2: 100 %  O2 Device (Oxygen Therapy): room air    Temp  Min: 98.4 °F (36.9 °C)  Max: 98.9 °F (37.2 °C)  Pulse  Min: 37  Max: 85  BP  Min: 109/68  Max: 163/94  MAP (mmHg)  Min: 80  Max: 116  Resp  Min: 14  Max: 30  SpO2  Min: 85 %  Max: 100 %    07/17 0701 - 07/18 0700  In: 2157.5 [I.V.:2157.5]  Out: 405 [Urine:405]   Unmeasured Output  Urine Occurrence: 1  Stool Occurrence: 1  Emesis Occurrence: 1     Examination:   Constitutional: Well-nourished and -developed. No apparent distress.   Eyes: Conjunctiva clear, anicteric. Lids no lesions.  Head/Ears/Nose/Mouth/Throat/Neck: Moist mucous membranes. External ears, nose atraumatic.   Cardiovascular: Regular rhythm. No murmurs. No leg edema.  Respiratory: Comfortable respirations. Clear to auscultation.  Gastrointestinal: No hernia. Soft, nondistended, nontender. + bowel sounds.    Neurologic:  -GCS E3V3M6  -Drowsy at baseline. Oriented to person and place. Expressive aphasia. Follows commands.  -Cranial nerves II-XII grossly intact.  Facial droop on the right side  - No spontaneous movement of RUE noted; strength 0/5.  Distal RLE lack of spontaneous movement below the knee; strength (0/5). RLE above the knee exhibits full strength 5/5/.  Decreased light tough sensation noted in RUE and RLE. Sensation to pin prick intact in RUE and RLE.  LUE and LLE exhibit full strength 5/5 and sensation.    Medications:   Continuous    Sodium Chloride 2% Last Rate: 75 mL/hr at 07/18/19 1214   Scheduled    atorvastatin 40 mg Daily   folic acid 1 mg Daily   heparin (porcine) 5,000 Units Q8H   senna-docusate 8.6-50 mg 1 tablet BID   thiamine 100 mg Daily   PRN    acetaminophen 650 mg Q6H PRN   atropine 1 mg Q2H PRN   magnesium oxide 800 mg PRN   magnesium oxide 800 mg PRN   ondansetron 8 mg Q8H PRN   potassium chloride 10% 40 mEq PRN   potassium chloride 10% 40 mEq  PRN   potassium chloride 10% 40 mEq PRN   potassium, sodium phosphates 2 packet PRN   potassium, sodium phosphates 2 packet PRN   potassium, sodium phosphates 2 packet PRN   sodium chloride 0.9% 10 mL PRN      Today I independently reviewed pertinent medications, lines/drains/airways, imaging,      ISTAT: No results for input(s): PH, PCO2, PO2, POCSATURATED, HCO3, BE, POCNA, POCK, POCTCO2, POCGLU, POCICA, POCLAC, SAMPLE in the last 24 hours.   Chem:   Recent Labs   Lab 07/18/19  0122  07/18/19  1206   *   < > 145   K 3.5  --   --    *  --   --    CO2 21*  --   --    *  --   --    BUN 5*  --   --    CREATININE 0.8  --   --    ESTGFRAFRICA >60.0  --   --    EGFRNONAA >60.0  --   --    CALCIUM 7.5*  --   --    MG 1.4*  --   --    PHOS 3.1  --   --    ANIONGAP 8  --   --    PROT 4.8*  --   --    ALBUMIN 2.5*  --   --    BILITOT 0.3  --   --    ALKPHOS 44*  --   --    AST 14  --   --    ALT 13  --   --     < > = values in this interval not displayed.     Heme:   Recent Labs   Lab 07/18/19  0122   WBC 7.94   HGB 10.8*   HCT 33.7*   PLT 91*     Endo:   Recent Labs   Lab 07/17/19  2351 07/18/19  0605 07/18/19  1220   POCTGLUCOSE 161* 75 154*        Diet  D[facility] Dysphagia [facility] Level [facility]; Thin  Diet Dysphagia Mechanical Soft (IDDSI Level 5) [facility]; Thin      Subjective:         HPI, Past Medical, Family, and Social History remains the same as documented in the initial encounter.     Review of Systems    Scheduled Meds:   atorvastatin  40 mg Oral Daily    folic acid  1 mg Oral Daily    heparin (porcine)  5,000 Units Subcutaneous Q8H    senna-docusate 8.6-50 mg  1 tablet Oral BID    thiamine  100 mg Oral Daily     Continuous Infusions:   Sodium Chloride 2% 75 mL/hr at 07/18/19 1214     PRN Meds:acetaminophen, atropine, magnesium oxide, magnesium oxide, ondansetron, potassium chloride 10%, potassium chloride 10%, potassium chloride 10%, potassium, sodium  phosphates, potassium, sodium phosphates, potassium, sodium phosphates, sodium chloride 0.9%    Objective:     Vital Signs (Most Recent):  Temp: 98.9 °F (37.2 °C) (07/18/19 1100)  Pulse: 64 (07/18/19 1300)  Resp: (!) 21 (07/18/19 1300)  BP: 135/74 (07/18/19 1300)  SpO2: 100 % (07/18/19 1300)  BP Location: Left arm    Vital Signs Range (Last 24H):  Temp:  [98.4 °F (36.9 °C)-98.9 °F (37.2 °C)]   Pulse:  [37-85]   Resp:  [14-30]   BP: (109-163)/(58-94)   SpO2:  [85 %-100 %]   Arterial Line BP: ()/(59-91)   BP Location: Left arm    Examination:   Constitutional: Well-nourished and -developed. No apparent distress.   Eyes: Conjunctiva clear, anicteric. Lids no lesions.  Head/Ears/Nose/Mouth/Throat/Neck: Moist mucous membranes. External ears, nose atraumatic.   Cardiovascular: Regular rhythm. No murmurs. No leg edema.  Respiratory: Comfortable respirations. Clear to auscultation.  Gastrointestinal: No hernia. Soft, nondistended, nontender. + bowel sounds.    Neurologic:  -GCS E3V3M6  -Drowsy at baseline. Oriented to person and place. Expressive aphasia. Follows commands.  -Cranial nerves II-XII grossly intact.  Facial droop on the right side  - No spontaneous movement of RUE noted; strength 0/5.  Distal RLE lack of spontaneous movement below the knee; strength (0/5). RLE above the knee exhibits full strength 5/5/.  Decreased light tough sensation noted in RUE and RLE. Sensation to pin prick intact in RUE and RLE.  LUE and LLE exhibit full strength 5/5 and    Laboratory:  CMP:   Recent Labs   Lab 07/18/19  0122  07/18/19  1206   CALCIUM 7.5*  --   --    ALBUMIN 2.5*  --   --    PROT 4.8*  --   --    *   < > 145   K 3.5  --   --    CO2 21*  --   --    *  --   --    BUN 5*  --   --    CREATININE 0.8  --   --    ALKPHOS 44*  --   --    ALT 13  --   --    AST 14  --   --    BILITOT 0.3  --   --     < > = values in this interval not displayed.     BMP:   Recent Labs   Lab 07/18/19  0122  07/18/19  1206   NA  146*   < > 145   K 3.5  --   --    *  --   --    CO2 21*  --   --    BUN 5*  --   --    CREATININE 0.8  --   --    CALCIUM 7.5*  --   --     < > = values in this interval not displayed.     CBC:   Recent Labs   Lab 07/18/19  0122   WBC 7.94   RBC 3.31*   HGB 10.8*   HCT 33.7*   PLT 91*   *   MCH 32.6*   MCHC 32.0     Lipid Panel:   Recent Labs   Lab 07/14/19  0456   CHOL 159   LDLCALC 91.6   HDL 58   TRIG 47     Coagulation:   Recent Labs   Lab 07/15/19  1005   INR 1.1   APTT 22.9     Platelet Aggregation Study: No results for input(s): PLTAGG, PLTAGINTERP, PLTAGREGLACO, ADPPLTAGGREG in the last 168 hours.  Hgb A1C:   Recent Labs   Lab 07/14/19  0300   HGBA1C 5.1     TSH:   Recent Labs   Lab 07/14/19  0300   TSH 0.527       Physical Exam

## 2019-07-18 NOTE — ASSESSMENT & PLAN NOTE
- s/p tPA over 24 hours ago  - 7/14: CTA showed prox L MCA occlusion s/p thrombectomy w/ TICI2c flows post intervention  - 7/17 MRI: Multifocal moderate to large regions of acute/recent infarction primarily left MCA territory.  There is superimposed petechial regions of hemorrhage within the left basal ganglia, insula and frontal and parietal lobes.    - continue 2% hypertonic saline for edema. Lowered to 75 ml/hr due to potential fluid overload.    - CXA obtained to continue to monitor fluid status.   - vasc neurology following, appreciate recs  - HOB flat post procedure  - SBP <160  - q1 neurochecks  - hold anticoagulation  - echo preformed on 7/14: EF 60% with normal LV systolic/ diastolic function and normal RV systolic function with mild tricuspid regurgitation.  - Per speech eval, placed on dysphagia diet. Appreciate further recs.  - PT/OT/ST
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
## 2019-07-18 NOTE — PT/OT/SLP PROGRESS
Physical Therapy      Patient Name:  Justyn Rawls   MRN:  [id number]    Therapy hold this PM. Therapist attempted session in AM- pt's spouse requesting to hold therapy session until pt ate lunch to hopefully increase energy level/participation. Therapist returned in PM at 1318. Pt's family reported pt experiencing labored breathing when trying to elevate HOB to eat earlier. Pt now asleep, with vital signs stable. However, pt's family requesting pt rest and avoid exerting himself. Hold therapy this PM. Discussed with RN. Will follow-up tomorrow, 7/19.     Opal Manzano, PT, DPT   7/18/2019

## 2019-07-18 NOTE — SUBJECTIVE & OBJECTIVE
Past Medical History:   Diagnosis Date    CVA (cerebral infarction) 4/22/2016    Paroxysmal atrial fibrillation 4/22/2016    Stroke        Past Surgical History:   Procedure Laterality Date    watchman device         Review of patient's allergies indicates:  No Known Allergies    No current facility-administered medications on file prior to encounter.      No current outpatient medications on file prior to encounter.     Family History     Problem Relation (Age of Onset)    Cancer Father    Early death Father    Hyperlipidemia Mother    Hypertension Mother        Tobacco Use    Smoking status: Never Smoker    Smokeless tobacco: Never Used   Substance and Sexual Activity    Alcohol use: Yes     Comment: 2 times per month    Drug use: No    Sexual activity: Yes     Review of Systems   Reason unable to perform ROS: Pt unable to answer questions because of expressive aphasia.     Objective:     Vital Signs (Most Recent):  Temp: 98.9 °F (37.2 °C) (07/18/19 1100)  Pulse: 64 (07/18/19 1300)  Resp: (!) 21 (07/18/19 1300)  BP: 135/74 (07/18/19 1300)  SpO2: 100 % (07/18/19 1300) Vital Signs (24h Range):  Temp:  [98.4 °F (36.9 °C)-98.9 °F (37.2 °C)] 98.9 °F (37.2 °C)  Pulse:  [37-85] 64  Resp:  [14-30] 21  SpO2:  [85 %-100 %] 100 %  BP: (109-163)/(58-94) 135/74  Arterial Line BP: ()/(59-91) 137/72     Weight: 89.4 kg (197 lb)  Body mass index is 23.98 kg/m².    SpO2: 100 %  O2 Device (Oxygen Therapy): room air      Intake/Output Summary (Last 24 hours) at 7/18/2019 1535  Last data filed at 7/18/2019 0602  Gross per 24 hour   Intake 1166.67 ml   Output 405 ml   Net 761.67 ml       Lines/Drains/Airways     Arterial Line                 Arterial Line 07/15/19 0218 Right Radial 3 days          Peripheral Intravenous Line                 Peripheral IV - Single Lumen 07/17/19 1406 20 G Left Antecubital 1 day         Peripheral IV - Single Lumen 07/17/19 1833 Anterior;Right Foot less than 1 day                Physical  Exam   Constitutional: He appears well-developed and well-nourished.   HENT:   Head: Normocephalic and atraumatic.   Right sided facial drooping   Eyes:   EOM abnormal- unable to look in different directions without turning head   Neck: Normal range of motion. Neck supple. No JVD present.   Cardiovascular: Normal rate. Exam reveals no gallop and no friction rub.   No murmur heard.  No carotid bruits   Pulmonary/Chest: Effort normal. No respiratory distress. He exhibits no tenderness.   Decreased breath sounds in LL b/l   Abdominal: Soft. Bowel sounds are normal. He exhibits no distension. There is no tenderness.   Musculoskeletal: He exhibits no edema, tenderness or deformity.   Lymphadenopathy:     He has no cervical adenopathy.   Neurological: A cranial nerve deficit is present. Coordination abnormal.   Pt has no sensation or movement in RUE.  Can move LUE but has poor motor control (cannot squeeze fingers)  Pt has expressive aphasia   Skin: Skin is warm and dry. No rash noted. No erythema.

## 2019-07-18 NOTE — ASSESSMENT & PLAN NOTE
Stroke risk factor  Rate control  CHADVASC score: 2  HAS BLED score: 2  Needs anticoagulation - Ok to start Apixban 5 mg BID

## 2019-07-18 NOTE — PROGRESS NOTES
[facility]  Vascular Neurology  Comprehensive Stroke Center  Progress Note    Assessment/Plan:     * Stroke due to embolism of left middle cerebral artery  48 y/o male with L MCA stroke due to M1 occlusion received IV TPA and thrombectomy to TICI 2C, probable due to afib    Antithrombotics: Need anticoagulation after TPA window    Statins: Lipitor 40 mg daily    Aggressive risk factor modification: A-Fib, alcohol     Rehab efforts: The patient has been evaluated by a stroke team provider and the therapy needs have been fully considered based off the presenting complaints and exam findings. The following therapy evaluations are needed: PT evaluate and treat, OT evaluate and treat, SLP evaluate and treat, PM&R evaluate for appropriate placement    Diagnostics ordered/pending: HgbA1C to assess blood glucose levels, Lipid Profile to assess cholesterol levels, MRI head without contrast to assess brain parenchyma, TTE to assess cardiac function/status     VTE prophylaxis: SCD's raquel begin Heparin 5000 units sc Q8H on 7-15-10 at 0600    BP parameters: Infarct: Post sucessful thrombectomy, SBP <140        Aphasia  Due to stroke  Aggressive therapy    Hemiparesis, right  Due to stroke  Aggressive therapy    S/P admn tPA in diff fac w/n last 24 hr bef adm to crnt fac  Close monitoring in NCC 24 hours post administration         PAF (paroxysmal atrial fibrillation)  Stroke risk factor  Rate control  CHADVASC score: 2  HAS BLED score: 2  Needs anticoagulation - Ok to start Apixban 5 mg BID         Patient was admitted on 07/14 with L MCA ischemic stroke resulted in right side weakness, aphasia, facial drop and LT gaze prefrance. NIH on admission 13. He is S/P tpa and thrombectomy     STROKE DOCUMENTATION   Acute Stroke Times   Last Known Normal Date: 07/13/19  Last Known Normal Time: 2130  Symptom Onset Date: 07/13/19  Symptom Onset Time: 2130  Stroke Team Called Date: 07/13/19  Stroke Team Called Time:  2245  Stroke Team Arrival Date: 07/14/19  Stroke Team Arrival Time: 0107  CT Interpretation Time: 2251  Decision to Treat Time for Alteplase: 2307(bolus given)  Decision to Treat Time for IR: 0107     NIH Scale:  1a. Level of Consciousness: 0-->Alert, keenly responsive  1b. LOC Questions: 1-->Answers one question correctly  1c. LOC Commands: 0-->Performs both tasks correctly  2. Best Gaze: 0-->Normal  3. Visual: 1-->Partial hemianopia  4. Facial Palsy: 2-->Partial paralysis (total or near-total paralysis of lower face)  5a. Motor Arm, Left: 0-->No drift, limb holds 90 (or 45) degrees for full 10 secs  5b. Motor Arm, Right: 3-->No effort against gravity, limb falls  6a. Motor Leg, Left: 0-->No drift, leg holds 30 degree position for full 5 secs  6b. Motor Leg, Right: 2-->Some effort against gravity, leg falls to bed by 5 secs, but has some effort against gravity  7. Limb Ataxia: 0-->Absent  8. Sensory: 0-->Normal, no sensory loss  9. Best Language: 1-->Mild-to-moderate aphasia, some obvious loss of fluency or facility of comprehension, without significant limitation on ideas expressed or form of expression. Reduction of speech and/or comprehension, however, makes conversation. . . (see row details)  10. Dysarthria: 1-->Mild-to-moderate dysarthria, patient slurs at least some words and, at worst, can be understood with some difficulty  11. Extinction and Inattention (formerly Neglect): 0-->No abnormality  Total (NIH Stroke Scale): 11         Modified [place] Score: 0  [place] Coma Scale:14   ABCD2 Score:    [id number] Score:2  HAS -BLED Score:2  ICH Score:   Hunt & Ta Classification:          Vitals:   Temp: 98.9 °F (37.2 °C)  Pulse: 64  Rhythm: normal sinus rhythm  BP: 135/74  MAP (mmHg): 99  Resp: (!) 21  SpO2: 100 %  O2 Device (Oxygen Therapy): room air    Temp  Min: 98.4 °F (36.9 °C)  Max: 98.9 °F (37.2 °C)  Pulse  Min: 37  Max: 85  BP  Min: 109/68  Max: 163/94  MAP (mmHg)  Min: 80  Max: 116  Resp  Min: 14   Max: 30  SpO2  Min: 85 %  Max: 100 %    07/17 0701 - 07/18 0700  In: 2157.5 [I.V.:2157.5]  Out: 405 [Urine:405]   Unmeasured Output  Urine Occurrence: 1  Stool Occurrence: 1  Emesis Occurrence: 1     Examination:   Constitutional: Well-nourished and -developed. No apparent distress.   Eyes: Conjunctiva clear, anicteric. Lids no lesions.  Head/Ears/Nose/Mouth/Throat/Neck: Moist mucous membranes. External ears, nose atraumatic.   Cardiovascular: Regular rhythm. No murmurs. No leg edema.  Respiratory: Comfortable respirations. Clear to auscultation.  Gastrointestinal: No hernia. Soft, nondistended, nontender. + bowel sounds.    Neurologic:  -GCS E3V3M6  -Drowsy at baseline. Oriented to person and place. Expressive aphasia. Follows commands.  -Cranial nerves II-XII grossly intact.  Facial droop on the right side  - No spontaneous movement of RUE noted; strength 0/5.  Distal RLE lack of spontaneous movement below the knee; strength (0/5). RLE above the knee exhibits full strength 5/5/.  Decreased light tough sensation noted in RUE and RLE. Sensation to pin prick intact in RUE and RLE.  LUE and LLE exhibit full strength 5/5 and sensation.    Medications:   Continuous    Sodium Chloride 2% Last Rate: 75 mL/hr at 07/18/19 1214   Scheduled    atorvastatin 40 mg Daily   folic acid 1 mg Daily   heparin (porcine) 5,000 Units Q8H   senna-docusate 8.6-50 mg 1 tablet BID   thiamine 100 mg Daily   PRN    acetaminophen 650 mg Q6H PRN   atropine 1 mg Q2H PRN   magnesium oxide 800 mg PRN   magnesium oxide 800 mg PRN   ondansetron 8 mg Q8H PRN   potassium chloride 10% 40 mEq PRN   potassium chloride 10% 40 mEq PRN   potassium chloride 10% 40 mEq PRN   potassium, sodium phosphates 2 packet PRN   potassium, sodium phosphates 2 packet PRN   potassium, sodium phosphates 2 packet PRN   sodium chloride 0.9% 10 mL PRN      Today I independently reviewed pertinent medications, lines/drains/airways, imaging,      ISTAT: No results for input(s):  PH, PCO2, PO2, POCSATURATED, HCO3, BE, POCNA, POCK, POCTCO2, POCGLU, POCICA, POCLAC, SAMPLE in the last 24 hours.   Chem:   Recent Labs   Lab 07/18/19  0122  07/18/19  1206   *   < > 145   K 3.5  --   --    *  --   --    CO2 21*  --   --    *  --   --    BUN 5*  --   --    CREATININE 0.8  --   --    ESTGFRAFRICA >60.0  --   --    EGFRNONAA >60.0  --   --    CALCIUM 7.5*  --   --    MG 1.4*  --   --    PHOS 3.1  --   --    ANIONGAP 8  --   --    PROT 4.8*  --   --    ALBUMIN 2.5*  --   --    BILITOT 0.3  --   --    ALKPHOS 44*  --   --    AST 14  --   --    ALT 13  --   --     < > = values in this interval not displayed.     Heme:   Recent Labs   Lab 07/18/19  0122   WBC 7.94   HGB 10.8*   HCT 33.7*   PLT 91*     Endo:   Recent Labs   Lab 07/17/19  2351 07/18/19  0605 07/18/19  1220   POCTGLUCOSE 161* 75 154*        Diet  Diet Dysphagia Mechanical Soft (IDDSI Level 5) [facility]; Thin  Diet Dysphagia Mechanical Soft (IDDSI Level 5) [facility]; Thin      Subjective:         HPI, Past Medical, Family, and Social History remains the same as documented in the initial encounter.     Review of Systems    Scheduled Meds:   atorvastatin  40 mg Oral Daily    folic acid  1 mg Oral Daily    heparin (porcine)  5,000 Units Subcutaneous Q8H    senna-docusate 8.6-50 mg  1 tablet Oral BID    thiamine  100 mg Oral Daily     Continuous Infusions:   Sodium Chloride 2% 75 mL/hr at 07/18/19 1214     PRN Meds:acetaminophen, atropine, magnesium oxide, magnesium oxide, ondansetron, potassium chloride 10%, potassium chloride 10%, potassium chloride 10%, potassium, sodium phosphates, potassium, sodium phosphates, potassium, sodium phosphates, sodium chloride 0.9%    Objective:     Vital Signs (Most Recent):  Temp: 98.9 °F (37.2 °C) (07/18/19 1100)  Pulse: 64 (07/18/19 1300)  Resp: (!) 21 (07/18/19 1300)  BP: 135/74 (07/18/19 1300)  SpO2: 100 % (07/18/19 1300)  BP Location: Left arm    Vital Signs Range  (Last 24H):  Temp:  [98.4 °F (36.9 °C)-98.9 °F (37.2 °C)]   Pulse:  [37-85]   Resp:  [14-30]   BP: (109-163)/(58-94)   SpO2:  [85 %-100 %]   Arterial Line BP: ()/(59-91)   BP Location: Left arm    Examination:   Constitutional: Well-nourished and -developed. No apparent distress.   Eyes: Conjunctiva clear, anicteric. Lids no lesions.  Head/Ears/Nose/Mouth/Throat/Neck: Moist mucous membranes. External ears, nose atraumatic.   Cardiovascular: Regular rhythm. No murmurs. No leg edema.  Respiratory: Comfortable respirations. Clear to auscultation.  Gastrointestinal: No hernia. Soft, nondistended, nontender. + bowel sounds.    Neurologic:  -GCS E3V3M6  -Drowsy at baseline. Oriented to person and place. Expressive aphasia. Follows commands.  -Cranial nerves II-XII grossly intact.  Facial droop on the right side  - No spontaneous movement of RUE noted; strength 0/5.  Distal RLE lack of spontaneous movement below the knee; strength (0/5). RLE above the knee exhibits full strength 5/5/.  Decreased light tough sensation noted in RUE and RLE. Sensation to pin prick intact in RUE and RLE.  LUE and LLE exhibit full strength 5/5 and    Laboratory:  CMP:   Recent Labs   Lab 07/18/19  0122  07/18/19  1206   CALCIUM 7.5*  --   --    ALBUMIN 2.5*  --   --    PROT 4.8*  --   --    *   < > 145   K 3.5  --   --    CO2 21*  --   --    *  --   --    BUN 5*  --   --    CREATININE 0.8  --   --    ALKPHOS 44*  --   --    ALT 13  --   --    AST 14  --   --    BILITOT 0.3  --   --     < > = values in this interval not displayed.     BMP:   Recent Labs   Lab 07/18/19  0122  07/18/19  1206   *   < > 145   K 3.5  --   --    *  --   --    CO2 21*  --   --    BUN 5*  --   --    CREATININE 0.8  --   --    CALCIUM 7.5*  --   --     < > = values in this interval not displayed.     CBC:   Recent Labs   Lab 07/18/19  0122   WBC 7.94   RBC 3.31*   HGB 10.8*   HCT 33.7*   PLT 91*   *   MCH 32.6*   MCHC 32.0     Lipid  Panel:   Recent Labs   Lab 07/14/19  0456   CHOL 159   LDLCALC 91.6   HDL 58   TRIG 47     Coagulation:   Recent Labs   Lab 07/15/19  1005   INR 1.1   APTT 22.9     Platelet Aggregation Study: No results for input(s): PLTAGG, PLTAGINTERP, PLTAGREGLACO, ADPPLTAGGREG in the last 168 hours.  Hgb A1C:   Recent Labs   Lab 07/14/19  0300   HGBA1C 5.1     TSH:   Recent Labs   Lab 07/14/19  0300   TSH 0.527       Physical Exam          Zainab Caceres MD  Comprehensive Stroke Center  Department of Vascular Neurology   [facility]

## 2019-07-19 ENCOUNTER — CLINICAL SUPPORT (OUTPATIENT)
Dept: CARDIOLOGY | Facility: CLINIC | Age: 49
DRG: 023 | End: 2019-07-19
Attending: EMERGENCY MEDICINE
Payer: OTHER GOVERNMENT

## 2019-07-19 ENCOUNTER — ANESTHESIA EVENT (OUTPATIENT)
Dept: MEDSURG UNIT | Facility: HOSPITAL | Age: 49
DRG: 023 | End: 2019-07-19
Payer: OTHER GOVERNMENT

## 2019-07-19 LAB
ALBUMIN SERPL BCP-MCNC: 2.2 G/DL (ref 3.5–5.2)
ALP SERPL-CCNC: 50 U/L (ref 55–135)
ALT SERPL W/O P-5'-P-CCNC: 14 U/L (ref 10–44)
ANION GAP SERPL CALC-SCNC: 7 MMOL/L (ref 8–16)
AST SERPL-CCNC: 16 U/L (ref 10–40)
BACTERIA #/AREA URNS AUTO: ABNORMAL /HPF
BASOPHILS # BLD AUTO: 0.03 K/UL (ref 0–0.2)
BASOPHILS NFR BLD: 0.4 % (ref 0–1.9)
BILIRUB SERPL-MCNC: 0.5 MG/DL (ref 0.1–1)
BILIRUB UR QL STRIP: NEGATIVE
BUN SERPL-MCNC: 6 MG/DL (ref 6–20)
CALCIUM SERPL-MCNC: 7.8 MG/DL (ref 8.7–10.5)
CHLORIDE SERPL-SCNC: 111 MMOL/L (ref 95–110)
CLARITY UR REFRACT.AUTO: ABNORMAL
CO2 SERPL-SCNC: 23 MMOL/L (ref 23–29)
COLOR UR AUTO: YELLOW
CREAT SERPL-MCNC: 0.8 MG/DL (ref 0.5–1.4)
D DIMER PPP IA.FEU-MCNC: 8.1 MG/L FEU
DIFFERENTIAL METHOD: ABNORMAL
EOSINOPHIL # BLD AUTO: 0.2 K/UL (ref 0–0.5)
EOSINOPHIL NFR BLD: 2 % (ref 0–8)
ERYTHROCYTE [DISTWIDTH] IN BLOOD BY AUTOMATED COUNT: 13.3 % (ref 11.5–14.5)
EST. GFR  (AFRICAN AMERICAN): >60 ML/MIN/1.73 M^2
EST. GFR  (NON AFRICAN AMERICAN): >60 ML/MIN/1.73 M^2
FIBRINOGEN PPP-MCNC: 370 MG/DL (ref 182–366)
GLUCOSE SERPL-MCNC: 112 MG/DL (ref 70–110)
GLUCOSE UR QL STRIP: NEGATIVE
HCT VFR BLD AUTO: 32.8 % (ref 40–54)
HGB BLD-MCNC: 10.5 G/DL (ref 14–18)
HGB UR QL STRIP: ABNORMAL
HYALINE CASTS UR QL AUTO: 1 /LPF
IMM GRANULOCYTES # BLD AUTO: 0.08 K/UL (ref 0–0.04)
IMM GRANULOCYTES NFR BLD AUTO: 1 % (ref 0–0.5)
KETONES UR QL STRIP: NEGATIVE
LEFT CBA DIAS: 19 CM/S
LEFT CBA SYS: 71 CM/S
LEFT CCA DIST DIAS: 21 CM/S
LEFT CCA DIST SYS: 101 CM/S
LEFT CCA MID DIAS: 18 CM/S
LEFT CCA MID SYS: 106 CM/S
LEFT CCA PROX DIAS: 22 CM/S
LEFT CCA PROX SYS: 90 CM/S
LEFT ECA DIAS: 13 CM/S
LEFT ECA SYS: 87 CM/S
LEFT ICA DIST DIAS: 31 CM/S
LEFT ICA DIST SYS: 70 CM/S
LEFT ICA MID DIAS: 25 CM/S
LEFT ICA MID SYS: 64 CM/S
LEFT ICA PROX DIAS: 16 CM/S
LEFT ICA PROX SYS: 61 CM/S
LEFT VERTEBRAL DIAS: 20 CM/S
LEFT VERTEBRAL SYS: 60 CM/S
LEUKOCYTE ESTERASE UR QL STRIP: ABNORMAL
LYMPHOCYTES # BLD AUTO: 0.8 K/UL (ref 1–4.8)
LYMPHOCYTES NFR BLD: 9.9 % (ref 18–48)
MAGNESIUM SERPL-MCNC: 1.4 MG/DL (ref 1.6–2.6)
MCH RBC QN AUTO: 32.2 PG (ref 27–31)
MCHC RBC AUTO-ENTMCNC: 32 G/DL (ref 32–36)
MCV RBC AUTO: 101 FL (ref 82–98)
MICROSCOPIC COMMENT: ABNORMAL
MONOCYTES # BLD AUTO: 0.7 K/UL (ref 0.3–1)
MONOCYTES NFR BLD: 8.9 % (ref 4–15)
NEUTROPHILS # BLD AUTO: 6.2 K/UL (ref 1.8–7.7)
NEUTROPHILS NFR BLD: 77.8 % (ref 38–73)
NITRITE UR QL STRIP: POSITIVE
NRBC BLD-RTO: 0 /100 WBC
OHS CV CAROTID RIGHT ICA EDV HIGHEST: 34
OHS CV CAROTID ULTRASOUND LEFT ICA/CCA RATIO: 0.66
OHS CV CAROTID ULTRASOUND RIGHT ICA/CCA RATIO: 0.82
OHS CV PV CAROTID LEFT HIGHEST CCA: 106
OHS CV PV CAROTID LEFT HIGHEST ICA: 70
OHS CV PV CAROTID RIGHT HIGHEST CCA: 121
OHS CV PV CAROTID RIGHT HIGHEST ICA: 99
OHS CV US CAROTID LEFT HIGHEST EDV: 31
OSMOLALITY SERPL: 296 MOSM/KG (ref 280–300)
OSMOLALITY UR: 1035 MOSM/KG (ref 50–1200)
PH UR STRIP: 6 [PH] (ref 5–8)
PHOSPHATE SERPL-MCNC: 3.2 MG/DL (ref 2.7–4.5)
PLATELET # BLD AUTO: 85 K/UL (ref 150–350)
PMV BLD AUTO: 11.4 FL (ref 9.2–12.9)
POCT GLUCOSE: 117 MG/DL (ref 70–110)
POCT GLUCOSE: 98 MG/DL (ref 70–110)
POTASSIUM SERPL-SCNC: 3.3 MMOL/L (ref 3.5–5.1)
PROT SERPL-MCNC: 4.9 G/DL (ref 6–8.4)
PROT UR QL STRIP: ABNORMAL
RBC # BLD AUTO: 3.26 M/UL (ref 4.6–6.2)
RBC #/AREA URNS AUTO: 11 /HPF (ref 0–4)
RIGHT CBA DIAS: 21 CM/S
RIGHT CBA SYS: 80 CM/S
RIGHT CCA DIST DIAS: 16 CM/S
RIGHT CCA DIST SYS: 86 CM/S
RIGHT CCA MID DIAS: 21 CM/S
RIGHT CCA MID SYS: 121 CM/S
RIGHT CCA PROX DIAS: 17 CM/S
RIGHT CCA PROX SYS: 109 CM/S
RIGHT ECA DIAS: 19 CM/S
RIGHT ECA SYS: 77 CM/S
RIGHT ICA DIST DIAS: 34 CM/S
RIGHT ICA DIST SYS: 81 CM/S
RIGHT ICA MID DIAS: 30 CM/S
RIGHT ICA MID SYS: 78 CM/S
RIGHT ICA PROX DIAS: 22 CM/S
RIGHT ICA PROX SYS: 99 CM/S
RIGHT VERTEBRAL DIAS: 17 CM/S
RIGHT VERTEBRAL SYS: 59 CM/S
SODIUM SERPL-SCNC: 136 MMOL/L (ref 136–145)
SODIUM SERPL-SCNC: 140 MMOL/L (ref 136–145)
SODIUM SERPL-SCNC: 141 MMOL/L (ref 136–145)
SODIUM SERPL-SCNC: 145 MMOL/L (ref 136–145)
SODIUM UR-SCNC: 274 MMOL/L (ref 20–250)
SP GR UR STRIP: 1.02 (ref 1–1.03)
URN SPEC COLLECT METH UR: ABNORMAL
WBC # BLD AUTO: 7.94 K/UL (ref 3.9–12.7)
WBC #/AREA URNS AUTO: 17 /HPF (ref 0–5)

## 2019-07-19 PROCEDURE — 97112 NEUROMUSCULAR REEDUCATION: CPT

## 2019-07-19 PROCEDURE — 99233 PR SUBSEQUENT HOSPITAL CARE,LEVL III: ICD-10-PCS | Mod: ,,, | Performed by: PSYCHIATRY & NEUROLOGY

## 2019-07-19 PROCEDURE — 63600175 PHARM REV CODE 636 W HCPCS: Performed by: PHYSICIAN ASSISTANT

## 2019-07-19 PROCEDURE — 85379 FIBRIN DEGRADATION QUANT: CPT

## 2019-07-19 PROCEDURE — 80053 COMPREHEN METABOLIC PANEL: CPT

## 2019-07-19 PROCEDURE — 84295 ASSAY OF SERUM SODIUM: CPT

## 2019-07-19 PROCEDURE — 99233 PR SUBSEQUENT HOSPITAL CARE,LEVL III: ICD-10-PCS | Mod: ,,, | Performed by: INTERNAL MEDICINE

## 2019-07-19 PROCEDURE — 63600175 PHARM REV CODE 636 W HCPCS: Performed by: PSYCHIATRY & NEUROLOGY

## 2019-07-19 PROCEDURE — 20000000 HC ICU ROOM

## 2019-07-19 PROCEDURE — 93880 EXTRACRANIAL BILAT STUDY: CPT

## 2019-07-19 PROCEDURE — 84295 ASSAY OF SERUM SODIUM: CPT | Mod: 91

## 2019-07-19 PROCEDURE — 85384 FIBRINOGEN ACTIVITY: CPT

## 2019-07-19 PROCEDURE — 83935 ASSAY OF URINE OSMOLALITY: CPT

## 2019-07-19 PROCEDURE — 94761 N-INVAS EAR/PLS OXIMETRY MLT: CPT

## 2019-07-19 PROCEDURE — 85025 COMPLETE CBC W/AUTO DIFF WBC: CPT

## 2019-07-19 PROCEDURE — 83930 ASSAY OF BLOOD OSMOLALITY: CPT

## 2019-07-19 PROCEDURE — 83735 ASSAY OF MAGNESIUM: CPT

## 2019-07-19 PROCEDURE — 25000003 PHARM REV CODE 250: Performed by: PHYSICIAN ASSISTANT

## 2019-07-19 PROCEDURE — 93880 EXTRACRANIAL BILAT STUDY: CPT | Mod: 26,,, | Performed by: INTERNAL MEDICINE

## 2019-07-19 PROCEDURE — 93880 CV US DOPPLER CAROTID (CUPID ONLY): ICD-10-PCS | Mod: 26,,, | Performed by: INTERNAL MEDICINE

## 2019-07-19 PROCEDURE — 84100 ASSAY OF PHOSPHORUS: CPT

## 2019-07-19 PROCEDURE — 97535 SELF CARE MNGMENT TRAINING: CPT

## 2019-07-19 PROCEDURE — 63600175 PHARM REV CODE 636 W HCPCS: Performed by: NURSE PRACTITIONER

## 2019-07-19 PROCEDURE — A4217 STERILE WATER/SALINE, 500 ML: HCPCS | Performed by: PHYSICIAN ASSISTANT

## 2019-07-19 PROCEDURE — 99233 SBSQ HOSP IP/OBS HIGH 50: CPT | Mod: ,,, | Performed by: INTERNAL MEDICINE

## 2019-07-19 PROCEDURE — 99291 PR CRITICAL CARE, E/M 30-74 MINUTES: ICD-10-PCS | Mod: ,,, | Performed by: PSYCHIATRY & NEUROLOGY

## 2019-07-19 PROCEDURE — 81001 URINALYSIS AUTO W/SCOPE: CPT

## 2019-07-19 PROCEDURE — 63600175 PHARM REV CODE 636 W HCPCS: Performed by: STUDENT IN AN ORGANIZED HEALTH CARE EDUCATION/TRAINING PROGRAM

## 2019-07-19 PROCEDURE — 99233 SBSQ HOSP IP/OBS HIGH 50: CPT | Mod: ,,, | Performed by: PSYCHIATRY & NEUROLOGY

## 2019-07-19 PROCEDURE — 99291 CRITICAL CARE FIRST HOUR: CPT | Mod: ,,, | Performed by: PSYCHIATRY & NEUROLOGY

## 2019-07-19 PROCEDURE — A4217 STERILE WATER/SALINE, 500 ML: HCPCS | Performed by: PSYCHIATRY & NEUROLOGY

## 2019-07-19 PROCEDURE — 84300 ASSAY OF URINE SODIUM: CPT

## 2019-07-19 PROCEDURE — 63600175 PHARM REV CODE 636 W HCPCS

## 2019-07-19 PROCEDURE — 86022 PLATELET ANTIBODIES: CPT

## 2019-07-19 RX ORDER — FUROSEMIDE 10 MG/ML
INJECTION INTRAMUSCULAR; INTRAVENOUS
Status: COMPLETED
Start: 2019-07-19 | End: 2019-07-19

## 2019-07-19 RX ORDER — MAGNESIUM SULFATE/D5W 2 G/50 ML
2 INTRAVENOUS SOLUTION, PIGGYBACK (ML) INTRAVENOUS ONCE
Status: DISCONTINUED | OUTPATIENT
Start: 2019-07-19 | End: 2019-07-19

## 2019-07-19 RX ORDER — POTASSIUM CHLORIDE 7.45 MG/ML
20 INJECTION INTRAVENOUS ONCE
Status: COMPLETED | OUTPATIENT
Start: 2019-07-19 | End: 2019-07-19

## 2019-07-19 RX ORDER — MAGNESIUM SULFATE HEPTAHYDRATE 40 MG/ML
2 INJECTION, SOLUTION INTRAVENOUS ONCE
Status: COMPLETED | OUTPATIENT
Start: 2019-07-19 | End: 2019-07-19

## 2019-07-19 RX ORDER — FUROSEMIDE 10 MG/ML
20 INJECTION INTRAMUSCULAR; INTRAVENOUS ONCE
Status: COMPLETED | OUTPATIENT
Start: 2019-07-19 | End: 2019-07-19

## 2019-07-19 RX ADMIN — HEPARIN SODIUM 5000 UNITS: 5000 INJECTION, SOLUTION INTRAVENOUS; SUBCUTANEOUS at 06:07

## 2019-07-19 RX ADMIN — ONDANSETRON 8 MG: 2 INJECTION INTRAMUSCULAR; INTRAVENOUS at 08:07

## 2019-07-19 RX ADMIN — SODIUM CHLORIDE: 234 INJECTION INTRAMUSCULAR; INTRAVENOUS; SUBCUTANEOUS at 06:07

## 2019-07-19 RX ADMIN — ACETAMINOPHEN 650 MG: 325 TABLET ORAL at 08:07

## 2019-07-19 RX ADMIN — MAGNESIUM SULFATE IN WATER 2 G: 40 INJECTION, SOLUTION INTRAVENOUS at 03:07

## 2019-07-19 RX ADMIN — FUROSEMIDE 20 MG: 10 INJECTION, SOLUTION INTRAMUSCULAR; INTRAVENOUS at 10:07

## 2019-07-19 RX ADMIN — ONDANSETRON 8 MG: 2 INJECTION INTRAMUSCULAR; INTRAVENOUS at 12:07

## 2019-07-19 RX ADMIN — POTASSIUM CHLORIDE 20 MEQ: 10 INJECTION, SOLUTION INTRAVENOUS at 03:07

## 2019-07-19 RX ADMIN — SODIUM CHLORIDE: 234 INJECTION INTRAMUSCULAR; INTRAVENOUS; SUBCUTANEOUS at 01:07

## 2019-07-19 RX ADMIN — FUROSEMIDE 20 MG: 10 INJECTION INTRAMUSCULAR; INTRAVENOUS at 10:07

## 2019-07-19 NOTE — NURSING
Pt 0600 Na level 136. NCC made aware. Dinora CORDOVA NP ordered urine sodium when pt voids. Will continue to monitor.

## 2019-07-19 NOTE — PT/OT/SLP PROGRESS
Physical Therapy Treatment    Patient Name:  Justyn Rawls   MRN:  [id number]    Recommendations:     Discharge Recommendations:  rehabilitation facility   Discharge Equipment Recommendations: (TBD)   Barriers to discharge: increased level of skilled assistance required    Assessment:     Justyn Rawls is a 49 y.o. male admitted with a medical diagnosis of stroke due to embolism of left middle cerebral artery. Pt alert and able to follow simple commands during session; he requires reinforcement to maintain safety due to cognitive deficits. Mr. Rawls tolerated bed>chair stand pivot transfer with max A. Although his communication is impaired 2/2 aphasia, pt smiling and alert while sitting up-- per family, pt enjoys sitting and sleeping in recliner chairs at home.   He presents with the following impairments/functional limitations:  weakness, impaired endurance, gait instability, impaired functional mobilty, impaired self care skills, impaired balance, pain, decreased lower extremity function, decreased upper extremity function, impaired cognition, decreased safety awareness, impaired cardiopulmonary response to activity, edema. Pt remains an excellent candidate for inpatient rehabilitation, as he has a qualifying diagnosis, displays increasing activity tolerance, is motivated to participate, and has a strong support system willing to assist the pt upon discharge    Rehab Prognosis: Good; patient would benefit from acute skilled PT services to address these deficits and reach maximum level of function.      Recent Surgery: Procedure(s) (LRB):  ECHOCARDIOGRAM, TRANSESOPHAGEAL (N/A)      Plan:     During this hospitalization, patient to be seen 4 x/week to address the identified rehab impairments via gait training, therapeutic activities, therapeutic exercises, neuromuscular re-education, wheelchair management/training and progress toward the following goals:    · Plan of Care Expires:  08/13/19    Subjective  "    Patient/Family Comments/goals: pt with aphasia, primarily verbalizing "yes" to most questions. Unable to clearly state goals.   Pain/Comfort:  · Pain Rating 1: 0/10  · Pain Rating Post-Intervention 1: 0/10      Objective:     Communicated with RN prior to session.  Patient found supine with bed alarm, blood pressure cuff, peripheral IV, pulse ox (continuous), SCD, telemetry upon PT entry to room. Pt's spouse and sister-in-law at bedside.     General Precautions: Standard, fall, aphasia   Orthopedic Precautions:N/A   Braces: N/A     Functional Mobility:    Bed Mobility:   · Supine > Sit: minimum assistance   · Sit > Supine: N/T     Transfers:   · Sit <> Stand Transfer: moderate assistance from EOB with no AD   · Bed > Chair Stand Pivot Transfer: maximum assistance with no AD, blocking of RLE required     Standing Balance:   · Assistance level: maximum assistance with no AD                   Gait: Unable to perform 2/2 R hemiparesis and decreased safety awareness; pt able to pivot LLE during stand pivot t/f but unable to advance RLE     Therapeutic Activities and Exercises:  Therapeutic activities aimed to:  - increase pt's independence, safety, and efficiency with bed mobility and functional transfers. See above for assistance levels.   - educate pt and family on: safety with mobility, fall risk, PT POC, and AAROM/PROM for RLE and RUE. Therapist demonstrated PROM to R to improve functional use and prevent joint contractures, pt's spouse verbalized understanding of education.   - increase pt's tolerance for upright activity through performance of above listed activities. Pt appeared to enjoy sitting up in chair, with RUE elevated for edema reduction and joint approximation- RN aware.     Notes: Pt unable to visually track past midline; he demonstrates mild impulsivity and requires cueing for safety with medical lines and to control initiation of motor tasks. Pt's family attentive and encouraging to pt at bedside. "     Patient left up in chair with all lines intact, call button in reach, RN notified and family present.    AM-PAC 6 CLICK MOBILITY  Turning over in bed (including adjusting bedclothes, sheets and blankets)?: 3  Sitting down on and standing up from a chair with arms (e.g., wheelchair, bedside commode, etc.): 2  Moving from lying on back to sitting on the side of the bed?: 3  Moving to and from a bed to a chair (including a wheelchair)?: 2  Need to walk in hospital room?: 1  Climbing 3-5 steps with a railing?: 1  Basic Mobility Total Score: 12       GOALS:   Multidisciplinary Problems     Physical Therapy Goals        Problem: Physical Therapy Goal    Goal Priority Disciplines Outcome Goal Variances Interventions   Physical Therapy Goal     PT, PT/OT Ongoing (interventions implemented as appropriate)     Description:  PT goals until 7/28/19    1. Pt supine to sit with minimal assist- MET 7/19  2. Pt sit to supine with minimal assist-not met  3. Pt sit to stand with LRAD with minimal assist-not met  4. Pt to perform gait 20ft with LRAD with max assist.-not met  5. Pt to go up/down curb step with LRAD with max assist.-not met  6. Pt to transfer bed to/from bedside chair with moderate assist.-not met  7. Pt to perform B LE exs in sitting or supine x 20 reps to strengthen B LE to improve functional mobility.-not met                        Time Tracking:     PT Received On: 07/19/19  PT Start Time: 1039     PT Stop Time: 1107  PT Total Time (min): 28 min     Billable Minutes: Therapeutic Activity 28 min    Treatment Type: Treatment  PT/PTA: PT     PTA Visit Number: 0     Opal Manzano PT, DPT   7/19/2019  Pager: [phone]

## 2019-07-19 NOTE — ASSESSMENT & PLAN NOTE
50 y/o male with L MCA stroke due to M1 occlusion received IV TPA and thrombectomy to TICI 2C, probable due to afib    Antithrombotics: Need anticoagulation after TPA window    Statins: Lipitor 40 mg daily    Aggressive risk factor modification: A-Fib, alcohol     Rehab efforts: The patient has been evaluated by a stroke team provider and the therapy needs have been fully considered based off the presenting complaints and exam findings. The following therapy evaluations are needed: PT evaluate and treat, OT evaluate and treat, SLP evaluate and treat, PM&R evaluate for appropriate placement    Diagnostics ordered/pending: HgbA1C to assess blood glucose levels, Lipid Profile to assess cholesterol levels, MRI head without contrast to assess brain parenchyma, TTE to assess cardiac function/status     VTE prophylaxis: SCD's raquel began Heparin 5000 units sc Q8H on 7-15-10 at 0600 but held (07/19/2019) due to thrombocytopenia     BP parameters: Infarct: Post sucessful thrombectomy, SBP <140

## 2019-07-19 NOTE — PHYSICIAN QUERY
PT Name: Justyn Rawls  MR #: [id number]     Physician Query Form - Documentation Clarification      CDS: Amber Jesus RN  Contact information: [email or web address]    This form is a permanent document in the medical record.     Query Date: July 19, 2019    By submitting this query, we are merely seeking further clarification of documentation. Please utilize your independent clinical judgment when addressing the question(s) below.    The Medical record reflects the following:    Supporting Clinical Findings Location in Medical Record   Left MCA CVA   Watchman device  A fib  Brain edema  Brain compression  Encephalopathy     Needs MRI  Titrate HTS  ETT watch  Aspiration watch  Strict BP control  Monitor I:Os    Neurologic:  -GCS E4V5M6  -More Alert than yesterday but continues to be drowsy throughout the day. Oriented to person and place but not time. Expressive aphasia. Follows commands.   [facility] PN 7/16  Dr Duran                                                                        Doctor, please further specify encephalopathy diagnosis associated with above clinical findings.    Provider Use Only       [x  ] Encephalopathy due to CVA (brain compression and cerebral edema) POA       [  ] Encephalopathy, unspecified       [  ] Other type of encephalopathy: ___________________       [  ] Other diagnosis: __________________________                                                                                                             [  ] Clinically Undetermined

## 2019-07-19 NOTE — SUBJECTIVE & OBJECTIVE
Review of Systems   Reason unable to perform ROS: Pt unable to answer questions because of expressive aphasia. Pt did deny any chest pain or difficulty breathing.      Objective:     Vital Signs (Most Recent):  Temp: 99.8 °F (37.7 °C) (07/19/19 0800)  Pulse: 85 (07/19/19 0800)  Resp: (!) 37 (07/19/19 0800)  BP: 131/82 (07/19/19 0800)  SpO2: (!) 88 % (07/19/19 0800) Vital Signs (24h Range):  Temp:  [98.4 °F (36.9 °C)-100.3 °F (37.9 °C)] 99.8 °F (37.7 °C)  Pulse:  [44-85] 85  Resp:  [18-37] 37  SpO2:  [88 %-100 %] 88 %  BP: (118-157)/(65-93) 131/82  Arterial Line BP: (109-150)/(62-84) 148/82     Weight: 89.4 kg (197 lb)  Body mass index is 23.98 kg/m².     SpO2: (!) 88 %  O2 Device (Oxygen Therapy): room air      Intake/Output Summary (Last 24 hours) at 7/19/2019 0837  Last data filed at 7/19/2019 0800  Gross per 24 hour   Intake 2513.34 ml   Output 925 ml   Net 1588.34 ml       Lines/Drains/Airways     Arterial Line                 Arterial Line 07/15/19 0218 Right Radial 4 days          Peripheral Intravenous Line                 Peripheral IV - Single Lumen 07/17/19 1406 20 G Left Antecubital 1 day         Peripheral IV - Single Lumen 07/17/19 1833 Anterior;Right Foot 1 day                Physical Exam   Constitutional: He appears well-developed and well-nourished.   HENT:   Head: Normocephalic and atraumatic.   Right sided facial drooping   Eyes:   EOM abnormal- unable to look in different directions without turning head   Neck: Normal range of motion. Neck supple. No JVD present.   Cardiovascular: Normal rate. Exam reveals no gallop and no friction rub.   No murmur heard.  No carotid bruits   Pulmonary/Chest: Effort normal. No respiratory distress. He exhibits no tenderness.   Decreased breath sounds in LL b/l   Abdominal: Soft. Bowel sounds are normal. He exhibits no distension. There is tenderness.   Diffuse abdominal tenderness to palpation   Musculoskeletal: He exhibits edema. He exhibits no tenderness or  deformity.   2+ nonpitting edema in RUE and RLE   Lymphadenopathy:     He has no cervical adenopathy.   Neurological: A cranial nerve deficit is present. Coordination abnormal.   Pt has no sensation or movement in RUE or RLE.  Can move LUE but has poor motor control   Can move LLE but cannot follow commands like wiggling toes  Pt has expressive aphasia   Skin: Skin is warm and dry. No rash noted. No erythema.

## 2019-07-19 NOTE — ASSESSMENT & PLAN NOTE
- s/p tPA over 24 hours ago  - 7/14: CTA showed prox L MCA occlusion s/p thrombectomy w/ TICI2c flows post intervention  - 7/17 MRI: Multifocal moderate to large regions of acute/recent infarction primarily left MCA territory.  There is superimposed petechial regions of hemorrhage within the left basal ganglia, insula and frontal and parietal lobes.    - Discontinued 2% hypertonic saline due to edema causing pleural effusions and leg edema.   - Gave 20 mg lasix one time for volume overload  - Daily CXR  - SBP <190  - q1 neurochecks  - hold anticoagulation  - echo preformed on 7/14: EF 60% with normal LV systolic/ diastolic function and normal RV systolic function with mild tricuspid regurgitation.  - Restarted regular diet   - PT/OT/ST

## 2019-07-19 NOTE — PLAN OF CARE
Problem: Adult Inpatient Plan of Care  Goal: Plan of Care Review  Outcome: Ongoing (interventions implemented as appropriate)  POC reviewed with pt at 0500. Pt verbalized understanding. Questions and concerns addressed. No acute events overnight. SBP <160 maintained, no issues. 2% gtt infusing at 100 ml/hr. q6 Na levels being drawn. Mag and Potassium replaced IV. Pt NPO at midnight for ABDIAS procedure today. Pt progressing toward goals. Will continue to monitor. See flowsheets for full assessment and VS info.

## 2019-07-19 NOTE — PLAN OF CARE
Problem: Occupational Therapy Goal  Goal: Occupational Therapy Goal  Goals set 7/15 to be met by 7/29   Patient will increase functional independence with ADLs by performing:    UE Dressing with Minimal Assistance with hemiplegic technique.  LE Dressing with Minimal Assistance.  Grooming while standing with Contact Guard Assistance.   Toileting from bedside commode with Minimal Assistance for hygiene and clothing management.   Rolling to Left with Contact Guard Assistance.   Rolling to Right with Supervision.   Stand pivot transfers with Contact Guard Assistance.  Patient/Caregivers will be independent in assisting in bed mobility/positioning  Patient/Caregivers will be independent in HEP for weightbearing with RUE, PROM of RUE.       Goals remain appropriate.  Yesenia Hernandez, LOTR  07/19/2019

## 2019-07-19 NOTE — ASSESSMENT & PLAN NOTE
Patient presents with normal PLTs count and dropped while hospitalization could be heparin related  Workup thrombocytopenia as per primary team

## 2019-07-19 NOTE — ASSESSMENT & PLAN NOTE
Pt is 48yo M w/ PMH afib s/p watchman in 2017 who presented w/ stroke d/t L MCA embolism s/p tPA.     Plan:  - F/u Carotid U/S to evaluate for possible source of emboli  - F/u ABDIAS with bubble study to assess watchman placement, evaluate for thrombus and evaluate for patent ASD- delayed because of thrombocytopenia (Plt 85)  - Continue holding anticoagulation  - Suggest antiplatelet therapy per guidelines (ASA monotherapy since pt had major stroke)- consider holding this until Plts are increased

## 2019-07-19 NOTE — PLAN OF CARE
Problem: Physical Therapy Goal  Goal: Physical Therapy Goal  PT goals until 7/28/19    1. Pt supine to sit with minimal assist- MET 7/19  2. Pt sit to supine with minimal assist-not met  3. Pt sit to stand with LRAD with minimal assist-not met  4. Pt to perform gait 20ft with LRAD with max assist.-not met  5. Pt to go up/down curb step with LRAD with max assist.-not met  6. Pt to transfer bed to/from bedside chair with moderate assist.-not met  7. Pt to perform B LE exs in sitting or supine x 20 reps to strengthen B LE to improve functional mobility.-not met      Outcome: Ongoing (interventions implemented as appropriate)  Goals remain appropriate; continue current POC.     Opal Manzano PT, DPT   7/19/2019  Pager: [phone]

## 2019-07-19 NOTE — PT/OT/SLP PROGRESS
"Occupational Therapy   Treatment    Name: Justyn Rawls  MRN: [id number]  Admitting Diagnosis:  Stroke due to embolism of left middle cerebral artery       Recommendations:     Discharge Recommendations: rehabilitation facility  Discharge Equipment Recommendations:  (TBD)  Barriers to discharge:  (TBD)    Assessment:     Justyn Rawls is a 49 y.o. male with a medical diagnosis of Stroke due to embolism of left middle cerebral artery.  He presents with performance deficits affecting function are weakness, impaired endurance, impaired sensation, impaired functional mobilty, impaired balance, impaired cognition, decreased upper extremity function, decreased safety awareness, impaired coordination, impaired cardiopulmonary response to activity, impaired self care skills, gait instability, visual deficits, decreased coordination, decreased lower extremity function, decreased ROM, impaired fine motor, edema. Pt's presented with improved static sitting balance, ranging from min A to SBA.     Rehab Prognosis:  Good; patient would benefit from acute skilled OT services to address these deficits and reach maximum level of function.       Plan:     Patient to be seen 4 x/week to address the above listed problems via self-care/home management, therapeutic activities, therapeutic exercises, neuromuscular re-education, sensory integration, cognitive retraining  · Plan of Care Expires: 08/13/19  · Plan of Care Reviewed with: patient, spouse    Subjective     Patient: "I don't have any pain today" "Nope" when asked if he knew who OT was    Patient's wife: "he told me that he can't see out of his R eye. I asked him "is your vision blurry? Can you see at all?" and he said he "no.""      Pain/Comfort:  · Pain Rating 1: 0/10  · Pain Rating Post-Intervention 1: 0/10    Objective:     Communicated with: RN prior to session.  Patient found supine with arterial line, bed alarm, blood pressure cuff, peripheral IV, pulse ox (continuous), " "SCD, telemetry upon OT entry to room. Wife, Brandi, present throughout session.     General Precautions: Standard, aphasia, fall   Orthopedic Precautions:N/A   Braces: N/A     Occupational Performance:     Bed Mobility:    · Patient completed Rolling/Turning to Left with  moderate assistance 2* difficulty coordinating use of Rside  · Patient completed Scooting/Bridging with minimum assistance  · Patient completed Supine to Sit with moderate assistance 2* difficulty coordinating use of Rside  · Patient completed Sit to Supine with minimum assistance 2* difficulty coordinating use of Rside    Activities of Daily Living:  · Grooming: supervision sitting EOB for washing face and brushing teeth.   · Lower Body Dressing: maximal assistance donning L sock sitting EOB with LLE supported across knee.   · Sitting EOB , static sitting balance, ranging from min A to SBA.       [facility] 6 Click ADL: 12    Treatment & Education:  Pt oriented to day/date/time and role of OT during hospital stay. Patient education provided on fall prevention during ADLs and being OOB with assistance from staff.   Continued education, patient/ family training recommended.  Patient alert and not able to state current location, able to select "hospital" when given choices. able to follow 4/4 one step commands.  Patient attentive and interactive throughout the session.  Pt completed bed mobility with education provided on awareness of Rside. Pt demonstrated heel hook of Rside during sit>supine. Vitals monitored throughout session. White board updated in patient's room.  OT asked if there were any other questions; patient/ family had no further questions. Educated RN on pt's functional assistance level.  Patient's functional status and disposition recommendation discussed with patient's team.     Patient left supine with all lines intact, call button in reach, bed alarm on and RN and wife presentEducation:      GOALS:   Multidisciplinary Problems     " Occupational Therapy Goals        Problem: Occupational Therapy Goal    Goal Priority Disciplines Outcome Interventions   Occupational Therapy Goal     OT, PT/OT     Description:  Goals set 7/15 to be met by 7/29   Patient will increase functional independence with ADLs by performing:    UE Dressing with Minimal Assistance with hemiplegic technique.  LE Dressing with Minimal Assistance.  Grooming while standing with Contact Guard Assistance.   Toileting from bedside commode with Minimal Assistance for hygiene and clothing management.   Rolling to Left with Contact Guard Assistance.   Rolling to Right with Supervision.   Stand pivot transfers with Contact Guard Assistance.  Patient/Caregivers will be independent in assisting in bed mobility/positioning  Patient/Caregivers will be independent in HEP for weightbearing with RUE, PROM of RUE.                        Time Tracking:     OT Date of Treatment: 07/19/19  OT Start Time: 0738  OT Stop Time: 0805  OT Total Time (min): 27 min    Billable Minutes:Self Care/Home Management 17  Neuromuscular Re-education 10    Yesenia Hernandez, МАРИЯ  7/19/2019

## 2019-07-19 NOTE — SUBJECTIVE & OBJECTIVE
Interval History: Patient's platelet count dropped below 100 while on heparin today.  D/c heparin and ordered fibrinogen and adrian panel.     Review of Systems:  Unable to assess due to expressive aphasia    Vitals:   Temp: 99 °F (37.2 °C)  Pulse: 62  Rhythm: normal sinus rhythm  BP: 124/82  MAP (mmHg): 89  Resp: (!) 24  SpO2: 99 %  O2 Device (Oxygen Therapy): room air    Temp  Min: 98.4 °F (36.9 °C)  Max: 99.8 °F (37.7 °C)  Pulse  Min: 44  Max: 85  BP  Min: 112/71  Max: 157/84  MAP (mmHg)  Min: 84  Max: 116  Resp  Min: 15  Max: 37  SpO2  Min: 96 %  Max: 100 %    07/18 0701 - 07/19 0700  In: 2550.8 [P.O.:300; I.V.:2050.8]  Out: 925 [Urine:925]   Unmeasured Output  Urine Occurrence: 1  Stool Occurrence: 0  Emesis Occurrence: 1     Examination:   Constitutional: Well-nourished and -developed. No apparent distress.   Eyes: Conjunctiva clear, anicteric. Lids no lesions.  Head/Ears/Nose/Mouth/Throat/Neck: Moist mucous membranes. External ears, nose atraumatic.   Cardiovascular: Regular rhythm. No murmurs.+1 edema in distal extrem.  Respiratory: Comfortable respirations. Rales noted on exam BL.  Gastrointestinal: No hernia. Soft, nondistended, nontender. + bowel sounds.    Neurologic:  -GCS [id number]  -Alert. Oriented to person, place, and time. Speech dysarthric; mixed picture of aphasia. Follows commands.  -Cranial nerves II-XII grossly intact. Right sided facial droop  -Full strength in left upper and lower extrem. Continued hemiparesis on his right side.   -Full sensation on right side. Decreased sensation to soft touch on the left upper and lower extrem.     Medications:   Continuous Scheduled    atorvastatin 40 mg Daily   folic acid 1 mg Daily   senna-docusate 8.6-50 mg 1 tablet BID   thiamine 100 mg Daily   PRN    acetaminophen 650 mg Q6H PRN   atropine 1 mg Q2H PRN   magnesium oxide 800 mg PRN   magnesium oxide 800 mg PRN   ondansetron 8 mg Q8H PRN   potassium chloride 10% 40 mEq PRN   potassium chloride 10% 40 mEq  PRN   potassium chloride 10% 40 mEq PRN   potassium, sodium phosphates 2 packet PRN   potassium, sodium phosphates 2 packet PRN   potassium, sodium phosphates 2 packet PRN   sodium chloride 0.9% 10 mL PRN      Today I independently reviewed pertinent medications, lines/drains/airways, imaging, cardiology results, laboratory results,      ISTAT: No results for input(s): PH, PCO2, PO2, POCSATURATED, HCO3, BE, POCNA, POCK, POCTCO2, POCGLU, POCICA, POCLAC, SAMPLE in the last 24 hours.   Chem:   Recent Labs   Lab 07/19/19  0021  07/19/19  0818      < > 145   K 3.3*  --   --    *  --   --    CO2 23  --   --    *  --   --    BUN 6  --   --    CREATININE 0.8  --   --    ESTGFRAFRICA >60.0  --   --    EGFRNONAA >60.0  --   --    CALCIUM 7.8*  --   --    MG 1.4*  --   --    PHOS 3.2  --   --    ANIONGAP 7*  --   --    PROT 4.9*  --   --    ALBUMIN 2.2*  --   --    BILITOT 0.5  --   --    ALKPHOS 50*  --   --    AST 16  --   --    ALT 14  --   --     < > = values in this interval not displayed.     Heme:   Recent Labs   Lab 07/19/19  0021 07/19/19  1034   WBC 7.94  --    HGB 10.5*  --    HCT 32.8*  --    PLT 85*  --    FIBRINOGEN  --  370*     Endo:   Recent Labs   Lab 07/18/19  1837 07/19/19  0015 07/19/19  0610   POCTGLUCOSE 79 117* 98        Diet  Diet Adult Regular (IDDSI Level 7)  Diet Adult Regular (IDDSI Level 7)    Subjective:         HPI, Past Medical, Family, and Social History remains the same as documented in the initial encounter.     Review of Systems  Scheduled Meds:   atorvastatin  40 mg Oral Daily    folic acid  1 mg Oral Daily    senna-docusate 8.6-50 mg  1 tablet Oral BID    thiamine  100 mg Oral Daily     Continuous Infusions:  PRN Meds:acetaminophen, atropine, magnesium oxide, magnesium oxide, ondansetron, potassium chloride 10%, potassium chloride 10%, potassium chloride 10%, potassium, sodium phosphates, potassium, sodium phosphates, potassium, sodium phosphates, sodium chloride  0.9%    Objective:     Vital Signs (Most Recent):  Temp: 99 °F (37.2 °C) (07/19/19 1505)  Pulse: 62 (07/19/19 1600)  Resp: (!) 24 (07/19/19 1600)  BP: 124/82 (07/19/19 1600)  SpO2: 99 % (07/19/19 1600)  BP Location: Right arm    Vital Signs Range (Last 24H):  Temp:  [98.4 °F (36.9 °C)-99.8 °F (37.7 °C)]   Pulse:  [44-85]   Resp:  [15-37]   BP: (112-157)/(65-93)   SpO2:  [96 %-100 %]   Arterial Line BP: (109-149)/(62-84)   BP Location: Right arm        Laboratory:  CMP:   Recent Labs   Lab 07/19/19 0021 07/19/19 0818   CALCIUM 7.8*  --   --    ALBUMIN 2.2*  --   --    PROT 4.9*  --   --       < > 145   K 3.3*  --   --    CO2 23  --   --    *  --   --    BUN 6  --   --    CREATININE 0.8  --   --    ALKPHOS 50*  --   --    ALT 14  --   --    AST 16  --   --    BILITOT 0.5  --   --     < > = values in this interval not displayed.     BMP:   Recent Labs   Lab 07/19/19 0021 07/19/19 0818      < > 145   K 3.3*  --   --    *  --   --    CO2 23  --   --    BUN 6  --   --    CREATININE 0.8  --   --    CALCIUM 7.8*  --   --     < > = values in this interval not displayed.     CBC:   Recent Labs   Lab 07/19/19 0021   WBC 7.94   RBC 3.26*   HGB 10.5*   HCT 32.8*   PLT 85*   *   MCH 32.2*   MCHC 32.0     Lipid Panel:   Recent Labs   Lab 07/14/19  0456   CHOL 159   LDLCALC 91.6   HDL 58   TRIG 47     Coagulation:   Recent Labs   Lab 07/15/19  1005   INR 1.1   APTT 22.9     Hgb A1C:   Recent Labs   Lab 07/14/19  0300   HGBA1C 5.1     TSH:   Recent Labs   Lab 07/14/19  0300   TSH 0.527

## 2019-07-19 NOTE — SUBJECTIVE & OBJECTIVE
Interval History: Patient's platelet count dropped below 100 while on heparin today.  D/c heparin and ordered fibrinogen and adrian panel.  Patient continues to show fluid on CXR as well as swelling in distal extremities. D/c 2%HTS and started lasix 20 mg IV.  Patient and family queired and denied any other complaints.     Review of Systems:  Unable to assess due to expressive aphasia    Vitals:   Temp: 99 °F (37.2 °C)  Pulse: (!) 51  Rhythm: normal sinus rhythm  BP: 120/68  MAP (mmHg): 89  Resp: 20  SpO2: 100 %  O2 Device (Oxygen Therapy): room air    Temp  Min: 98.4 °F (36.9 °C)  Max: 100.3 °F (37.9 °C)  Pulse  Min: 44  Max: 85  BP  Min: 112/71  Max: 157/84  MAP (mmHg)  Min: 84  Max: 116  Resp  Min: 15  Max: 37  SpO2  Min: 96 %  Max: 100 %    07/18 0701 - 07/19 0700  In: 2550.8 [P.O.:300; I.V.:2050.8]  Out: 925 [Urine:925]   Unmeasured Output  Urine Occurrence: 1  Stool Occurrence: 0  Emesis Occurrence: 1     Examination:   Constitutional: Well-nourished and -developed. No apparent distress.   Eyes: Conjunctiva clear, anicteric. Lids no lesions.  Head/Ears/Nose/Mouth/Throat/Neck: Moist mucous membranes. External ears, nose atraumatic.   Cardiovascular: Regular rhythm. No murmurs.+1 edema in distal extrem.  Respiratory: Comfortable respirations. Rales noted on exam BL.  Gastrointestinal: No hernia. Soft, nondistended, nontender. + bowel sounds.    Neurologic:  -GCS E4V5M6  -Alert. Oriented to person, place, and time. Speech dysarthric; mixed picture of aphasia. Follows commands.  -Cranial nerves II-XII grossly intact. Right sided facial droop  -Full strength in left upper and lower extrem. Continued hemiparesis on his right side.   -Full sensation on right side. Decreased sensation to soft touch on the left upper and lower extrem.     Medications:   Continuous Scheduled  atorvastatin 40 mg Daily   folic acid 1 mg Daily   senna-docusate 8.6-50 mg 1 tablet BID   thiamine 100 mg Daily   PRN  acetaminophen 650 mg Q6H PRN    atropine 1 mg Q2H PRN   magnesium oxide 800 mg PRN   magnesium oxide 800 mg PRN   ondansetron 8 mg Q8H PRN   potassium chloride 10% 40 mEq PRN   potassium chloride 10% 40 mEq PRN   potassium chloride 10% 40 mEq PRN   potassium, sodium phosphates 2 packet PRN   potassium, sodium phosphates 2 packet PRN   potassium, sodium phosphates 2 packet PRN   sodium chloride 0.9% 10 mL PRN      Today I independently reviewed pertinent medications, lines/drains/airways, imaging, cardiology results, laboratory results,      ISTAT: No results for input(s): PH, PCO2, PO2, POCSATURATED, HCO3, BE, POCNA, POCK, POCTCO2, POCGLU, POCICA, POCLAC, SAMPLE in the last 24 hours.   Chem: Recent Labs   Lab 07/19/19  0021 07/19/19  0818      < > 145   K 3.3*  --   --    *  --   --    CO2 23  --   --    *  --   --    BUN 6  --   --    CREATININE 0.8  --   --    ESTGFRAFRICA >60.0  --   --    EGFRNONAA >60.0  --   --    CALCIUM 7.8*  --   --    MG 1.4*  --   --    PHOS 3.2  --   --    ANIONGAP 7*  --   --    PROT 4.9*  --   --    ALBUMIN 2.2*  --   --    BILITOT 0.5  --   --    ALKPHOS 50*  --   --    AST 16  --   --    ALT 14  --   --     < > = values in this interval not displayed.     Heme: Recent Labs   Lab 07/19/19  0021 07/19/19  1034   WBC 7.94  --    HGB 10.5*  --    HCT 32.8*  --    PLT 85*  --    FIBRINOGEN  --  370*     Endo:   Recent Labs   Lab 07/18/19  1837 07/19/19  0015 07/19/19  0610   POCTGLUCOSE 79 117* 98        Diet  Diet Adult Regular (IDDSI Level 7)  Diet Adult Regular (IDDSI Level 7)

## 2019-07-19 NOTE — PLAN OF CARE
Problem: Adult Inpatient Plan of Care  Goal: Plan of Care Review  POC reviewed with pt and family at 1400. Pt verbalized understanding. Questions and concerns addressed. No acute events today. 2% stopped. Pt progressing toward goals. Will continue to monitor. See flowsheets for full assessment and VS info.

## 2019-07-19 NOTE — PROGRESS NOTES
"[facility]  Adult Nutrition  Progress Note    SUMMARY       Recommendations    Recommendation/Intervention:   Continue Regular diet.     RD to monitor.    Goals: Pt to meet % EEN and EPN by RD follow-up.   Nutrition Goal Status: goal met  Communication of RD Recs: other (comment)(POC)    Reason for Assessment    Reason For Assessment: RD follow-up  Diagnosis: stroke/CVA  Relevant Medical History: ETOH abuse  Interdisciplinary Rounds: attended  General Information Comments: s/p ABDIAS today. Pt's diet advanced, report awaiting meal. Pt and family report adequate po intake when diet advanced. No weight loss PTA, no indications of malnutritionat this time.  Nutrition Discharge Planning: Adequate PO intake for optimal nutrition.     Nutrition Risk Screen    Nutrition Risk Screen: dysphagia or difficulty swallowing    Nutrition/Diet History    Spiritual, Cultural Beliefs, Scientologist Practices, Values that Affect Care: no  Factors Affecting Nutritional Intake: None identified at this time    Anthropometrics    Temp: 99 °F (37.2 °C)  Height: 6' 4" (193 cm)  Height (inches): 76 in  Weight Method: Bed Scale  Weight: 89.4 kg (197 lb)  Weight (lb): 197 lb  Ideal Body Weight (IBW), Male: 202 lb  % Ideal Body Weight, Male (lb): 97.52 lb  BMI (Calculated): 24  BMI Grade: 18.5-24.9 - normal       Lab/Procedures/Meds    Pertinent Labs Reviewed: reviewed  Pertinent Medications Reviewed: reviewed  Pertinent Medications Comments: folic acid, thiamine    Estimated/Assessed Needs    Weight Used For Calorie Calculations: 89.4 kg (197 lb 1.5 oz)  Energy Calorie Requirements (kcal): 2318  Energy Need Method: Pershing-St Jeor(1.25 PAL)  Protein Requirements: 89-107g (1-1.2g/kg)  Weight Used For Protein Calculations: 89.4 kg (197 lb 1.5 oz)  Fluid Requirements (mL): Per MD     RDA Method (mL): 2318         Nutrition Prescription Ordered    Current Diet Order: Regular    Evaluation of Received Nutrient/Fluid Intake    IV " Fluid (mL): 2400  Comments: LBM 7/19  Tolerance: tolerating  % Intake of Estimated Energy Needs: 75 - 100 %  % Meal Intake: 75 - 100 %    Nutrition Risk    Level of Risk/Frequency of Follow-up: low(f/u 1x/week)     Assessment and Plan    No nutrition diagnosis at this time.    Monitor and Evaluation    Food and Nutrient Intake: energy intake, food and beverage intake  Food and Nutrient Adminstration: diet order  Anthropometric Measurements: weight, weight change  Biochemical Data, Medical Tests and Procedures: other (specify)(All labs)  Nutrition-Focused Physical Findings: overall appearance     Nutrition Follow-Up    RD Follow-up?: Yes

## 2019-07-19 NOTE — ASSESSMENT & PLAN NOTE
- platelet count drop over the last 3 days. Most recently less than 100  - d/c heparin   - JERI antibody panel ordered, pending  - ordered fibrinogen  - continue to monitor   - AC is being held until this is resolved

## 2019-07-19 NOTE — ASSESSMENT & PLAN NOTE
Patient has watch vargas device when he was in . His wife states that he received anticoagulation before the device placement with no reported side effects    Afib is a Stroke risk factor  · Rate control  CHADVASC score: 2  HAS BLED score: 2    TTE (07/14/2019):   Normal left ventricular systolic function. The estimated ejection fraction is 60%  · Normal LV diastolic function.  · Normal right ventricular systolic function.  · Mild tricuspid regurgitation.  · Intermediate central venous pressure (8 mm Hg).  · The estimated PA systolic pressure is 33 mm Hg    ABDIAS held due to thrombocytopenia    Needs anticoagulation - Hold anticoagulation in the setting of thrombocytopenia

## 2019-07-19 NOTE — HOSPITAL COURSE
Pt admitted for L MCA stroke. Plts dropped <100 on 7/19 so ABDIAS was delayed. Plts now 104 and pt is stable.

## 2019-07-19 NOTE — PHYSICIAN QUERY
PT Name: Justyn Rawls  MR #: [id number]     PHYSICIAN QUERY -  ELECTROLYTE CLARIFICATION      CDS: Amber Jesus RN  Contact information: [email or web address]  This form is a permanent document in the medical record.     Query Date: July 19, 2019    By submitting this query, we are merely seeking further clarification of documentation to reflect the severity of illness of your patient. Please utilize your independent clinical judgment when addressing the question(s) below.    The Medical record reflects the following:   Indicators   Supporting Clinical Findings Location in Medical Record   x Lab Value(s) 7/16/2019 00:20  Phosphorus: 1.2     7/18/2019 01:22  Magnesium: 1.4     7/19/2019 00:21  Potassium: 3.3  Labs   x Treatment                            Medication potassium, sodium phosphates 280-160-250 mg packet 2 packet given  7/16 1023, 1405, 1801, 2210  magnesium sulfate 2g in water 50mL IVPB given  7/19 0351  potassium chloride 10 mEq in 100 mL IVPB given  7/19 0351 MAR    Other       Provider, please specify the diagnosis or diagnoses that correspond(s) to the above indicators. Tae all that apply:    [ x  ] Hypokalemia   [ x  ] Hypomagnesemia   [ x  ] Hypophosphatemia   [   ] Other electrolyte disturbance (please specify): _______   [   ]  Clinically Undetermined       Please document in your progress notes daily for the duration of treatment until resolved, and include in your discharge summary.

## 2019-07-19 NOTE — PROGRESS NOTES
[facility]  Vascular Neurology  Comprehensive Stroke Center  Progress Note    Assessment/Plan:     * Stroke due to embolism of left middle cerebral artery  50 y/o male with L MCA stroke due to M1 occlusion received IV TPA and thrombectomy to TICI 2C, probable due to afib    Antithrombotics: Need anticoagulation after TPA window    Statins: Lipitor 40 mg daily    Aggressive risk factor modification: A-Fib, alcohol     Rehab efforts: The patient has been evaluated by a stroke team provider and the therapy needs have been fully considered based off the presenting complaints and exam findings. The following therapy evaluations are needed: PT evaluate and treat, OT evaluate and treat, SLP evaluate and treat, PM&R evaluate for appropriate placement    Diagnostics ordered/pending: HgbA1C to assess blood glucose levels, Lipid Profile to assess cholesterol levels, MRI head without contrast to assess brain parenchyma, TTE to assess cardiac function/status     VTE prophylaxis: SCD's raquel began Heparin 5000 units sc Q8H on 7-15-10 at 0600 but held (07/19/2019) due to thrombocytopenia     BP parameters: Infarct: Post sucessful thrombectomy, SBP <140        Thrombocytopenia  Patient presents with normal PLTs count and dropped while hospitalization could be heparin related  Workup thrombocytopenia as per primary team     Aphasia  Due to stroke  Aggressive therapy    Hemiparesis, right  Due to stroke  Aggressive therapy    S/P admn tPA in diff fac w/n last 24 hr bef adm to crnt fac  Close monitoring in NCC 24 hours post administration         PAF (paroxysmal atrial fibrillation)  Patient has watch vargas device when he was in . His wife states that he received anticoagulation before the device placement with no reported side effects    Afib is a Stroke risk factor  · Rate control  CHADVASC score: 2  HAS BLED score: 2    TTE (07/14/2019):   Normal left ventricular systolic function. The estimated ejection  fraction is 60%  · Normal LV diastolic function.  · Normal right ventricular systolic function.  · Mild tricuspid regurgitation.  · Intermediate central venous pressure (8 mm Hg).  · The estimated PA systolic pressure is 33 mm Hg    ABDIAS held due to thrombocytopenia    Needs anticoagulation - Hold anticoagulation in the setting of thrombocytopenia          7/14 admit to [facility] L MCA embolic stroke, s/p tpa, s/p thrombectomy L M1 multiple clots removed. ETOH use, monitor for withdrawals.  7/15 scheduled MRI wo contrast for tomorrow morning with planning for chemical DVT ppx. Continued 2% hypertonic saline and mannitol.   7/16 MRI wo contrast currently postponed, awaiting clarification that atrial device is safe to scan.  No acute events today. Continued 2% saline and mannitol.   7/17 MRI wo contrast preformed today, awaiting results. Plans to start anticoagulation following MRI per stroke team.   7/18 Cardiology consulted today for recs on whether to start AC or preform a ABDIAS to check potential thrombi on watchman.   7/19 Platelet count decreased below 100 while Heparin. D/c heparin and hold on any AC.  Gave lasix IV 20 to help with fluid overload. D/c 2%HTS    STROKE DOCUMENTATION   Acute Stroke Times   Last Known Normal Date: 07/13/19  Last Known Normal Time: 2130  Symptom Onset Date: 07/13/19  Symptom Onset Time: 2130  Stroke Team Called Date: 07/13/19  Stroke Team Called Time: 2245  Stroke Team Arrival Date: 07/14/19  Stroke Team Arrival Time: 0107  CT Interpretation Time: 2251  Decision to Treat Time for Alteplase: 2307(bolus given)  Decision to Treat Time for IR: 0107    NIH Scale:  1a. Level of Consciousness: 0-->Alert, keenly responsive  1b. LOC Questions: 1-->Answers one question correctly  1c. LOC Commands: 0-->Performs both tasks correctly  2. Best Gaze: 0-->Normal  3. Visual: 1-->Partial hemianopia  4. Facial Palsy: 2-->Partial paralysis (total or near-total paralysis of lower face)  5a. Motor Arm, Left:  0-->No drift, limb holds 90 (or 45) degrees for full 10 secs  5b. Motor Arm, Right: 3-->No effort against gravity, limb falls  6a. Motor Leg, Left: 0-->No drift, leg holds 30 degree position for full 5 secs  6b. Motor Leg, Right: 2-->Some effort against gravity, leg falls to bed by 5 secs, but has some effort against gravity  7. Limb Ataxia: 0-->Absent  8. Sensory: 0-->Normal, no sensory loss  9. Best Language: 1-->Mild-to-moderate aphasia, some obvious loss of fluency or facility of comprehension, without significant limitation on ideas expressed or form of expression. Reduction of speech and/or comprehension, however, makes conversation. . . (see row details)  10. Dysarthria: 1-->Mild-to-moderate dysarthria, patient slurs at least some words and, at worst, can be understood with some difficulty  11. Extinction and Inattention (formerly Neglect): 0-->No abnormality  Total (NIH Stroke Scale): 11       Modified [place] Score: 0  [place] Coma Scale:14   ABCD2 Score:    [id number] Score:2  HAS -BLED Score:2  ICH Score:   Hunt & Ta Classification:      Hemorrhagic change of an Ischemic Stroke: Does this patient have an ischemic stroke with hemorrhagic changes? No     Interval History: Patient's platelet count dropped below 100 while on heparin today.  D/c heparin and ordered fibrinogen and adrian panel.     Review of Systems:  Unable to assess due to expressive aphasia    Vitals:   Temp: 99 °F (37.2 °C)  Pulse: 62  Rhythm: normal sinus rhythm  BP: 124/82  MAP (mmHg): 89  Resp: (!) 24  SpO2: 99 %  O2 Device (Oxygen Therapy): room air    Temp  Min: 98.4 °F (36.9 °C)  Max: 99.8 °F (37.7 °C)  Pulse  Min: 44  Max: 85  BP  Min: 112/71  Max: 157/84  MAP (mmHg)  Min: 84  Max: 116  Resp  Min: 15  Max: 37  SpO2  Min: 96 %  Max: 100 %    07/18 0701 - 07/19 0700  In: 2550.8 [P.O.:300; I.V.:2050.8]  Out: 925 [Urine:925]   Unmeasured Output  Urine Occurrence: 1  Stool Occurrence: 0  Emesis Occurrence: 1     Examination:    Constitutional: Well-nourished and -developed. No apparent distress.   Eyes: Conjunctiva clear, anicteric. Lids no lesions.  Head/Ears/Nose/Mouth/Throat/Neck: Moist mucous membranes. External ears, nose atraumatic.   Cardiovascular: Regular rhythm. No murmurs.+1 edema in distal extrem.  Respiratory: Comfortable respirations. Rales noted on exam BL.  Gastrointestinal: No hernia. Soft, nondistended, nontender. + bowel sounds.    Neurologic:  -GCS [id number]  -Alert. Oriented to person, place, and time. Speech dysarthric; mixed picture of aphasia. Follows commands.  -Cranial nerves II-XII grossly intact. Right sided facial droop  -Full strength in left upper and lower extrem. Continued hemiparesis on his right side.   -Full sensation on right side. Decreased sensation to soft touch on the left upper and lower extrem.     Medications:   Continuous Scheduled    atorvastatin 40 mg Daily   folic acid 1 mg Daily   senna-docusate 8.6-50 mg 1 tablet BID   thiamine 100 mg Daily   PRN    acetaminophen 650 mg Q6H PRN   atropine 1 mg Q2H PRN   magnesium oxide 800 mg PRN   magnesium oxide 800 mg PRN   ondansetron 8 mg Q8H PRN   potassium chloride 10% 40 mEq PRN   potassium chloride 10% 40 mEq PRN   potassium chloride 10% 40 mEq PRN   potassium, sodium phosphates 2 packet PRN   potassium, sodium phosphates 2 packet PRN   potassium, sodium phosphates 2 packet PRN   sodium chloride 0.9% 10 mL PRN      Today I independently reviewed pertinent medications, lines/drains/airways, imaging, cardiology results, laboratory results,      ISTAT: No results for input(s): PH, PCO2, PO2, POCSATURATED, HCO3, BE, POCNA, POCK, POCTCO2, POCGLU, POCICA, POCLAC, SAMPLE in the last 24 hours.   Chem:   Recent Labs   Lab 07/19/19  0021  07/19/19  0818      < > 145   K 3.3*  --   --    *  --   --    CO2 23  --   --    *  --   --    BUN 6  --   --    CREATININE 0.8  --   --    ESTGFRAFRICA >60.0  --   --    EGFRNONAA >60.0  --   --     CALCIUM 7.8*  --   --    MG 1.4*  --   --    PHOS 3.2  --   --    ANIONGAP 7*  --   --    PROT 4.9*  --   --    ALBUMIN 2.2*  --   --    BILITOT 0.5  --   --    ALKPHOS 50*  --   --    AST 16  --   --    ALT 14  --   --     < > = values in this interval not displayed.     Heme:   Recent Labs   Lab 07/19/19  0021 07/19/19  1034   WBC 7.94  --    HGB 10.5*  --    HCT 32.8*  --    PLT 85*  --    FIBRINOGEN  --  370*     Endo:   Recent Labs   Lab 07/18/19  1837 07/19/19  0015 07/19/19  0610   POCTGLUCOSE 79 117* 98        Diet  Diet Adult Regular (IDDSI Level 7)  Diet Adult Regular (IDDSI Level 7)    Subjective:         HPI, Past Medical, Family, and Social History remains the same as documented in the initial encounter.     Review of Systems  Scheduled Meds:   atorvastatin  40 mg Oral Daily    folic acid  1 mg Oral Daily    senna-docusate 8.6-50 mg  1 tablet Oral BID    thiamine  100 mg Oral Daily     Continuous Infusions:  PRN Meds:acetaminophen, atropine, magnesium oxide, magnesium oxide, ondansetron, potassium chloride 10%, potassium chloride 10%, potassium chloride 10%, potassium, sodium phosphates, potassium, sodium phosphates, potassium, sodium phosphates, sodium chloride 0.9%    Objective:     Vital Signs (Most Recent):  Temp: 99 °F (37.2 °C) (07/19/19 1505)  Pulse: 62 (07/19/19 1600)  Resp: (!) 24 (07/19/19 1600)  BP: 124/82 (07/19/19 1600)  SpO2: 99 % (07/19/19 1600)  BP Location: Right arm    Vital Signs Range (Last 24H):  Temp:  [98.4 °F (36.9 °C)-99.8 °F (37.7 °C)]   Pulse:  [44-85]   Resp:  [15-37]   BP: (112-157)/(65-93)   SpO2:  [96 %-100 %]   Arterial Line BP: (109-149)/(62-84)   BP Location: Right arm        Laboratory:  CMP:   Recent Labs   Lab 07/19/19  0021  07/19/19  0818   CALCIUM 7.8*  --   --    ALBUMIN 2.2*  --   --    PROT 4.9*  --   --       < > 145   K 3.3*  --   --    CO2 23  --   --    *  --   --    BUN 6  --   --    CREATININE 0.8  --   --    ALKPHOS 50*  --   --    ALT  14  --   --    AST 16  --   --    BILITOT 0.5  --   --     < > = values in this interval not displayed.     BMP:   Recent Labs   Lab 07/19/19  0021  07/19/19  0818      < > 145   K 3.3*  --   --    *  --   --    CO2 23  --   --    BUN 6  --   --    CREATININE 0.8  --   --    CALCIUM 7.8*  --   --     < > = values in this interval not displayed.     CBC:   Recent Labs   Lab 07/19/19  0021   WBC 7.94   RBC 3.26*   HGB 10.5*   HCT 32.8*   PLT 85*   *   MCH 32.2*   MCHC 32.0     Lipid Panel:   Recent Labs   Lab 07/14/19  0456   CHOL 159   LDLCALC 91.6   HDL 58   TRIG 47     Coagulation:   Recent Labs   Lab 07/15/19  1005   INR 1.1   APTT 22.9     Hgb A1C:   Recent Labs   Lab 07/14/19  0300   HGBA1C 5.1     TSH:   Recent Labs   Lab 07/14/19  0300   TSH 0.527             Zainab Caceres MD  Comprehensive Stroke Center  Department of Vascular Neurology   [facility]
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
## 2019-07-19 NOTE — PROGRESS NOTES
[facility]  Cardiology  Progress Note    Patient Name: Justyn Rawls  MRN: [id number]  Admission Date: 7/14/2019  Hospital Length of Stay: 5 days  Code Status: Full Code   Attending Physician: Devon Dawkins MD   Primary Care Physician: No primary care provider on file.  Expected Discharge Date:   Principal Problem:Stroke due to embolism of left middle cerebral artery    Subjective:     Hospital Course:   Pt admitted for L MCA stroke. Pt had right sided facial drooping, expressive aphasia,         Review of Systems   Reason unable to perform ROS: Pt unable to answer questions because of expressive aphasia. Pt did deny any chest pain or difficulty breathing.      Objective:     Vital Signs (Most Recent):  Temp: 99.8 °F (37.7 °C) (07/19/19 0800)  Pulse: 85 (07/19/19 0800)  Resp: (!) 37 (07/19/19 0800)  BP: 131/82 (07/19/19 0800)  SpO2: (!) 88 % (07/19/19 0800) Vital Signs (24h Range):  Temp:  [98.4 °F (36.9 °C)-100.3 °F (37.9 °C)] 99.8 °F (37.7 °C)  Pulse:  [44-85] 85  Resp:  [18-37] 37  SpO2:  [88 %-100 %] 88 %  BP: (118-157)/(65-93) 131/82  Arterial Line BP: (109-150)/(62-84) 148/82     Weight: 89.4 kg (197 lb)  Body mass index is 23.98 kg/m².     SpO2: (!) 88 %  O2 Device (Oxygen Therapy): room air      Intake/Output Summary (Last 24 hours) at 7/19/2019 0837  Last data filed at 7/19/2019 0800  Gross per 24 hour   Intake 2513.34 ml   Output 925 ml   Net 1588.34 ml       Lines/Drains/Airways     Arterial Line                 Arterial Line 07/15/19 0218 Right Radial 4 days          Peripheral Intravenous Line                 Peripheral IV - Single Lumen 07/17/19 1406 20 G Left Antecubital 1 day         Peripheral IV - Single Lumen 07/17/19 1833 Anterior;Right Foot 1 day                Physical Exam   Constitutional: He appears well-developed and well-nourished.   HENT:   Head: Normocephalic and atraumatic.   Right sided facial drooping   Eyes:   EOM abnormal- unable to look in different  directions without turning head   Neck: Normal range of motion. Neck supple. No JVD present.   Cardiovascular: Normal rate. Exam reveals no gallop and no friction rub.   No murmur heard.  No carotid bruits   Pulmonary/Chest: Effort normal. No respiratory distress. He exhibits no tenderness.   Decreased breath sounds in LL b/l   Abdominal: Soft. Bowel sounds are normal. He exhibits no distension. There is tenderness.   Diffuse abdominal tenderness to palpation   Musculoskeletal: He exhibits edema. He exhibits no tenderness or deformity.   2+ nonpitting edema in RUE and RLE   Lymphadenopathy:     He has no cervical adenopathy.   Neurological: A cranial nerve deficit is present. Coordination abnormal.   Pt has no sensation or movement in RUE or RLE.  Can move LUE but has poor motor control   Can move LLE but cannot follow commands like wiggling toes  Pt has expressive aphasia   Skin: Skin is warm and dry. No rash noted. No erythema.         Assessment and Plan:       * Stroke due to embolism of left middle cerebral artery  Pt is 48yo M w/ PMH afib s/p watchman in 2017 who presented w/ stroke d/t L MCA embolism s/p tPA.     Plan:  - F/u Carotid U/S to evaluate for possible source of emboli  - F/u ABDIAS with bubble study to assess watchman placement, evaluate for thrombus and evaluate for patent ASD- delayed because of thrombocytopenia (Plt 85)  - Continue holding anticoagulation  - Suggest antiplatelet therapy per guidelines (ASA monotherapy since pt had major stroke)- consider holding this until Plts are increased          VTE Risk Mitigation (From admission, onward)        Ordered     IP VTE LOW RISK PATIENT  Once      07/14/19 0227     Place sequential compression device  Until discontinued      07/14/19 0227          Gris Sandoval MD  Cardiology  [facility]

## 2019-07-20 LAB
ALBUMIN SERPL BCP-MCNC: 2.3 G/DL (ref 3.5–5.2)
ALP SERPL-CCNC: 45 U/L (ref 55–135)
ALT SERPL W/O P-5'-P-CCNC: 15 U/L (ref 10–44)
ANION GAP SERPL CALC-SCNC: 10 MMOL/L (ref 8–16)
AST SERPL-CCNC: 16 U/L (ref 10–40)
BASOPHILS # BLD AUTO: 0.04 K/UL (ref 0–0.2)
BASOPHILS NFR BLD: 0.5 % (ref 0–1.9)
BILIRUB SERPL-MCNC: 0.5 MG/DL (ref 0.1–1)
BUN SERPL-MCNC: 8 MG/DL (ref 6–20)
CALCIUM SERPL-MCNC: 7.9 MG/DL (ref 8.7–10.5)
CHLORIDE SERPL-SCNC: 109 MMOL/L (ref 95–110)
CO2 SERPL-SCNC: 23 MMOL/L (ref 23–29)
CREAT SERPL-MCNC: 0.9 MG/DL (ref 0.5–1.4)
DIFFERENTIAL METHOD: ABNORMAL
EOSINOPHIL # BLD AUTO: 0.5 K/UL (ref 0–0.5)
EOSINOPHIL NFR BLD: 6.1 % (ref 0–8)
ERYTHROCYTE [DISTWIDTH] IN BLOOD BY AUTOMATED COUNT: 13.1 % (ref 11.5–14.5)
EST. GFR  (AFRICAN AMERICAN): >60 ML/MIN/1.73 M^2
EST. GFR  (NON AFRICAN AMERICAN): >60 ML/MIN/1.73 M^2
GLUCOSE SERPL-MCNC: 94 MG/DL (ref 70–110)
HCT VFR BLD AUTO: 33 % (ref 40–54)
HGB BLD-MCNC: 10.8 G/DL (ref 14–18)
IMM GRANULOCYTES # BLD AUTO: 0.04 K/UL (ref 0–0.04)
IMM GRANULOCYTES NFR BLD AUTO: 0.5 % (ref 0–0.5)
INR PPP: 1.1 (ref 0.8–1.2)
LYMPHOCYTES # BLD AUTO: 1.3 K/UL (ref 1–4.8)
LYMPHOCYTES NFR BLD: 17.2 % (ref 18–48)
MAGNESIUM SERPL-MCNC: 1.8 MG/DL (ref 1.6–2.6)
MCH RBC QN AUTO: 32.8 PG (ref 27–31)
MCHC RBC AUTO-ENTMCNC: 32.7 G/DL (ref 32–36)
MCV RBC AUTO: 100 FL (ref 82–98)
MONOCYTES # BLD AUTO: 0.8 K/UL (ref 0.3–1)
MONOCYTES NFR BLD: 10.4 % (ref 4–15)
NEUTROPHILS # BLD AUTO: 4.8 K/UL (ref 1.8–7.7)
NEUTROPHILS NFR BLD: 65.3 % (ref 38–73)
NRBC BLD-RTO: 0 /100 WBC
PHOSPHATE SERPL-MCNC: 4 MG/DL (ref 2.7–4.5)
PLATELET # BLD AUTO: 83 K/UL (ref 150–350)
PMV BLD AUTO: 11.5 FL (ref 9.2–12.9)
POCT GLUCOSE: 114 MG/DL (ref 70–110)
POCT GLUCOSE: 94 MG/DL (ref 70–110)
POTASSIUM SERPL-SCNC: 3.6 MMOL/L (ref 3.5–5.1)
PROT SERPL-MCNC: 5 G/DL (ref 6–8.4)
PROTHROMBIN TIME: 11.7 SEC (ref 9–12.5)
RBC # BLD AUTO: 3.29 M/UL (ref 4.6–6.2)
SODIUM SERPL-SCNC: 142 MMOL/L (ref 136–145)
WBC # BLD AUTO: 7.33 K/UL (ref 3.9–12.7)

## 2019-07-20 PROCEDURE — 20000000 HC ICU ROOM

## 2019-07-20 PROCEDURE — 99233 SBSQ HOSP IP/OBS HIGH 50: CPT | Mod: ,,, | Performed by: PSYCHIATRY & NEUROLOGY

## 2019-07-20 PROCEDURE — 25000003 PHARM REV CODE 250: Performed by: PHYSICIAN ASSISTANT

## 2019-07-20 PROCEDURE — 99233 PR SUBSEQUENT HOSPITAL CARE,LEVL III: ICD-10-PCS | Mod: ,,, | Performed by: PSYCHIATRY & NEUROLOGY

## 2019-07-20 PROCEDURE — 83735 ASSAY OF MAGNESIUM: CPT

## 2019-07-20 PROCEDURE — 85025 COMPLETE CBC W/AUTO DIFF WBC: CPT

## 2019-07-20 PROCEDURE — 80053 COMPREHEN METABOLIC PANEL: CPT

## 2019-07-20 PROCEDURE — 25000003 PHARM REV CODE 250: Performed by: STUDENT IN AN ORGANIZED HEALTH CARE EDUCATION/TRAINING PROGRAM

## 2019-07-20 PROCEDURE — 94761 N-INVAS EAR/PLS OXIMETRY MLT: CPT

## 2019-07-20 PROCEDURE — 84100 ASSAY OF PHOSPHORUS: CPT

## 2019-07-20 PROCEDURE — 85610 PROTHROMBIN TIME: CPT

## 2019-07-20 PROCEDURE — 25000003 PHARM REV CODE 250: Performed by: NURSE PRACTITIONER

## 2019-07-20 RX ADMIN — Medication 800 MG: at 08:07

## 2019-07-20 RX ADMIN — ACETAMINOPHEN 650 MG: 325 TABLET ORAL at 08:07

## 2019-07-20 RX ADMIN — POTASSIUM CHLORIDE 40 MEQ: 20 SOLUTION ORAL at 05:07

## 2019-07-20 RX ADMIN — ATORVASTATIN CALCIUM 40 MG: 20 TABLET, FILM COATED ORAL at 08:07

## 2019-07-20 RX ADMIN — Medication 800 MG: at 05:07

## 2019-07-20 RX ADMIN — ACETAMINOPHEN 650 MG: 325 TABLET ORAL at 02:07

## 2019-07-20 RX ADMIN — Medication 100 MG: at 08:07

## 2019-07-20 RX ADMIN — SENNOSIDES,DOCUSATE SODIUM 1 TABLET: 8.6; 5 TABLET, FILM COATED ORAL at 08:07

## 2019-07-20 RX ADMIN — FOLIC ACID 1 MG: 1 TABLET ORAL at 08:07

## 2019-07-20 NOTE — PLAN OF CARE
Problem: Adult Inpatient Plan of Care  Goal: Plan of Care Review  Outcome: Ongoing (interventions implemented as appropriate)  POC reviewed with pt and family at 1400. Pt gestured understanding d/t expressive aphasia. Questions and concerns addressed with pt and spouse and in agreement with POC. No acute events today. Pt progressing toward goals. Will continue to monitor. See flowsheets for full assessment and VS info.

## 2019-07-20 NOTE — ASSESSMENT & PLAN NOTE
- s/p tPA over 24 hours ago  - 7/14: CTA showed prox L MCA occlusion s/p thrombectomy w/ TICI2c flows post intervention  - 7/17 MRI: Multifocal moderate to large regions of acute/recent infarction primarily left MCA territory.  There is superimposed petechial regions of hemorrhage within the left basal ganglia, insula and frontal and parietal lobes.    - ABDIAS on hold until thrombocytopenia is resolved  - hold anticoagulation until thrombocytopenia is resolved  - Patient's fluid status has improved since stopping HTS.  Leg edema and pleural effusions decreased.   - Daily CXR  - SBP <180  - q1 neurochecks  - echo preformed on 7/14: EF 60% with normal LV systolic/ diastolic function and normal RV systolic function with mild tricuspid regurgitation.  - Restarted regular diet   - PT/OT/ST

## 2019-07-20 NOTE — SUBJECTIVE & OBJECTIVE
Interval History: Patient's family states he had some difficulty swallowing his meal last night.  Speech therapy is on board and has cleared him for a regular diet. Encouraged family to watch him/ help him during meals. Otherwise, his neruo exam is unchanged.  Obtained coag panel which is WNL, and still waiting for HIT panel to come back. ABDIAS is on hold until thrombocytopenia is resolved.     Review of Systems:    Review of Systems   Constitutional: Negative for chills and fever.   Eyes: Negative for blurred vision and double vision.   Respiratory: Negative for shortness of breath and wheezing.    Cardiovascular: Negative for chest pain, palpitations and leg swelling.   Gastrointestinal: Negative for abdominal pain, nausea and vomiting.   Skin: Negative for rash.   Neurological: Positive for weakness. Negative for sensory change and headaches.     Vitals:   Temp: 98.7 °F (37.1 °C)  Pulse: (!) 46  Rhythm: sinus bradycardia  BP: 127/76  MAP (mmHg): 98  Resp: 20  SpO2: 98 %  O2 Device (Oxygen Therapy): room air    Temp  Min: 97.9 °F (36.6 °C)  Max: 98.7 °F (37.1 °C)  Pulse  Min: 43  Max: 71  BP  Min: 115/72  Max: 155/79  MAP (mmHg)  Min: 89  Max: 120  Resp  Min: 15  Max: 30  SpO2  Min: 94 %  Max: 99 %    07/19 0701 - 07/20 0700  In: 430 [P.O.:10; I.V.:420]  Out: 1700 [Urine:1700]   Unmeasured Output  Urine Occurrence: 1  Stool Occurrence: 0  Emesis Occurrence: 1     Examination:   Constitutional: Well-nourished and -developed. No apparent distress.   Eyes: Conjunctiva clear, anicteric. Lids no lesions.  Head/Ears/Nose/Mouth/Throat/Neck: Moist mucous membranes. External ears, nose atraumatic.   Cardiovascular: Regular rhythm. Leg edema has resolved since yesterday.   Respiratory: Comfortable respirations. Clear to auscultation.  Gastrointestinal: No hernia. Soft, nondistended, nontender. + bowel sounds.    Neurologic:  -GCS E4V5M6  -Alert. Speech is a mixed aphasic picture. Follows commands.  -Cranial nerves II-XII  Iintact. Right sided facial droop that is unchaged from previous exams   -Full strength and movement in RUE/RLE (5/5).    -Full sensation on right side. Decreased sensation to soft touch on the left upper and lower extrem.     Medications:   Continuous Scheduled  atorvastatin 40 mg Daily   folic acid 1 mg Daily   senna-docusate 8.6-50 mg 1 tablet BID   thiamine 100 mg Daily   PRN  acetaminophen 650 mg Q6H PRN   atropine 1 mg Q2H PRN   magnesium oxide 800 mg PRN   magnesium oxide 800 mg PRN   ondansetron 8 mg Q8H PRN   potassium chloride 10% 40 mEq PRN   potassium chloride 10% 40 mEq PRN   potassium chloride 10% 40 mEq PRN   potassium, sodium phosphates 2 packet PRN   potassium, sodium phosphates 2 packet PRN   potassium, sodium phosphates 2 packet PRN   sodium chloride 0.9% 10 mL PRN      Today I independently reviewed pertinent medications, laboratory results,      ISTAT: No results for input(s): PH, PCO2, PO2, POCSATURATED, HCO3, BE, POCNA, POCK, POCTCO2, POCGLU, POCICA, POCLAC, SAMPLE in the last 24 hours.   Chem: Recent Labs   Lab 07/20/19  0120      K 3.6      CO2 23   GLU 94   BUN 8   CREATININE 0.9   ESTGFRAFRICA >60.0   EGFRNONAA >60.0   CALCIUM 7.9*   MG 1.8   PHOS 4.0   ANIONGAP 10   PROT 5.0*   ALBUMIN 2.3*   BILITOT 0.5   ALKPHOS 45*   AST 16   ALT 15     Heme: Recent Labs   Lab 07/20/19  0120 07/20/19  1201   WBC 7.33  --    HGB 10.8*  --    HCT 33.0*  --    PLT 83*  --    INR  --  1.1     Endo:   Recent Labs   Lab 07/20/19  1051   POCTGLUCOSE 94      Diet  Diet Adult Regular (IDDSI Level 7)  Diet Adult Regular (IDDSI Level 7)

## 2019-07-20 NOTE — ASSESSMENT & PLAN NOTE
- s/p JM closure in 2017  - Cardiology consulted, appreciate recs    - Hold off on ABDIAS ordered for now until thrombocytopenia issue is resolved  - hold anticoagulation for now as platelet count has dropped below 100 while on heparin.  - telemetry  - Bradycardia + diaphoresis episode 7/17 at night.  Given Atropine which resolved the episode.  Potential stroke/ cardiac syndrome.  No further episodes since.

## 2019-07-20 NOTE — ASSESSMENT & PLAN NOTE
- JERI antibody panel ordered, pending  - platelet count drop over the last 3 days while on Heparin. Most recently less than 100  - d/c heparin   - PT/INR WNL   - continue to monitor   - AC is being held until this is resolved
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
## 2019-07-20 NOTE — PLAN OF CARE
Problem: Adult Inpatient Plan of Care  Goal: Plan of Care Review  Outcome: Ongoing (interventions implemented as appropriate)  POC reviewed with pt at 0500. Pt unable to verbalized understanding due to disorientation . Questions and concerns addressed. No acute events overnight. Pt progressing toward goals. Will continue to monitor. See flowsheets for full assessment and VS info

## 2019-07-21 PROBLEM — I10 HYPERTENSION: Status: ACTIVE | Noted: 2019-07-21

## 2019-07-21 LAB
ALBUMIN SERPL BCP-MCNC: 2.3 G/DL (ref 3.5–5.2)
ALP SERPL-CCNC: 49 U/L (ref 55–135)
ALT SERPL W/O P-5'-P-CCNC: 17 U/L (ref 10–44)
ANION GAP SERPL CALC-SCNC: 9 MMOL/L (ref 8–16)
AST SERPL-CCNC: 19 U/L (ref 10–40)
BASOPHILS # BLD AUTO: 0.05 K/UL (ref 0–0.2)
BASOPHILS NFR BLD: 0.7 % (ref 0–1.9)
BILIRUB SERPL-MCNC: 0.4 MG/DL (ref 0.1–1)
BUN SERPL-MCNC: 9 MG/DL (ref 6–20)
CALCIUM SERPL-MCNC: 8.2 MG/DL (ref 8.7–10.5)
CHLORIDE SERPL-SCNC: 108 MMOL/L (ref 95–110)
CO2 SERPL-SCNC: 25 MMOL/L (ref 23–29)
CREAT SERPL-MCNC: 0.8 MG/DL (ref 0.5–1.4)
DIFFERENTIAL METHOD: ABNORMAL
EOSINOPHIL # BLD AUTO: 0.5 K/UL (ref 0–0.5)
EOSINOPHIL NFR BLD: 6.7 % (ref 0–8)
ERYTHROCYTE [DISTWIDTH] IN BLOOD BY AUTOMATED COUNT: 13.1 % (ref 11.5–14.5)
EST. GFR  (AFRICAN AMERICAN): >60 ML/MIN/1.73 M^2
EST. GFR  (NON AFRICAN AMERICAN): >60 ML/MIN/1.73 M^2
GLUCOSE SERPL-MCNC: 92 MG/DL (ref 70–110)
HCT VFR BLD AUTO: 32.6 % (ref 40–54)
HGB BLD-MCNC: 11.1 G/DL (ref 14–18)
IMM GRANULOCYTES # BLD AUTO: 0.03 K/UL (ref 0–0.04)
IMM GRANULOCYTES NFR BLD AUTO: 0.4 % (ref 0–0.5)
LYMPHOCYTES # BLD AUTO: 1.4 K/UL (ref 1–4.8)
LYMPHOCYTES NFR BLD: 20.8 % (ref 18–48)
MAGNESIUM SERPL-MCNC: 1.9 MG/DL (ref 1.6–2.6)
MCH RBC QN AUTO: 32.3 PG (ref 27–31)
MCHC RBC AUTO-ENTMCNC: 34 G/DL (ref 32–36)
MCV RBC AUTO: 95 FL (ref 82–98)
MONOCYTES # BLD AUTO: 0.6 K/UL (ref 0.3–1)
MONOCYTES NFR BLD: 9.6 % (ref 4–15)
NEUTROPHILS # BLD AUTO: 4.1 K/UL (ref 1.8–7.7)
NEUTROPHILS NFR BLD: 61.8 % (ref 38–73)
NRBC BLD-RTO: 0 /100 WBC
PHOSPHATE SERPL-MCNC: 4.3 MG/DL (ref 2.7–4.5)
PLATELET # BLD AUTO: 99 K/UL (ref 150–350)
PMV BLD AUTO: 11.3 FL (ref 9.2–12.9)
POTASSIUM SERPL-SCNC: 3.6 MMOL/L (ref 3.5–5.1)
PROT SERPL-MCNC: 5.2 G/DL (ref 6–8.4)
RBC # BLD AUTO: 3.44 M/UL (ref 4.6–6.2)
SODIUM SERPL-SCNC: 142 MMOL/L (ref 136–145)
WBC # BLD AUTO: 6.69 K/UL (ref 3.9–12.7)

## 2019-07-21 PROCEDURE — 99233 PR SUBSEQUENT HOSPITAL CARE,LEVL III: ICD-10-PCS | Mod: ,,, | Performed by: PSYCHIATRY & NEUROLOGY

## 2019-07-21 PROCEDURE — 25000003 PHARM REV CODE 250: Performed by: STUDENT IN AN ORGANIZED HEALTH CARE EDUCATION/TRAINING PROGRAM

## 2019-07-21 PROCEDURE — 99233 SBSQ HOSP IP/OBS HIGH 50: CPT | Mod: ,,, | Performed by: PSYCHIATRY & NEUROLOGY

## 2019-07-21 PROCEDURE — 83735 ASSAY OF MAGNESIUM: CPT

## 2019-07-21 PROCEDURE — 84100 ASSAY OF PHOSPHORUS: CPT

## 2019-07-21 PROCEDURE — 63600175 PHARM REV CODE 636 W HCPCS: Performed by: NURSE PRACTITIONER

## 2019-07-21 PROCEDURE — 85025 COMPLETE CBC W/AUTO DIFF WBC: CPT

## 2019-07-21 PROCEDURE — 25000003 PHARM REV CODE 250: Performed by: NURSE PRACTITIONER

## 2019-07-21 PROCEDURE — 25000003 PHARM REV CODE 250: Performed by: PHYSICIAN ASSISTANT

## 2019-07-21 PROCEDURE — 80053 COMPREHEN METABOLIC PANEL: CPT

## 2019-07-21 PROCEDURE — 94761 N-INVAS EAR/PLS OXIMETRY MLT: CPT

## 2019-07-21 PROCEDURE — 20000000 HC ICU ROOM

## 2019-07-21 RX ORDER — LISINOPRIL 20 MG/1
20 TABLET ORAL DAILY
Status: DISCONTINUED | OUTPATIENT
Start: 2019-07-21 | End: 2019-07-29

## 2019-07-21 RX ORDER — HYDRALAZINE HYDROCHLORIDE 20 MG/ML
10 INJECTION INTRAMUSCULAR; INTRAVENOUS EVERY 4 HOURS PRN
Status: DISCONTINUED | OUTPATIENT
Start: 2019-07-21 | End: 2019-07-29 | Stop reason: HOSPADM

## 2019-07-21 RX ORDER — HYDRALAZINE HYDROCHLORIDE 20 MG/ML
INJECTION INTRAMUSCULAR; INTRAVENOUS
Status: DISPENSED
Start: 2019-07-21 | End: 2019-07-21

## 2019-07-21 RX ORDER — OXYCODONE HYDROCHLORIDE 5 MG/1
5 TABLET ORAL ONCE AS NEEDED
Status: COMPLETED | OUTPATIENT
Start: 2019-07-21 | End: 2019-07-21

## 2019-07-21 RX ADMIN — ACETAMINOPHEN 650 MG: 325 TABLET ORAL at 09:07

## 2019-07-21 RX ADMIN — ACETAMINOPHEN 650 MG: 325 TABLET ORAL at 05:07

## 2019-07-21 RX ADMIN — ONDANSETRON 8 MG: 2 INJECTION INTRAMUSCULAR; INTRAVENOUS at 05:07

## 2019-07-21 RX ADMIN — ONDANSETRON 8 MG: 2 INJECTION INTRAMUSCULAR; INTRAVENOUS at 08:07

## 2019-07-21 RX ADMIN — OXYCODONE HYDROCHLORIDE 5 MG: 5 TABLET ORAL at 10:07

## 2019-07-21 RX ADMIN — LISINOPRIL 20 MG: 20 TABLET ORAL at 10:07

## 2019-07-21 RX ADMIN — POTASSIUM CHLORIDE 40 MEQ: 20 SOLUTION ORAL at 05:07

## 2019-07-21 RX ADMIN — ATORVASTATIN CALCIUM 40 MG: 20 TABLET, FILM COATED ORAL at 08:07

## 2019-07-21 RX ADMIN — FOLIC ACID 1 MG: 1 TABLET ORAL at 08:07

## 2019-07-21 RX ADMIN — Medication 100 MG: at 08:07

## 2019-07-21 NOTE — PROGRESS NOTES
Pt transported to CT via bed on portable monitor. Pt tolerated well.     1115 Pt returned to 9077 following Ct. Placed on bedside monitors, alarms set and audible. NCc, Heather notified of completion of CT
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
## 2019-07-21 NOTE — ASSESSMENT & PLAN NOTE
- Neurological exam is unchanged  - CTH this am with no significant interval changes  - AC plans after ABDIAS and HIT results

## 2019-07-21 NOTE — SUBJECTIVE & OBJECTIVE
Interval History:  >4 elements OR status of 3 inpatient conditions    Review of Systems   Constitutional: Positive for activity change.   HENT: Negative.    Eyes: Negative.    Respiratory: Negative.    Cardiovascular: Negative.    Gastrointestinal: Positive for nausea.   Endocrine: Negative.    Genitourinary: Negative.    Musculoskeletal: Positive for back pain.   Allergic/Immunologic: Negative.    Neurological: Positive for headaches.   Hematological: Negative.    Psychiatric/Behavioral: Negative.      2 systems OR Unable to obtain a complete ROS due to level of consciousness.  Objective:     Vitals:  Temp: 98.4 °F (36.9 °C)  Pulse: (!) 52  Rhythm: sinus bradycardia  BP: (!) 140/83  MAP (mmHg): 104  Resp: (!) 25  SpO2: 97 %  O2 Device (Oxygen Therapy): room air    Temp  Min: 98.4 °F (36.9 °C)  Max: 98.8 °F (37.1 °C)  Pulse  Min: 39  Max: 65  BP  Min: 132/75  Max: 180/95  MAP (mmHg)  Min: 98  Max: 132  Resp  Min: 14  Max: 26  SpO2  Min: 95 %  Max: 100 %    07/20 0701 - 07/21 0700  In: 400 [P.O.:400]  Out: 1400 [Urine:1400]   Unmeasured Output  Urine Occurrence: 1  Stool Occurrence: 2  Emesis Occurrence: 1  Pad Count: 4       Physical Exam  Constitutional: No apparent distress.   Eyes: Conjunctiva clear, anicteric. Lids no lesions.  Head/Ears/Nose/Mouth/Throat/Neck: Moist mucous membranes. External ears, nose atraumatic.   Cardiovascular: IrRegular rhythm.   Respiratory: Comfortable respirations. Clear to auscultation.  Gastrointestinal: No hernia. Soft, nondistended, nontender. + bowel sounds.  Skin: No rashes.    Neurologic Examination:    -Mental Status: Alert.Follows simple commands intermittently. Word finding difficulties   -Cranial nerves:  Pupils equal, round, and reactive bilateral. Left gaze deviation. Right facial droop  -Motor: RUE 1/5, RLE 4-/5, LUE 5/5, LLE 5/5  -Sensation: Diminished to LT on the right side       Medications:  Continuous Scheduled  hydrALAZINE     atorvastatin 40 mg Daily   folic acid  1 mg Daily   lisinopril 20 mg Daily   senna-docusate 8.6-50 mg 1 tablet BID   thiamine 100 mg Daily   PRN  acetaminophen 650 mg Q6H PRN   atropine 1 mg Q2H PRN   hydrALAZINE 10 mg Q4H PRN   magnesium oxide 800 mg PRN   magnesium oxide 800 mg PRN   ondansetron 8 mg Q8H PRN   potassium chloride 10% 40 mEq PRN   potassium chloride 10% 40 mEq PRN   potassium chloride 10% 40 mEq PRN   potassium, sodium phosphates 2 packet PRN   potassium, sodium phosphates 2 packet PRN   potassium, sodium phosphates 2 packet PRN   sodium chloride 0.9% 10 mL PRN     Today I personally reviewed pertinent medications, lines/drains/airways, imaging, cardiology results, laboratory results, microbiology results, notably:    Diet  Diet Adult Regular (IDDSI Level 7)  Diet NPO  Diet Adult Regular (IDDSI Level 7)  Diet NPO

## 2019-07-22 LAB
ALBUMIN SERPL BCP-MCNC: 2.3 G/DL (ref 3.5–5.2)
ALP SERPL-CCNC: 55 U/L (ref 55–135)
ALT SERPL W/O P-5'-P-CCNC: 39 U/L (ref 10–44)
ANION GAP SERPL CALC-SCNC: 8 MMOL/L (ref 8–16)
AST SERPL-CCNC: 44 U/L (ref 10–40)
BASOPHILS # BLD AUTO: 0.04 K/UL (ref 0–0.2)
BASOPHILS NFR BLD: 0.6 % (ref 0–1.9)
BILIRUB SERPL-MCNC: 0.3 MG/DL (ref 0.1–1)
BUN SERPL-MCNC: 9 MG/DL (ref 6–20)
CALCIUM SERPL-MCNC: 8.1 MG/DL (ref 8.7–10.5)
CHLORIDE SERPL-SCNC: 107 MMOL/L (ref 95–110)
CO2 SERPL-SCNC: 24 MMOL/L (ref 23–29)
CREAT SERPL-MCNC: 0.8 MG/DL (ref 0.5–1.4)
DIFFERENTIAL METHOD: ABNORMAL
EOSINOPHIL # BLD AUTO: 0.4 K/UL (ref 0–0.5)
EOSINOPHIL NFR BLD: 5.4 % (ref 0–8)
ERYTHROCYTE [DISTWIDTH] IN BLOOD BY AUTOMATED COUNT: 13 % (ref 11.5–14.5)
EST. GFR  (AFRICAN AMERICAN): >60 ML/MIN/1.73 M^2
EST. GFR  (NON AFRICAN AMERICAN): >60 ML/MIN/1.73 M^2
GLUCOSE SERPL-MCNC: 104 MG/DL (ref 70–110)
HCT VFR BLD AUTO: 34.9 % (ref 40–54)
HEPARIN INDUCED THROMBOCYTOPENIA: NORMAL
HGB BLD-MCNC: 11.4 G/DL (ref 14–18)
IMM GRANULOCYTES # BLD AUTO: 0.05 K/UL (ref 0–0.04)
IMM GRANULOCYTES NFR BLD AUTO: 0.7 % (ref 0–0.5)
LYMPHOCYTES # BLD AUTO: 0.9 K/UL (ref 1–4.8)
LYMPHOCYTES NFR BLD: 13.6 % (ref 18–48)
MAGNESIUM SERPL-MCNC: 2 MG/DL (ref 1.6–2.6)
MCH RBC QN AUTO: 31.9 PG (ref 27–31)
MCHC RBC AUTO-ENTMCNC: 32.7 G/DL (ref 32–36)
MCV RBC AUTO: 98 FL (ref 82–98)
MONOCYTES # BLD AUTO: 0.7 K/UL (ref 0.3–1)
MONOCYTES NFR BLD: 10.1 % (ref 4–15)
NEUTROPHILS # BLD AUTO: 4.7 K/UL (ref 1.8–7.7)
NEUTROPHILS NFR BLD: 69.6 % (ref 38–73)
NRBC BLD-RTO: 0 /100 WBC
PHOSPHATE SERPL-MCNC: 3.8 MG/DL (ref 2.7–4.5)
PLATELET # BLD AUTO: 104 K/UL (ref 150–350)
PMV BLD AUTO: 11.1 FL (ref 9.2–12.9)
POCT GLUCOSE: 87 MG/DL (ref 70–110)
POCT GLUCOSE: 97 MG/DL (ref 70–110)
POTASSIUM SERPL-SCNC: 3.6 MMOL/L (ref 3.5–5.1)
PROT SERPL-MCNC: 5.3 G/DL (ref 6–8.4)
RBC # BLD AUTO: 3.57 M/UL (ref 4.6–6.2)
SODIUM SERPL-SCNC: 139 MMOL/L (ref 136–145)
WBC # BLD AUTO: 6.7 K/UL (ref 3.9–12.7)

## 2019-07-22 PROCEDURE — 99223 PR INITIAL HOSPITAL CARE,LEVL III: ICD-10-PCS | Mod: ,,, | Performed by: INTERNAL MEDICINE

## 2019-07-22 PROCEDURE — 99233 SBSQ HOSP IP/OBS HIGH 50: CPT | Mod: ,,, | Performed by: PSYCHIATRY & NEUROLOGY

## 2019-07-22 PROCEDURE — 99233 PR SUBSEQUENT HOSPITAL CARE,LEVL III: ICD-10-PCS | Mod: ,,, | Performed by: PSYCHIATRY & NEUROLOGY

## 2019-07-22 PROCEDURE — 80053 COMPREHEN METABOLIC PANEL: CPT

## 2019-07-22 PROCEDURE — 25000003 PHARM REV CODE 250: Performed by: STUDENT IN AN ORGANIZED HEALTH CARE EDUCATION/TRAINING PROGRAM

## 2019-07-22 PROCEDURE — 25000003 PHARM REV CODE 250: Performed by: NURSE PRACTITIONER

## 2019-07-22 PROCEDURE — 99223 1ST HOSP IP/OBS HIGH 75: CPT | Mod: ,,, | Performed by: INTERNAL MEDICINE

## 2019-07-22 PROCEDURE — 84100 ASSAY OF PHOSPHORUS: CPT

## 2019-07-22 PROCEDURE — 85025 COMPLETE CBC W/AUTO DIFF WBC: CPT

## 2019-07-22 PROCEDURE — 94761 N-INVAS EAR/PLS OXIMETRY MLT: CPT

## 2019-07-22 PROCEDURE — 25000003 PHARM REV CODE 250: Performed by: PHYSICIAN ASSISTANT

## 2019-07-22 PROCEDURE — 97535 SELF CARE MNGMENT TRAINING: CPT

## 2019-07-22 PROCEDURE — 92507 TX SP LANG VOICE COMM INDIV: CPT

## 2019-07-22 PROCEDURE — 20000000 HC ICU ROOM

## 2019-07-22 PROCEDURE — 83735 ASSAY OF MAGNESIUM: CPT

## 2019-07-22 RX ADMIN — ATORVASTATIN CALCIUM 40 MG: 20 TABLET, FILM COATED ORAL at 11:07

## 2019-07-22 RX ADMIN — SENNOSIDES,DOCUSATE SODIUM 1 TABLET: 8.6; 5 TABLET, FILM COATED ORAL at 11:07

## 2019-07-22 RX ADMIN — ACETAMINOPHEN 650 MG: 325 TABLET ORAL at 05:07

## 2019-07-22 RX ADMIN — FOLIC ACID 1 MG: 1 TABLET ORAL at 11:07

## 2019-07-22 RX ADMIN — Medication 100 MG: at 11:07

## 2019-07-22 RX ADMIN — POTASSIUM CHLORIDE 40 MEQ: 20 SOLUTION ORAL at 04:07

## 2019-07-22 RX ADMIN — LISINOPRIL 20 MG: 20 TABLET ORAL at 11:07

## 2019-07-22 NOTE — PT/OT/SLP PROGRESS
Speech Language Pathology Treatment    Patient Name:  Justyn Rawls   MRN:  [id number]  Admitting Diagnosis: Stroke due to embolism of left middle cerebral artery    Recommendations:                 General Recommendations:  Speech/language therapy  Diet recommendations:  NPO, Liquid Diet Level: NPO   Aspiration Precautions: NPO this date, re-check tomorrow and Strict aspiration precautions   General Precautions: Standard, aspiration, fall, aphasia, NPO  Communication strategies:  provide increased time to answer and offer binary choice when struggling to find words     Subjective   Pt lethargic with headache     Pain/Comfort:  · Pain Rating 1: (head pain no numerical value )  · Pain Rating Post-Intervention 1: (head pain no numerical value )  · Pain Addressed 2: Cessation of Activity    Objective:     Has the patient been evaluated by SLP for swallowing?   Yes  Keep patient NPO? Yes   Current Respiratory Status: room air      Pt asleep upon arrival and easily roused. Pt lethargic this date 2/2 bad headaches over the weekend reported by family. Pts wife reported pt has not been eating much and coughs on most PO intake. SLP will re-check swallow tomorrow 2/2 pt NPO for TTE. Pt oriented to self but unable to orient to time or place requiring binary choice. Pt completed auto speech tasks with min phonemic cues to initiate. Pt unable to complete confrontation naming tasks independently however correctly named 3/3 items with max phonemic cues and binary choice offering. Pt completed simple one and two step commands with 100% accuracy independently. Pt with continued circumlocutory speech and semantic/phonemic paraphasias however is demonstrating progress in sessions. Session discontinued this date 2/2 pt head pain ad decreased engagement. SLP education included diet recs, npo, and therapeutic POC. Pt and family verbalized understanding and agreement. Given family report of pts difficulty with PO intake, which is a change  in baseline status,  SLP discussed with team to keep pt npo following TTE until pt re-evaluated tomorrow. Meds can be crushed in puree. Speech to continue to follow.     Assessment:     Justyn Rawls is a 49 y.o. male with an SLP diagnosis of Aphasia, Dysphagia, Dysarthria and Apraxia.      Goals:   Multidisciplinary Problems     SLP Goals        Problem: SLP Goal    Goal Priority Disciplines Outcome   SLP Goal     SLP Ongoing (interventions implemented as appropriate)   Description:  Speech Language Pathology Goals  Updated Goals expected to be met by 7/29    1. Pt will participate in ongoing assessment of swallow to determine the least restrictive diet.  2. Pt will participate in ongoing assessment of speech language and cognitive linguistic skills to help rule out deficits and determine therapeutic plan of care   Updated additional goals:   1. Pt will participate in simple visual scanning tasks with 80% accuracy and mod cues.  2. Pt will complete OMEs x10 with SLP and independently   3. Pt will complete orientation tasks with 80% accuracy and mod cues.  4. Pt will complete simple naming tasks with 50% accuracy and mod cues.   5. Pt will follow simple two step commands with 50% accuracy and mod cues.  6. Pt will complete automatic speech tasks with 80% accuracy and mod cues.  7. Pt will answer Y/N questions with 60% accuracy and mod cues.   8. Pt will participate in ongoing assessment of reading and writing skills.                         Plan:     · Patient to be seen:  4 x/week   · Plan of Care expires:     · Plan of Care reviewed with:  patient, family   · SLP Follow-Up:  Yes       Discharge recommendations:  rehabilitation facility   Barriers to Discharge:  Level of Skilled Assistance Needed      Time Tracking:     SLP Treatment Date:   07/22/19  Speech Start Time:  0920  Speech Stop Time:  0932     Speech Total Time (min):  12 min    Billable Minutes: Speech Therapy Individual 12    Opal Thompson  [facility]  07/22/2019

## 2019-07-22 NOTE — PLAN OF CARE
Problem: Adult Inpatient Plan of Care  Goal: Plan of Care Review  Outcome: Ongoing (interventions implemented as appropriate)  POC reviewed with pt and family at 1400. . Questions and concerns addressed with family. ABDIAS is scheduled for tomorrow. NPO post MN instructed. No acute events today. Pt progressing toward goals. Will continue to monitor. See flowsheets for full assessment and VS info.

## 2019-07-22 NOTE — PROGRESS NOTES
[facility]  Cardiology  Progress Note    Patient Name: Justyn Rawls  MRN: [id number]  Admission Date: 7/14/2019  Hospital Length of Stay: 8 days  Code Status: Full Code   Attending Physician: Ernesto Duran MD   Primary Care Physician: No primary care provider on file.  Expected Discharge Date:   Principal Problem:Stroke due to embolism of left middle cerebral artery    Subjective:     Hospital Course:   Pt admitted for L MCA stroke. Plts dropped <100 on 7/19 so ABDIAS was delayed. Plts now 104 and pt is stable.          Review of Systems   Reason unable to perform ROS: Pt unable to answer questions because of expressive aphasia. Pt did deny any chest pain, abdominal pain or difficulty breathing.      Objective:     Vital Signs (Most Recent):  Temp: 98.8 °F (37.1 °C) (07/22/19 0705)  Pulse: (!) 56 (07/22/19 0805)  Resp: 16 (07/22/19 0805)  BP: (!) 152/87 (07/22/19 0805)  SpO2: 97 % (07/22/19 0805) Vital Signs (24h Range):  Temp:  [98.1 °F (36.7 °C)-98.8 °F (37.1 °C)] 98.8 °F (37.1 °C)  Pulse:  [49-81] 56  Resp:  [15-36] 16  SpO2:  [94 %-100 %] 97 %  BP: (123-175)/(56-98) 152/87     Weight: 89.4 kg (197 lb)  Body mass index is 23.98 kg/m².     SpO2: 97 %  O2 Device (Oxygen Therapy): room air      Intake/Output Summary (Last 24 hours) at 7/22/2019 0913  Last data filed at 7/22/2019 0805  Gross per 24 hour   Intake 90 ml   Output 1050 ml   Net -960 ml       Lines/Drains/Airways     Peripheral Intravenous Line                 Peripheral IV - Single Lumen 07/17/19 1406 20 G Left Antecubital 4 days         Peripheral IV - Single Lumen 07/17/19 1833 Anterior;Right Foot 4 days                Physical Exam   Constitutional: He appears well-developed and well-nourished.   HENT:   Head: Normocephalic and atraumatic.   Right sided facial drooping   Eyes:   EOM abnormal- unable to look in different directions without turning head   Neck: Normal range of motion. Neck supple. No JVD present.   Cardiovascular:  Normal rate and normal heart sounds. Exam reveals no gallop and no friction rub.   No murmur heard.  No carotid bruits   Pulmonary/Chest: Effort normal and breath sounds normal. No respiratory distress. He exhibits no tenderness.   Decreased breath sounds in LL b/l   Abdominal: Soft. Bowel sounds are normal. He exhibits no distension. There is tenderness.   Diffuse abdominal tenderness to palpation   Musculoskeletal: He exhibits edema. He exhibits no tenderness or deformity.   2+ nonpitting edema in R hand and R ankle   Lymphadenopathy:     He has no cervical adenopathy.   Neurological: A cranial nerve deficit is present. Coordination abnormal.   Pt has no sensation or movement in RUE or RLE.  Can move LUE but has poor motor control   Can move LLE but cannot follow commands like wiggling toes  Pt has expressive aphasia   Skin: Skin is warm and dry. No rash noted. No erythema.           Assessment and Plan:       * Stroke due to embolism of left middle cerebral artery  Pt is 50yo M w/ PMH afib s/p watchman in 2017 who presented w/ stroke d/t L MCA embolism s/p tPA.     Plan:  - Carotid U/S to evaluate for possible source of emboli showed no stenosis or occlusion  - ABDIAS with bubble study scheduled for 7/23 to assess watchman placement, evaluate for thrombus and evaluate for patent ASD  - make pt NPO at midnight for ABDIAS tomorrow  - Continue holding anticoagulation  - Suggest antiplatelet therapy per guidelines (ASA monotherapy since pt had major stroke)- consider holding this until Plts are increased          VTE Risk Mitigation (From admission, onward)        Ordered     IP VTE LOW RISK PATIENT  Once      07/14/19 0227     Place sequential compression device  Until discontinued      07/14/19 0227          Gris Sandoval MD  Cardiology  [facility]

## 2019-07-22 NOTE — PT/OT/SLP PROGRESS
"Occupational Therapy   Treatment    Name: Justyn Rawls  MRN: [id number]  Admitting Diagnosis:  Stroke due to embolism of left middle cerebral artery       Recommendations:     Discharge Recommendations: rehabilitation facility  Discharge Equipment Recommendations:  (TBD)  Barriers to discharge:  Inaccessible home environment, Decreased caregiver support    Assessment:     Justyn Rawls is a 49 y.o. male with a medical diagnosis of Stroke due to embolism of left middle cerebral artery.  He presents with performance deficits affecting function are weakness, impaired self care skills, impaired balance, decreased coordination, decreased safety awareness, decreased ROM, impaired endurance, impaired functional mobilty, visual deficits, impaired sensation, gait instability, impaired cognition, decreased lower extremity function, decreased upper extremity function, impaired fine motor, impaired coordination, impaired cardiopulmonary response to activity, abnormal tone.     Rehab Prognosis:  Good; patient would benefit from acute skilled OT services to address these deficits and reach maximum level of function.       Plan:     Patient to be seen 4 x/week to address the above listed problems via self-care/home management, neuromuscular re-education, cognitive retraining, therapeutic activities, therapeutic exercises, sensory integration  · Plan of Care Expires: 08/13/19  · Plan of Care Reviewed with: patient, spouse    Subjective   Patient:  Answering "yes" to most questions.  Pain/Comfort:  · Pain Rating 1: 0/10  · Pain Rating Post-Intervention 1: 0/10    Objective:     Communicated with: Nurse prior to session.  Patient found supine with bed alarm, blood pressure cuff, peripheral IV, pulse ox (continuous), SCD, telemetry upon OT entry to room. Family not present.    General Precautions: Standard, aspiration, fall, aphasia   Orthopedic Precautions:N/A   Braces: N/A     Occupational Performance:     Bed Mobility:  "   · Patient completed Rolling/Turning to Left with  moderate assistance  · Patient completed Scooting/Bridging with minimum assistance  · Patient completed Supine to Sit with minimum assistance  · Patient completed Sit to Supine with minimum assistance     Functional Mobility/Transfers:  · Min assist with scooting along the EOB    Activities of Daily Living:  · Grooming: moderate assistance while seated EOB with right UE in weight bearing  · Upper Body Dressing: moderate assistance while seated EOB  · Lower Body Dressing: maximal assistance while seated EOB    AMPAC 6 Click ADL: 12    Treatment & Education:  Patient/ Family education provided for stroke warning signs, prevention guidelines and personal risk factors.  Patient/ Family verbalizing understanding via teach back method.   Patient education provided on role of OT and need for rehab upon discharge.  Patient education provided on hemiplegic dressing technique, right UE weight bearing / positioning, postural control, and transfers.  Continued education, patient/ family training recommended. Patient alert; able to follow 2/4 one step commands.  Patient able to sequence 6/7 days of the week and 10/12 months of the year.  Patient's functional status and disposition recommendation discussed with stroke team in daily rounds.  White board updated in patient's room.  OT asked if there were any other questions; patient/ family had no further questions.     Patient left supine with all lines intact, call button in reach and bed alarm onEducation:      GOALS:   Multidisciplinary Problems     Occupational Therapy Goals        Problem: Occupational Therapy Goal    Goal Priority Disciplines Outcome Interventions   Occupational Therapy Goal     OT, PT/OT     Description:  Goals set 7/15 to be met by 7/29   Patient will increase functional independence with ADLs by performing:    UE Dressing with Minimal Assistance with hemiplegic technique.  LE Dressing with Minimal  Assistance.  Grooming while standing with Contact Guard Assistance.   Toileting from bedside commode with Minimal Assistance for hygiene and clothing management.   Rolling to Left with Contact Guard Assistance.   Rolling to Right with Supervision.   Stand pivot transfers with Contact Guard Assistance.  Patient/Caregivers will be independent in assisting in bed mobility/positioning  Patient/Caregivers will be independent in HEP for weightbearing with RUE, PROM of RUE.                        Time Tracking:     OT Date of Treatment: 07/22/19  OT Start Time: 0850  OT Stop Time: 0917  OT Total Time (min): 27 min    Billable Minutes:Self Care/Home Management 27    JONNY Rosario  7/22/2019

## 2019-07-22 NOTE — ANESTHESIA PREPROCEDURE EVALUATION
[facility]  Anesthesia Pre-Operative Evaluation         Patient Name: Justyn Rawls  YOB: 1970  MRN: [id number]    SUBJECTIVE:     Pre-operative evaluation for Procedure(s) (LRB):  ECHOCARDIOGRAM, TRANSESOPHAGEAL (N/A)     07/22/2019    Justyn Rawls is a 49 y.o. male w/ a significant PMHx of L occipital and temporal CVA, AFib s/p Watchman device in 2017, ETOH abuse and current admission for L MCA embolic stroke s/p tPA. TTE on 7/14 with EF 60% and diastolic dysfunction. He has residual RUE deficit with expressive aphasia and R sided facial droop.    Patient now presents for the above procedure(s).      LDA:        Peripheral IV - Single Lumen 07/17/19 1406 20 G Left Antecubital (Active)   Site Assessment Clean;Dry;Intact;No redness;No swelling 7/22/2019 11:05 AM   Line Status Saline locked 7/22/2019 11:05 AM   Dressing Status Clean;Dry;Intact 7/22/2019 11:05 AM   Dressing Intervention Dressing reinforced 7/22/2019 11:05 AM   Dressing Change Due 07/21/19 7/22/2019 11:05 AM   Site Change Due 07/21/19 7/22/2019 11:05 AM   Reason Not Rotated Poor venous access 7/22/2019 11:05 AM   Number of days: 5            Peripheral IV - Single Lumen 07/22/19 1249 22 G Right Hand (Active)   Site Assessment Clean;Dry;Intact;No redness;No swelling 7/22/2019 12:49 PM   Line Status Blood return noted;Flushed;Saline locked 7/22/2019 12:49 PM   Dressing Status Clean;Dry;Intact 7/22/2019 12:49 PM   Dressing Intervention New dressing 7/22/2019 12:49 PM   Dressing Change Due 07/26/19 7/22/2019 12:49 PM   Site Change Due 07/26/19 7/22/2019 12:49 PM   Reason Not Rotated Not due 7/22/2019 12:49 PM   Number of days: 0       Prev airway: None documented.    Drips: None documented.      Patient Active Problem List   Diagnosis    Paroxysmal atrial fibrillation    PAF (paroxysmal atrial fibrillation)    Stroke due to embolism of left middle cerebral artery    S/P admn tPA in diff fac w/n last 24 hr bef adm  to crnt fac    Hemiparesis, right    Aphasia    ETOH abuse    Cytotoxic brain edema    Brain compression    Acute metabolic encephalopathy    Limitation of activities due to disability    Thrombocytopenia    Hypertension       Review of patient's allergies indicates:  No Known Allergies    Current Inpatient Medications:   atorvastatin  40 mg Oral Daily    folic acid  1 mg Oral Daily    lisinopril  20 mg Oral Daily    senna-docusate 8.6-50 mg  1 tablet Oral BID    thiamine  100 mg Oral Daily       No current facility-administered medications on file prior to encounter.      No current outpatient medications on file prior to encounter.       Past Surgical History:   Procedure Laterality Date    watchman device         Social History     Socioeconomic History    Marital status:      Spouse name: Not on file    Number of children: Not on file    Years of education: Not on file    Highest education level: Not on file   Occupational History    Not on file   Social Needs    Financial resource strain: Not on file    Food insecurity:     Worry: Not on file     Inability: Not on file    Transportation needs:     Medical: Not on file     Non-medical: Not on file   Tobacco Use    Smoking status: Never Smoker    Smokeless tobacco: Never Used   Substance and Sexual Activity    Alcohol use: Yes     Comment: 2 times per month    Drug use: No    Sexual activity: Yes   Lifestyle    Physical activity:     Days per week: Not on file     Minutes per session: Not on file    Stress: Not on file   Relationships    Social connections:     Talks on phone: Not on file     Gets together: Not on file     Attends Cheondoism service: Not on file     Active member of club or organization: Not on file     Attends meetings of clubs or organizations: Not on file     Relationship status: Not on file   Other Topics Concern    Not on file   Social History Narrative    Not on file       OBJECTIVE:     Vital Signs  Range (Last 24H):  Temp:  [36.7 °C (98 °F)-37.1 °C (98.8 °F)]   Pulse:  [46-82]   Resp:  [13-36]   BP: (123-175)/(56-98)   SpO2:  [94 %-98 %]       Significant Labs:  Lab Results   Component Value Date    WBC 6.70 2019    HGB 11.4 (L) 2019    HCT 34.9 (L) 2019     (L) 2019    CHOL 159 2019    TRIG 47 2019    HDL 58 2019    ALT 39 2019    AST 44 (H) 2019     2019    K 3.6 2019     2019    CREATININE 0.8 2019    BUN 9 2019    CO2 24 2019    TSH 0.527 2019    INR 1.1 2019    HGBA1C 5.1 2019       Diagnostic Studies: No relevant studies.    EK2019  Vent. Rate : 049 BPM     Atrial Rate : 049 BPM     P-R Int : 118 ms          QRS Dur : 092 ms      QT Int : 426 ms       P-R-T Axes : 058 059 046 degrees     QTc Int : 384 ms    Sinus bradycardia  Otherwise normal ECG  No previous ECGs available  Confirmed by Tae Liu MD ([id number]) on 2019 12:46:54 AM    2D ECHO:  2019  · Normal left ventricular systolic function. The estimated ejection fraction is 60%  · Normal LV diastolic function.  · Normal right ventricular systolic function.  · Mild tricuspid regurgitation.  · Intermediate central venous pressure (8 mm Hg).  · The estimated PA systolic pressure is 33 mm Hg    ASSESSMENT/PLAN:       Anesthesia Evaluation    I have reviewed the Patient Summary Reports.    I have reviewed the Nursing Notes.   I have reviewed the Medications.     Review of Systems  Anesthesia Hx:  No problems with previous Anesthesia  Denies Family Hx of Anesthesia complications.   Denies Personal Hx of Anesthesia complications.   Hematology/Oncology:     Oncology Normal     EENT/Dental:EENT/Dental Normal   Cardiovascular:   Exercise tolerance: good Hypertension    Pulmonary:   Denies Pneumonia Denies COPD.  Denies Asthma.    Renal/:  Renal/ Normal     Hepatic/GI:  Hepatic/GI Normal     Musculoskeletal:  Musculoskeletal Normal    Neurological:   CVA    Endocrine:  Endocrine Normal    Dermatological:  Skin Normal    Psych:  Psychiatric Normal           Physical Exam  General:  Well nourished    Airway/Jaw/Neck:  Airway Findings: Mouth Opening: Normal Improves to II with phonation.  TM Distance: Normal, at least 6 cm      Dental:  Dental Findings: In tact   Chest/Lungs:  Chest/Lungs Findings: Clear to auscultation, Normal Respiratory Rate     Heart/Vascular:  Heart Findings: Rate: Bradycardia  Rhythm: Regular Rhythm  Sounds: Normal        Mental Status:  Mental Status Findings:  Cooperative         Anesthesia Plan  Type of Anesthesia, risks & benefits discussed:  Anesthesia Type:  general  Patient's Preference:   Intra-op Monitoring Plan: standard ASA monitors  Intra-op Monitoring Plan Comments:   Post Op Pain Control Plan: per primary service following discharge from PACU  Post Op Pain Control Plan Comments:   Induction:   IV  Beta Blocker:  Patient is not currently on a Beta-Blocker (No further documentation required).       Informed Consent: Patient representative understands risks and agrees with Anesthesia plan.  Questions answered. Anesthesia consent signed with patient representative.  ASA Score: 3     Day of Surgery Review of History & Physical:    H&P update referred to the provider.         Ready For Surgery From Anesthesia Perspective.

## 2019-07-22 NOTE — SUBJECTIVE & OBJECTIVE
Interval History: ABDIAS procedure tomorrow per Cardiology, NPO after midnight, SLP recommended further evaluation after procedure for swallow.        Review of Systems   Constitutional: Negative for fever.   HENT: Positive for trouble swallowing.    Eyes: Negative for visual disturbance.   Respiratory: Negative for chest tightness and shortness of breath.    Cardiovascular: Negative for chest pain.   Gastrointestinal: Negative for diarrhea, nausea and vomiting.   Genitourinary: Negative for difficulty urinating.   Musculoskeletal: Negative for neck pain and neck stiffness.   Skin: Negative for color change.   Neurological: Positive for facial asymmetry, speech difficulty, weakness and headaches. Negative for dizziness, tremors, syncope, light-headedness and numbness.   Psychiatric/Behavioral: Negative for agitation and behavioral problems.       Objective:     Vitals:  Temp: 99.3 °F (37.4 °C)  Pulse: 79  Rhythm: normal sinus rhythm  BP: (!) 143/73  MAP (mmHg): 100  Resp: (!) 28  SpO2: (!) 92 %  O2 Device (Oxygen Therapy): room air    Temp  Min: 98 °F (36.7 °C)  Max: 99.3 °F (37.4 °C)  Pulse  Min: 46  Max: 82  BP  Min: 123/69  Max: 175/85  MAP (mmHg)  Min: 80  Max: 128  Resp  Min: 13  Max: 36  SpO2  Min: 92 %  Max: 98 %    07/21 0701 - 07/22 0700  In: 210 [P.O.:210]  Out: 1050 [Urine:1050]   Unmeasured Output  Urine Occurrence: 0  Stool Occurrence: 1  Emesis Occurrence: 1  Pad Count: 4       Physical Exam   Constitutional: He appears well-developed and well-nourished.   HENT:   Head: Normocephalic and atraumatic.   Neck: Normal range of motion.   Cardiovascular: Regular rhythm. Bradycardia present.   Pulmonary/Chest: Effort normal and breath sounds normal.   Abdominal: Soft. Normal appearance.   Neurological:   AAOx3, follow commands   [id number] GCS (15)   PERRLA   EOMi   Word finding difficulty   R facial droop   RSW   RUE-1/5  RLE-4/5   LUE-5/5  LLE-5/5   Decreased sensation on R-side          Medications:  Continuous  Scheduled  atorvastatin 40 mg Daily   lisinopril 20 mg Daily   senna-docusate 8.6-50 mg 1 tablet BID   PRN  acetaminophen 650 mg Q6H PRN   atropine 1 mg Q2H PRN   hydrALAZINE 10 mg Q4H PRN   magnesium oxide 800 mg PRN   magnesium oxide 800 mg PRN   ondansetron 8 mg Q8H PRN   potassium chloride 10% 40 mEq PRN   potassium chloride 10% 40 mEq PRN   potassium chloride 10% 40 mEq PRN   potassium, sodium phosphates 2 packet PRN   potassium, sodium phosphates 2 packet PRN   potassium, sodium phosphates 2 packet PRN   sodium chloride 0.9% 10 mL PRN     Today I personally reviewed pertinent medications, lines/drains/airways, imaging, cardiology results, laboratory results, microbiology results, notably:    Diet  Diet Dysphagia Soft (IDDSI Level 6)  Diet NPO  Diet Dysphagia Soft (IDDSI Level 6)  Diet NPO

## 2019-07-22 NOTE — ASSESSMENT & PLAN NOTE
Patient has watch vargas device when he was in . His wife states that he received anticoagulation before the device placement with no reported side effects    Afib is a Stroke risk factor  · Rate control  CHADVASC score: 2  HAS BLED score: 2    TTE (07/14/2019):   Normal left ventricular systolic function. The estimated ejection fraction is 60%  · Normal LV diastolic function.  · Normal right ventricular systolic function.  · Mild tricuspid regurgitation.  · Intermediate central venous pressure (8 mm Hg).  · The estimated PA systolic pressure is 33 mm Hg    ABDIAS today     Needs anticoagulation - Hold anticoagulation in the setting of thrombocytopenia

## 2019-07-22 NOTE — SUBJECTIVE & OBJECTIVE
Review of Systems   Reason unable to perform ROS: Pt unable to answer questions because of expressive aphasia. Pt did deny any chest pain, abdominal pain or difficulty breathing.      Objective:     Vital Signs (Most Recent):  Temp: 98.8 °F (37.1 °C) (07/22/19 0705)  Pulse: (!) 56 (07/22/19 0805)  Resp: 16 (07/22/19 0805)  BP: (!) 152/87 (07/22/19 0805)  SpO2: 97 % (07/22/19 0805) Vital Signs (24h Range):  Temp:  [98.1 °F (36.7 °C)-98.8 °F (37.1 °C)] 98.8 °F (37.1 °C)  Pulse:  [49-81] 56  Resp:  [15-36] 16  SpO2:  [94 %-100 %] 97 %  BP: (123-175)/(56-98) 152/87     Weight: 89.4 kg (197 lb)  Body mass index is 23.98 kg/m².     SpO2: 97 %  O2 Device (Oxygen Therapy): room air      Intake/Output Summary (Last 24 hours) at 7/22/2019 0913  Last data filed at 7/22/2019 0805  Gross per 24 hour   Intake 90 ml   Output 1050 ml   Net -960 ml       Lines/Drains/Airways     Peripheral Intravenous Line                 Peripheral IV - Single Lumen 07/17/19 1406 20 G Left Antecubital 4 days         Peripheral IV - Single Lumen 07/17/19 1833 Anterior;Right Foot 4 days                Physical Exam   Constitutional: He appears well-developed and well-nourished.   HENT:   Head: Normocephalic and atraumatic.   Right sided facial drooping   Eyes:   EOM abnormal- unable to look in different directions without turning head   Neck: Normal range of motion. Neck supple. No JVD present.   Cardiovascular: Normal rate and normal heart sounds. Exam reveals no gallop and no friction rub.   No murmur heard.  No carotid bruits   Pulmonary/Chest: Effort normal and breath sounds normal. No respiratory distress. He exhibits no tenderness.   Decreased breath sounds in LL b/l   Abdominal: Soft. Bowel sounds are normal. He exhibits no distension. There is tenderness.   Diffuse abdominal tenderness to palpation   Musculoskeletal: He exhibits edema. He exhibits no tenderness or deformity.   2+ nonpitting edema in R hand and R ankle   Lymphadenopathy:      He has no cervical adenopathy.   Neurological: A cranial nerve deficit is present. Coordination abnormal.   Pt has no sensation or movement in RUE or RLE.  Can move LUE but has poor motor control   Can move LLE but cannot follow commands like wiggling toes  Pt has expressive aphasia   Skin: Skin is warm and dry. No rash noted. No erythema.

## 2019-07-22 NOTE — PROGRESS NOTES
[facility]  Vascular Neurology  Comprehensive Stroke Center  Progress Note    Assessment/Plan:     * Stroke due to embolism of left middle cerebral artery  50 y/o male with L MCA stroke due to M1 occlusion received IV TPA and thrombectomy to TICI 2C, probable due to afib    Antithrombotics: Need anticoagulation after TPA window    Statins: Lipitor 40 mg daily    Aggressive risk factor modification: A-Fib, alcohol     Rehab efforts: The patient has been evaluated by a stroke team provider and the therapy needs have been fully considered based off the presenting complaints and exam findings. The following therapy evaluations are needed: PT evaluate and treat, OT evaluate and treat, SLP evaluate and treat, PM&R evaluate for appropriate placement    Diagnostics ordered/pending: HgbA1C to assess blood glucose levels, Lipid Profile to assess cholesterol levels, MRI head without contrast to assess brain parenchyma, TTE to assess cardiac function/status     VTE prophylaxis: SCD's raquel began Heparin 5000 units sc Q8H on 7-15-10 at 0600 but held (07/19/2019) due to thrombocytopenia     BP parameters: Infarct: Post sucessful thrombectomy, SBP <140        Thrombocytopenia  Patient presents with normal PLTs count and dropped while hospitalization could be heparin related  Workup thrombocytopenia as per primary team     Aphasia  Due to stroke  Aggressive therapy    Hemiparesis, right  Due to stroke  Aggressive therapy    S/P admn tPA in diff fac w/n last 24 hr bef adm to crnt fac  Close monitoring in NCC 24 hours post administration         PAF (paroxysmal atrial fibrillation)  Patient has watch vargas device when he was in . His wife states that he received anticoagulation before the device placement with no reported side effects    Afib is a Stroke risk factor  · Rate control  CHADVASC score: 2  HAS BLED score: 2    TTE (07/14/2019):   Normal left ventricular systolic function. The estimated ejection  fraction is 60%  · Normal LV diastolic function.  · Normal right ventricular systolic function.  · Mild tricuspid regurgitation.  · Intermediate central venous pressure (8 mm Hg).  · The estimated PA systolic pressure is 33 mm Hg    ABDIAS today     Needs anticoagulation - Hold anticoagulation in the setting of thrombocytopenia         7/14 admit to [facility] L MCA embolic stroke, s/p tpa, s/p thrombectomy L M1 multiple clots removed. ETOH use, monitor for withdrawals.  7/15 scheduled MRI wo contrast for tomorrow morning with planning for chemical DVT ppx. Continued 2% hypertonic saline and mannitol.   7/16 MRI wo contrast currently postponed, awaiting clarification that atrial device is safe to scan.  No acute events today. Continued 2% saline and mannitol.   7/17 MRI wo contrast preformed today, awaiting results. Plans to start anticoagulation following MRI per stroke team.   7/18 Cardiology consulted today for recs on whether to start AC or preform a ABDIAS to check potential thrombi on watchman.   7/19 Platelet count decreased below 100 while Heparin. D/c heparin and hold on any AC.  Gave lasix IV 20 to help with fluid overload. D/c 2%HTS  7/20 Platelet count still below 100.  HIT antibody panel pending.  Hold on ABDIAS until thrombocytopenia resolved.   7/21: Developing HA. Repeated CTH with no significant interval changes      STROKE DOCUMENTATION   Acute Stroke Times   Last Known Normal Date: 07/13/19  Last Known Normal Time: 2130  Symptom Onset Date: 07/13/19  Symptom Onset Time: 2130  Stroke Team Called Date: 07/13/19  Stroke Team Called Time: 2245  Stroke Team Arrival Date: 07/14/19  Stroke Team Arrival Time: 0107  CT Interpretation Time: 2251  Decision to Treat Time for Alteplase: 2307(bolus given)  Decision to Treat Time for IR: 0107    NIH Scale:  1a. Level of Consciousness: 0-->Alert, keenly responsive  1b. LOC Questions: 0-->Answers both questions correctly  1c. LOC Commands: 0-->Performs both tasks correctly  2.  Best Gaze: 0-->Normal  3. Visual: 1-->Partial hemianopia  4. Facial Palsy: 2-->Partial paralysis (total or near-total paralysis of lower face)  5a. Motor Arm, Left: 0-->No drift, limb holds 90 (or 45) degrees for full 10 secs  5b. Motor Arm, Right: 3-->No effort against gravity, limb falls  6a. Motor Leg, Left: 0-->No drift, leg holds 30 degree position for full 5 secs  6b. Motor Leg, Right: 2-->Some effort against gravity, leg falls to bed by 5 secs, but has some effort against gravity  7. Limb Ataxia: 0-->Absent  8. Sensory: 0-->Normal, no sensory loss  9. Best Language: 1-->Mild-to-moderate aphasia, some obvious loss of fluency or facility of comprehension, without significant limitation on ideas expressed or form of expression. Reduction of speech and/or comprehension, however, makes conversation. . . (see row details)  10. Dysarthria: 1-->Mild-to-moderate dysarthria, patient slurs at least some words and, at worst, can be understood with some difficulty  11. Extinction and Inattention (formerly Neglect): 0-->No abnormality  Total (NIH Stroke Scale): 10       Modified Oakwood Score: 0  Brandin Coma Scale:15   ABCD2 Score:    [id number] Score:2  HAS -BLED Score:2  ICH Score:   Hunt & Ta Classification:              Review of Systems   Reason unable to perform ROS: Pt unable to answer questions because of expressive aphasia. Pt did deny any chest pain, abdominal pain or difficulty breathing.      Objective:     Vital Signs (Most Recent):  Temp: 98 °F (36.7 °C) (07/22/19 1105)  Pulse: (!) 46 (07/22/19 1305)  Resp: (!) 25 (07/22/19 1305)  BP: (!) 147/88 (07/22/19 1305)  SpO2: 96 % (07/22/19 1305) Vital Signs (24h Range):  Temp:  [98 °F (36.7 °C)-98.8 °F (37.1 °C)] 98 °F (36.7 °C)  Pulse:  [46-82] 46  Resp:  [13-36] 25  SpO2:  [94 %-98 %] 96 %  BP: (123-175)/(56-98) 147/88     Weight: 89.4 kg (197 lb)  Body mass index is 23.98 kg/m².     SpO2: 96 %  O2 Device (Oxygen Therapy): room air      Intake/Output Summary  (Last 24 hours) at 7/22/2019 1514  Last data filed at 7/22/2019 1305  Gross per 24 hour   Intake 30 ml   Output 1250 ml   Net -1220 ml       Lines/Drains/Airways     Peripheral Intravenous Line                 Peripheral IV - Single Lumen 07/17/19 1406 20 G Left Antecubital 5 days         Peripheral IV - Single Lumen 07/22/19 1249 22 G Right Hand less than 1 day                Physical Exam   Constitutional: He appears well-developed and well-nourished.   HENT:   Head: Normocephalic and atraumatic.   Right sided facial drooping   Eyes:   EOM abnormal- unable to look in different directions without turning head   Neck: Normal range of motion. Neck supple. No JVD present.   Cardiovascular: Normal rate and normal heart sounds. Exam reveals no gallop and no friction rub.   No murmur heard.  No carotid bruits   Pulmonary/Chest: Effort normal and breath sounds normal. No respiratory distress. He exhibits no tenderness.   Decreased breath sounds in LL b/l   Abdominal: Soft. Bowel sounds are normal. He exhibits no distension. There is tenderness.   Diffuse abdominal tenderness to palpation   Musculoskeletal: He exhibits edema. He exhibits no tenderness or deformity.   2+ nonpitting edema in R hand and R ankle   Lymphadenopathy:     He has no cervical adenopathy.   Neurological: A cranial nerve deficit is present. Coordination abnormal.   Pt has no sensation or movement in RUE or RLE.  Can move LUE but has poor motor control   Can move LLE but cannot follow commands like wiggling toes  Pt has expressive aphasia   Skin: Skin is warm and dry. No rash noted. No erythema.         Review of Systems   Reason unable to perform ROS: Pt unable to answer questions because of expressive aphasia. Pt did deny any chest pain, abdominal pain or difficulty breathing.        Physical Exam   Constitutional: He appears well-developed and well-nourished.   HENT:   Head: Normocephalic and atraumatic.   Right sided facial drooping   Eyes:   EOM  abnormal- unable to look in different directions without turning head   Neck: Normal range of motion. Neck supple. No JVD present.   Cardiovascular: Normal rate and normal heart sounds. Exam reveals no gallop and no friction rub.   No murmur heard.  No carotid bruits   Pulmonary/Chest: Effort normal and breath sounds normal. No respiratory distress. He exhibits no tenderness.   Decreased breath sounds in LL b/l   Abdominal: Soft. Bowel sounds are normal. He exhibits no distension. There is tenderness.   Diffuse abdominal tenderness to palpation   Musculoskeletal: He exhibits edema. He exhibits no tenderness or deformity.   2+ nonpitting edema in R hand and R ankle   Lymphadenopathy:     He has no cervical adenopathy.   Neurological: A cranial nerve deficit is present. Coordination abnormal.   Pt has no sensation or movement in RUE or RLE.  Can move LUE but has poor motor control   Can move LLE but cannot follow commands like wiggling toes  Pt has expressive aphasia   Skin: Skin is warm and dry. No rash noted. No erythema.           Zainab Caceres MD  [facility]  Department of Vascular Neurology   [facility]

## 2019-07-22 NOTE — NURSING
ABDIAS cancelled for today and scheduled for tomorrow. Patient is awake and alert. YULIA performed with a total score of 20. Wife at bedside during YULIA. Diet ordered.

## 2019-07-22 NOTE — ASSESSMENT & PLAN NOTE
- rate control  - Off AC due to thrombocytopenia and ruling out HIT  - Plans for ABDIAS per card on 7/22  - NPO after midnight for ABDIAS on 7/23

## 2019-07-22 NOTE — ASSESSMENT & PLAN NOTE
- Neurological exam is unchanged  - CTH this am with no significant interval changes  -neuro checks q1hr   -vital checks q 1hr   - Hold AC until results Rabia and JERI panel   - PLTs 104 7/22  -ABDIAS pending for 7/23

## 2019-07-22 NOTE — SUBJECTIVE & OBJECTIVE
Review of Systems   Reason unable to perform ROS: Pt unable to answer questions because of expressive aphasia. Pt did deny any chest pain, abdominal pain or difficulty breathing.      Objective:     Vital Signs (Most Recent):  Temp: 98 °F (36.7 °C) (07/22/19 1105)  Pulse: (!) 46 (07/22/19 1305)  Resp: (!) 25 (07/22/19 1305)  BP: (!) 147/88 (07/22/19 1305)  SpO2: 96 % (07/22/19 1305) Vital Signs (24h Range):  Temp:  [98 °F (36.7 °C)-98.8 °F (37.1 °C)] 98 °F (36.7 °C)  Pulse:  [46-82] 46  Resp:  [13-36] 25  SpO2:  [94 %-98 %] 96 %  BP: (123-175)/(56-98) 147/88     Weight: 89.4 kg (197 lb)  Body mass index is 23.98 kg/m².     SpO2: 96 %  O2 Device (Oxygen Therapy): room air      Intake/Output Summary (Last 24 hours) at 7/22/2019 1514  Last data filed at 7/22/2019 1305  Gross per 24 hour   Intake 30 ml   Output 1250 ml   Net -1220 ml       Lines/Drains/Airways     Peripheral Intravenous Line                 Peripheral IV - Single Lumen 07/17/19 1406 20 G Left Antecubital 5 days         Peripheral IV - Single Lumen 07/22/19 1249 22 G Right Hand less than 1 day                Physical Exam   Constitutional: He appears well-developed and well-nourished.   HENT:   Head: Normocephalic and atraumatic.   Right sided facial drooping   Eyes:   EOM abnormal- unable to look in different directions without turning head   Neck: Normal range of motion. Neck supple. No JVD present.   Cardiovascular: Normal rate and normal heart sounds. Exam reveals no gallop and no friction rub.   No murmur heard.  No carotid bruits   Pulmonary/Chest: Effort normal and breath sounds normal. No respiratory distress. He exhibits no tenderness.   Decreased breath sounds in LL b/l   Abdominal: Soft. Bowel sounds are normal. He exhibits no distension. There is tenderness.   Diffuse abdominal tenderness to palpation   Musculoskeletal: He exhibits edema. He exhibits no tenderness or deformity.   2+ nonpitting edema in R hand and R ankle    Lymphadenopathy:     He has no cervical adenopathy.   Neurological: A cranial nerve deficit is present. Coordination abnormal.   Pt has no sensation or movement in RUE or RLE.  Can move LUE but has poor motor control   Can move LLE but cannot follow commands like wiggling toes  Pt has expressive aphasia   Skin: Skin is warm and dry. No rash noted. No erythema.         Review of Systems   Reason unable to perform ROS: Pt unable to answer questions because of expressive aphasia. Pt did deny any chest pain, abdominal pain or difficulty breathing.        Physical Exam   Constitutional: He appears well-developed and well-nourished.   HENT:   Head: Normocephalic and atraumatic.   Right sided facial drooping   Eyes:   EOM abnormal- unable to look in different directions without turning head   Neck: Normal range of motion. Neck supple. No JVD present.   Cardiovascular: Normal rate and normal heart sounds. Exam reveals no gallop and no friction rub.   No murmur heard.  No carotid bruits   Pulmonary/Chest: Effort normal and breath sounds normal. No respiratory distress. He exhibits no tenderness.   Decreased breath sounds in LL b/l   Abdominal: Soft. Bowel sounds are normal. He exhibits no distension. There is tenderness.   Diffuse abdominal tenderness to palpation   Musculoskeletal: He exhibits edema. He exhibits no tenderness or deformity.   2+ nonpitting edema in R hand and R ankle   Lymphadenopathy:     He has no cervical adenopathy.   Neurological: A cranial nerve deficit is present. Coordination abnormal.   Pt has no sensation or movement in RUE or RLE.  Can move LUE but has poor motor control   Can move LLE but cannot follow commands like wiggling toes  Pt has expressive aphasia   Skin: Skin is warm and dry. No rash noted. No erythema.

## 2019-07-22 NOTE — PLAN OF CARE
Problem: Occupational Therapy Goal  Goal: Occupational Therapy Goal  Goals set 7/15 to be met by 7/29   Patient will increase functional independence with ADLs by performing:    UE Dressing with Minimal Assistance with hemiplegic technique.  LE Dressing with Minimal Assistance.  Grooming while standing with Contact Guard Assistance.   Toileting from bedside commode with Minimal Assistance for hygiene and clothing management.   Rolling to Left with Contact Guard Assistance.   Rolling to Right with Supervision.   Stand pivot transfers with Contact Guard Assistance.  Patient/Caregivers will be independent in assisting in bed mobility/positioning  Patient/Caregivers will be independent in HEP for weightbearing with RUE, PROM of RUE.       Goals remain appropriate.  JONNY Rosario  7/22/2019

## 2019-07-22 NOTE — ASSESSMENT & PLAN NOTE
Pt is 50yo M w/ PMH afib s/p watchman in 2017 who presented w/ stroke d/t L MCA embolism s/p tPA.     Plan:  - Carotid U/S to evaluate for possible source of emboli showed no stenosis or occlusion  - F/u ABDIAS with bubble study to assess watchman placement, evaluate for thrombus and evaluate for patent ASD  - Continue holding anticoagulation  - Suggest antiplatelet therapy per guidelines (ASA monotherapy since pt had major stroke)- consider holding this until Plts are increased

## 2019-07-23 PROBLEM — I63.412 CEREBRAL INFARCTION DUE TO EMBOLISM OF LEFT MIDDLE CEREBRAL ARTERY: Status: ACTIVE | Noted: 2019-07-23

## 2019-07-23 PROBLEM — R13.10 DYSPHAGIA: Status: ACTIVE | Noted: 2019-07-23

## 2019-07-23 LAB
ALBUMIN SERPL BCP-MCNC: 2.5 G/DL (ref 3.5–5.2)
ALP SERPL-CCNC: 64 U/L (ref 55–135)
ALT SERPL W/O P-5'-P-CCNC: 83 U/L (ref 10–44)
ANION GAP SERPL CALC-SCNC: 7 MMOL/L (ref 8–16)
ANISOCYTOSIS BLD QL SMEAR: SLIGHT
AORTIC ROOT ANNULUS: 4 CM
ASCENDING AORTA: 4 CM
AST SERPL-CCNC: 79 U/L (ref 10–40)
BASOPHILS # BLD AUTO: 0.07 K/UL (ref 0–0.2)
BASOPHILS NFR BLD: 0.9 % (ref 0–1.9)
BILIRUB SERPL-MCNC: 0.4 MG/DL (ref 0.1–1)
BSA FOR ECHO PROCEDURE: 2.19 M2
BUN SERPL-MCNC: 7 MG/DL (ref 6–20)
CALCIUM SERPL-MCNC: 8.4 MG/DL (ref 8.7–10.5)
CHLORIDE SERPL-SCNC: 109 MMOL/L (ref 95–110)
CO2 SERPL-SCNC: 24 MMOL/L (ref 23–29)
CREAT SERPL-MCNC: 0.8 MG/DL (ref 0.5–1.4)
DIFFERENTIAL METHOD: ABNORMAL
EOSINOPHIL # BLD AUTO: 0.5 K/UL (ref 0–0.5)
EOSINOPHIL NFR BLD: 5.9 % (ref 0–8)
ERYTHROCYTE [DISTWIDTH] IN BLOOD BY AUTOMATED COUNT: 13.4 % (ref 11.5–14.5)
EST. GFR  (AFRICAN AMERICAN): >60 ML/MIN/1.73 M^2
EST. GFR  (NON AFRICAN AMERICAN): >60 ML/MIN/1.73 M^2
GLUCOSE SERPL-MCNC: 84 MG/DL (ref 70–110)
HCT VFR BLD AUTO: 36.1 % (ref 40–54)
HGB BLD-MCNC: 11.7 G/DL (ref 14–18)
IMM GRANULOCYTES # BLD AUTO: 0.09 K/UL (ref 0–0.04)
IMM GRANULOCYTES NFR BLD AUTO: 1.2 % (ref 0–0.5)
LYMPHOCYTES # BLD AUTO: 1.1 K/UL (ref 1–4.8)
LYMPHOCYTES NFR BLD: 14 % (ref 18–48)
MAGNESIUM SERPL-MCNC: 1.9 MG/DL (ref 1.6–2.6)
MCH RBC QN AUTO: 32.1 PG (ref 27–31)
MCHC RBC AUTO-ENTMCNC: 32.4 G/DL (ref 32–36)
MCV RBC AUTO: 99 FL (ref 82–98)
MONOCYTES # BLD AUTO: 0.7 K/UL (ref 0.3–1)
MONOCYTES NFR BLD: 9.1 % (ref 4–15)
NEUTROPHILS # BLD AUTO: 5.3 K/UL (ref 1.8–7.7)
NEUTROPHILS NFR BLD: 68.9 % (ref 38–73)
NRBC BLD-RTO: 0 /100 WBC
PHOSPHATE SERPL-MCNC: 3.5 MG/DL (ref 2.7–4.5)
PISA TR MAX VEL: 1.88 M/S
PLATELET # BLD AUTO: 92 K/UL (ref 150–350)
PLATELET BLD QL SMEAR: ABNORMAL
PMV BLD AUTO: 11.6 FL (ref 9.2–12.9)
POCT GLUCOSE: 53 MG/DL (ref 70–110)
POCT GLUCOSE: 85 MG/DL (ref 70–110)
POCT GLUCOSE: 94 MG/DL (ref 70–110)
POCT GLUCOSE: 94 MG/DL (ref 70–110)
POLYCHROMASIA BLD QL SMEAR: ABNORMAL
POTASSIUM SERPL-SCNC: 4.1 MMOL/L (ref 3.5–5.1)
PROT SERPL-MCNC: 5.6 G/DL (ref 6–8.4)
RBC # BLD AUTO: 3.65 M/UL (ref 4.6–6.2)
SODIUM SERPL-SCNC: 140 MMOL/L (ref 136–145)
TR MAX PG: 14 MMHG
WBC # BLD AUTO: 7.76 K/UL (ref 3.9–12.7)

## 2019-07-23 PROCEDURE — D9220A PRA ANESTHESIA: ICD-10-PCS | Mod: ANES,,, | Performed by: ANESTHESIOLOGY

## 2019-07-23 PROCEDURE — 63600175 PHARM REV CODE 636 W HCPCS: Performed by: NURSE ANESTHETIST, CERTIFIED REGISTERED

## 2019-07-23 PROCEDURE — 99223 1ST HOSP IP/OBS HIGH 75: CPT | Mod: ,,, | Performed by: INTERNAL MEDICINE

## 2019-07-23 PROCEDURE — 20000000 HC ICU ROOM

## 2019-07-23 PROCEDURE — 85025 COMPLETE CBC W/AUTO DIFF WBC: CPT

## 2019-07-23 PROCEDURE — 99233 SBSQ HOSP IP/OBS HIGH 50: CPT | Mod: ,,, | Performed by: PSYCHIATRY & NEUROLOGY

## 2019-07-23 PROCEDURE — 99233 PR SUBSEQUENT HOSPITAL CARE,LEVL III: ICD-10-PCS | Mod: ,,, | Performed by: PSYCHIATRY & NEUROLOGY

## 2019-07-23 PROCEDURE — D9220A PRA ANESTHESIA: ICD-10-PCS | Mod: CRNA,,, | Performed by: NURSE ANESTHETIST, CERTIFIED REGISTERED

## 2019-07-23 PROCEDURE — 25000003 PHARM REV CODE 250: Performed by: NURSE PRACTITIONER

## 2019-07-23 PROCEDURE — 84100 ASSAY OF PHOSPHORUS: CPT

## 2019-07-23 PROCEDURE — D9220A PRA ANESTHESIA: Mod: ANES,,, | Performed by: ANESTHESIOLOGY

## 2019-07-23 PROCEDURE — 92507 TX SP LANG VOICE COMM INDIV: CPT

## 2019-07-23 PROCEDURE — 83735 ASSAY OF MAGNESIUM: CPT

## 2019-07-23 PROCEDURE — 99223 PR INITIAL HOSPITAL CARE,LEVL III: ICD-10-PCS | Mod: ,,, | Performed by: INTERNAL MEDICINE

## 2019-07-23 PROCEDURE — D9220A PRA ANESTHESIA: Mod: CRNA,,, | Performed by: NURSE ANESTHETIST, CERTIFIED REGISTERED

## 2019-07-23 PROCEDURE — 97530 THERAPEUTIC ACTIVITIES: CPT

## 2019-07-23 PROCEDURE — 25000003 PHARM REV CODE 250: Performed by: PSYCHIATRY & NEUROLOGY

## 2019-07-23 PROCEDURE — 97535 SELF CARE MNGMENT TRAINING: CPT

## 2019-07-23 PROCEDURE — 80053 COMPREHEN METABOLIC PANEL: CPT

## 2019-07-23 PROCEDURE — 25000003 PHARM REV CODE 250: Performed by: PHYSICIAN ASSISTANT

## 2019-07-23 PROCEDURE — 92526 ORAL FUNCTION THERAPY: CPT

## 2019-07-23 PROCEDURE — 25000003 PHARM REV CODE 250: Performed by: NURSE ANESTHETIST, CERTIFIED REGISTERED

## 2019-07-23 RX ORDER — NITROGLYCERIN 5 MG/ML
INJECTION, SOLUTION INTRAVENOUS
Status: DISCONTINUED | OUTPATIENT
Start: 2019-07-23 | End: 2019-07-23

## 2019-07-23 RX ORDER — PROPOFOL 10 MG/ML
VIAL (ML) INTRAVENOUS
Status: DISCONTINUED | OUTPATIENT
Start: 2019-07-23 | End: 2019-07-23

## 2019-07-23 RX ORDER — SODIUM CHLORIDE 0.9 % (FLUSH) 0.9 %
10 SYRINGE (ML) INJECTION
Status: DISCONTINUED | OUTPATIENT
Start: 2019-07-23 | End: 2019-07-24

## 2019-07-23 RX ORDER — LIDOCAINE HCL/PF 100 MG/5ML
SYRINGE (ML) INTRAVENOUS
Status: DISCONTINUED | OUTPATIENT
Start: 2019-07-23 | End: 2019-07-23

## 2019-07-23 RX ORDER — NAPROXEN SODIUM 220 MG/1
81 TABLET, FILM COATED ORAL DAILY
Status: DISCONTINUED | OUTPATIENT
Start: 2019-07-23 | End: 2019-07-26

## 2019-07-23 RX ORDER — LIDOCAINE HYDROCHLORIDE 20 MG/ML
SOLUTION OROPHARYNGEAL
Status: DISCONTINUED | OUTPATIENT
Start: 2019-07-23 | End: 2019-07-23

## 2019-07-23 RX ORDER — PROPOFOL 10 MG/ML
VIAL (ML) INTRAVENOUS CONTINUOUS PRN
Status: DISCONTINUED | OUTPATIENT
Start: 2019-07-23 | End: 2019-07-23

## 2019-07-23 RX ORDER — GLYCOPYRROLATE 0.2 MG/ML
INJECTION INTRAMUSCULAR; INTRAVENOUS
Status: DISCONTINUED | OUTPATIENT
Start: 2019-07-23 | End: 2019-07-23

## 2019-07-23 RX ADMIN — LISINOPRIL 20 MG: 20 TABLET ORAL at 10:07

## 2019-07-23 RX ADMIN — ACETAMINOPHEN 650 MG: 325 TABLET ORAL at 11:07

## 2019-07-23 RX ADMIN — ACETAMINOPHEN 650 MG: 325 TABLET ORAL at 03:07

## 2019-07-23 RX ADMIN — ATORVASTATIN CALCIUM 40 MG: 20 TABLET, FILM COATED ORAL at 10:07

## 2019-07-23 RX ADMIN — NITROGLYCERIN 25 MCG: 5 INJECTION, SOLUTION INTRAVENOUS at 08:07

## 2019-07-23 RX ADMIN — NITROGLYCERIN 25 MCG: 5 INJECTION, SOLUTION INTRAVENOUS at 09:07

## 2019-07-23 RX ADMIN — SODIUM CHLORIDE, SODIUM GLUCONATE, SODIUM ACETATE, POTASSIUM CHLORIDE, MAGNESIUM CHLORIDE, SODIUM PHOSPHATE, DIBASIC, AND POTASSIUM PHOSPHATE: .53; .5; .37; .037; .03; .012; .00082 INJECTION, SOLUTION INTRAVENOUS at 08:07

## 2019-07-23 RX ADMIN — ASPIRIN 81 MG CHEWABLE TABLET 81 MG: 81 TABLET CHEWABLE at 12:07

## 2019-07-23 RX ADMIN — GLYCOPYRROLATE 0.2 MG: 0.2 INJECTION, SOLUTION INTRAMUSCULAR; INTRAVENOUS at 08:07

## 2019-07-23 RX ADMIN — LIDOCAINE HYDROCHLORIDE 100 MG: 20 INJECTION, SOLUTION INTRAVENOUS at 08:07

## 2019-07-23 RX ADMIN — PROPOFOL 20 MG: 10 INJECTION, EMULSION INTRAVENOUS at 09:07

## 2019-07-23 RX ADMIN — PROPOFOL 70 MG: 10 INJECTION, EMULSION INTRAVENOUS at 08:07

## 2019-07-23 RX ADMIN — LIDOCAINE HYDROCHLORIDE 10 ML: 20 SOLUTION OROPHARYNGEAL at 08:07

## 2019-07-23 RX ADMIN — PROPOFOL 200 MCG/KG/MIN: 10 INJECTION, EMULSION INTRAVENOUS at 08:07

## 2019-07-23 RX ADMIN — PROPOFOL 20 MG: 10 INJECTION, EMULSION INTRAVENOUS at 08:07

## 2019-07-23 NOTE — ASSESSMENT & PLAN NOTE
Patient has watch vargas device when he was in . His wife states that he received anticoagulation before the device placement with no reported side effects    Afib is a Stroke risk factor  · Rate control  CHADVASC score: 2  HAS BLED score: 2    TTE (07/14/2019):   Normal left ventricular systolic function. The estimated ejection fraction is 60%  · Normal LV diastolic function.  · Normal right ventricular systolic function.  · Mild tricuspid regurgitation.  · Intermediate central venous pressure (8 mm Hg).  · The estimated PA systolic pressure is 33 mm Hg    ABDIAS (7/23/2019):    Left atrial appendage with a well seated 21mm Watchman device. No peridevice leak on color doppler. No thrombus visualized.  · No intra-atrial shunt on bubble study with positive valsalva  · No Valvular thrombus, mass or vegation visualized  · Normal left ventricular systolic function. The estimated ejection fraction is 55%  · Normal right ventricular systolic function.  · Moderate size Left pleural effusion    Needs anticoagulation - Hold anticoagulation in the setting of thrombocytopenia.  Start ASA monotherapy. Will watch the PLTs for the next few days if stable or improving will consider DAPT vs NOAC.  Consult hematology for thrombocytopenia  Consider abdominal US

## 2019-07-23 NOTE — ASSESSMENT & PLAN NOTE
-SLP following   -concerns of aspiration   -diet mechanical soft, nectar thick liquids   -consider modified speech evaluation

## 2019-07-23 NOTE — PLAN OF CARE
SW advised by CM that the Pt has changed first preference to [facility]. Sent note via RC to [place].    Elsi Donahue LMSW  Neurocritical Care   [facility]  [id number]

## 2019-07-23 NOTE — ANESTHESIA POSTPROCEDURE EVALUATION
Anesthesia Post Evaluation    Patient: Justyn Rawls    Procedure(s) Performed: Procedure(s) (LRB):  ECHOCARDIOGRAM, TRANSESOPHAGEAL (N/A)    Final Anesthesia Type: general  Patient location during evaluation: ICU  Patient participation: Yes- Able to Participate  Level of consciousness: awake and alert and oriented  Post-procedure vital signs: reviewed and stable  Pain management: adequate  Airway patency: patent  PONV status at discharge: No PONV  Anesthetic complications: no      Cardiovascular status: blood pressure returned to baseline, hemodynamically stable and stable  Respiratory status: unassisted, room air and spontaneous ventilation  Hydration status: euvolemic  Follow-up not needed.          Vitals Value Taken Time   /65 7/23/2019 11:32 AM   Temp 36.8 °C (98.2 °F) 7/23/2019 11:05 AM   Pulse 63 7/23/2019 11:55 AM   Resp 16 7/23/2019 11:55 AM   SpO2 94 % 7/23/2019 11:55 AM   Vitals shown include unvalidated device data.      No case tracking events are documented in the log.      Pain/Gill Score: Pain Rating Prior to Med Admin: 5 (7/23/2019 11:09 AM)

## 2019-07-23 NOTE — PLAN OF CARE
Problem: Adult Inpatient Plan of Care  Goal: Plan of Care Review  Outcome: Ongoing (interventions implemented as appropriate)  POC reviewed with pt and wife who is at bedside. She verbalized understanding as pt is expressive aphasic. Questions and concerns addressed. No acute events overnight. VSS. HR would flash to 150's for ~10 seconds multiple times throughout shift, but unsustained.Returned to NSR range. Asymptomatic. Pt remained NPO during shift for possible ABDIAS procedure later today. Family aware pt is an add on procedure. Pt progressing toward goals. Will continue to monitor. See flowsheets for full assessment and VS info
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
## 2019-07-23 NOTE — ASSESSMENT & PLAN NOTE
Pt is 49yoM w/ HTN and A fib s/p watchman who presented w/ L MCA stroke. Pt's BP 140s-160s/50s-80s.    Plan:  -Continue to uptitrate lisinopril to BP goals per neuro

## 2019-07-23 NOTE — NURSING
Patient returned from echo lab, S/P ABDIAS. Transported by bed with continuous cardiac monitor, accompanied by RN and CRNA. Patient is awake and alert.

## 2019-07-23 NOTE — PLAN OF CARE
Problem: SLP Goal  Goal: SLP Goal  Speech Language Pathology Goals  Updated Goals expected to be met by 7/29    1. Pt will participate in ongoing assessment of swallow to determine the least restrictive diet.  2. Pt will participate in ongoing assessment of speech language and cognitive linguistic skills to help rule out deficits and determine therapeutic plan of care   Updated additional goals:   1. Pt will participate in simple visual scanning tasks with 80% accuracy and mod cues.  2. Pt will complete OMEs x10 with SLP and independently   3. Pt will complete orientation tasks with 80% accuracy and mod cues.  4. Pt will complete simple naming tasks with 50% accuracy and mod cues.   5. Pt will follow simple two step commands with 50% accuracy and mod cues.  6. Pt will complete automatic speech tasks with 80% accuracy and mod cues.  7. Pt will answer Y/N questions with 60% accuracy and mod cues.   8. Pt will participate in ongoing assessment of reading and writing skills.        Outcome: Ongoing (interventions implemented as appropriate)  Rec mechanical soft diet with nectar thick liquids with strict aspiration precautions.  Please monitor for overt s/s aspiration with all meals.  Modified barium swallow study recommended to further swallow safety as pt with inconsistent reports of globus sensation and throat clear across consistencies.  Spouse reported coughing with meals over weekend though denied any s/s aspiration overnight.   Spouse states throat clear at baseline though SLP unable to r/o aspiration.  ARTEMIO Shafer, CCC/SLP  7/23/2019

## 2019-07-23 NOTE — ASSESSMENT & PLAN NOTE
1. ABDIAS for evaluation of JM thrombus for watchman evaluation    -No absolute contraindications of esophageal stricture, tumor, perforation, laceration,or diverticulum and/or active GI bleed  -The risks, benefits & alternatives of the procedure were explained to the patient.   -The risks of transesophageal echo include but are not limited to:  Dental trauma, esophageal trauma/perforation, bleeding, laryngospasm/brochospasm, aspiration, sore throat/hoarseness, & dislodgement of the endotracheal tube/nasogastric tube (where applicable).    -The risks of moderate sedation include hypotension, respiratory depression, arrhythmias, bronchospasm, & death.    -Informed consent was obtained. The patient is agreeable to proceed with the procedure and all questions and concerns addressed.    Case discussed with an attending in echocardiography lab.     Further recommendations per attending addendum

## 2019-07-23 NOTE — PLAN OF CARE
Problem: Occupational Therapy Goal  Goal: Occupational Therapy Goal  Goals set 7/15 to be met by 7/29   Patient will increase functional independence with ADLs by performing:    UE Dressing with Minimal Assistance with hemiplegic technique.  LE Dressing with Minimal Assistance.  Grooming while standing with Contact Guard Assistance.   Toileting from bedside commode with Minimal Assistance for hygiene and clothing management.   Rolling to Left with Contact Guard Assistance.   Rolling to Right with Supervision.   Stand pivot transfers with Contact Guard Assistance.  Patient/Caregivers will be independent in assisting in bed mobility/positioning  Patient/Caregivers will be independent in HEP for weightbearing with RUE, PROM of RUE.       Goals remain appropriate.  JONNY Rsoario  7/23/2019

## 2019-07-23 NOTE — SUBJECTIVE & OBJECTIVE
Interval History:  ABDIAS completed no thrombus seen on exam,  Started on 81 ASA.        Review of Systems   Constitutional: Negative for fever.   HENT: Positive for trouble swallowing.    Eyes: Negative for visual disturbance.   Respiratory: Negative for chest tightness and shortness of breath.    Cardiovascular: Negative for chest pain.   Gastrointestinal: Negative for diarrhea, nausea and vomiting.   Genitourinary: Negative for difficulty urinating.   Musculoskeletal: Negative for neck pain and neck stiffness.   Skin: Negative for color change.   Neurological: Positive for facial asymmetry, speech difficulty, weakness and headaches. Negative for dizziness, tremors, syncope, light-headedness and numbness.   Psychiatric/Behavioral: Negative for agitation and behavioral problems.     Objective:     Vitals:  Temp: 98.9 °F (37.2 °C)  Pulse: 67  Rhythm: normal sinus rhythm  BP: 124/67  MAP (mmHg): 90  Resp: 16  SpO2: 100 %  O2 Device (Oxygen Therapy): nasal cannula    Temp  Min: 97.7 °F (36.5 °C)  Max: 98.9 °F (37.2 °C)  Pulse  Min: 44  Max: 80  BP  Min: 108/65  Max: 172/94  MAP (mmHg)  Min: 79  Max: 127  Resp  Min: 11  Max: 23  SpO2  Min: 94 %  Max: 100 %    07/22 0701 - 07/23 0700  In: 70 [P.O.:60; I.V.:10]  Out: 1600 [Urine:1600]   Unmeasured Output  Urine Occurrence: 0  Stool Occurrence: 0  Emesis Occurrence: 1  Pad Count: 4     Physical Exam   Constitutional: He appears well-developed and well-nourished.   HENT:   Head: Normocephalic and atraumatic.   Neck: Normal range of motion.   Cardiovascular: Regular rhythm. Bradycardia present.   Pulmonary/Chest: Effort normal and breath sounds normal.   Abdominal: Soft. Normal appearance.   Neurological:   AAOx3, follow commands   E4V5M6 GCS (15)   PERRLA   EOMi   Word finding difficulty   R facial droop   RSW   RUE-1/5  RLE-4/5   LUE-5/5  LLE-5/5   Decreased sensation on R-side     Medications:  Continuous Scheduled  aspirin 81 mg Daily   atorvastatin 40 mg Daily    lisinopril 20 mg Daily   senna-docusate 8.6-50 mg 1 tablet BID   PRN  acetaminophen 650 mg Q6H PRN   atropine 1 mg Q2H PRN   hydrALAZINE 10 mg Q4H PRN   magnesium oxide 800 mg PRN   magnesium oxide 800 mg PRN   ondansetron 8 mg Q8H PRN   potassium chloride 10% 40 mEq PRN   potassium chloride 10% 40 mEq PRN   potassium chloride 10% 40 mEq PRN   potassium, sodium phosphates 2 packet PRN   potassium, sodium phosphates 2 packet PRN   potassium, sodium phosphates 2 packet PRN   sodium chloride 0.9% 10 mL PRN   sodium chloride 0.9% 10 mL PRN     Today I personally reviewed pertinent medications, lines/drains/airways, imaging, cardiology results, laboratory results, microbiology results, notably:    Diet  Diet Dysphagia Mechanical Soft (IDDSI Level 5) [facility] [place] Thick  Diet Dysphagia Mechanical Soft (IDDSI Level 5) [facility]; [place] Thick

## 2019-07-23 NOTE — PLAN OF CARE
Problem: Adult Inpatient Plan of Care  Goal: Plan of Care Review  Outcome: Ongoing (interventions implemented as appropriate)  POC reviewed with pt and family at 1400.  Questions and concerns addressed with family. No acute events today. ABDIAS done. Cleared per SLP to have dysphagia mechanical soft diet with nectar thick liquids. Medicated x 2 with Tylenol for headache. Pt progressing toward goals. Will continue to monitor. Patient is pending transfer to step- down unit. See flowsheets for full assessment and VS info.

## 2019-07-23 NOTE — CONSULTS
[facility]  Cardiology  Consult Note    Patient Name: Justyn Rawls  MRN: [id number]  Admission Date: 7/14/2019  Hospital Length of Stay: 9 days  Code Status: Full Code   Attending Provider: Ernesto Duran MD   Consulting Provider: Carol Gupta MD  Primary Care Physician: No primary care provider on file.  Principal Problem:Stroke due to embolism of left middle cerebral artery    Patient information was obtained from patient and ER records.     Consults  Subjective:     Chief Complaint:  Watchman device     HPI:   Pt is 49yoM w/ PMH L sided occipital and temporal CVA (2014), a fib s/p Watchman device (2017), and alcohol abuse who presented w/ stroke d/t L MCA embolism s/p tPA. MRI showed infarct in L MCA, superimposed petechial hemorrhage in L basal ganglia, insula, frontal, and parietal lobes, and localized mass effect. TTE on 7/14 showed EF 60% w/ normal systolic and diastolic function.     Dysphagia or odynophagia:   no  Liver Disease, esophageal disease, or known varices:   no  Upper GI Bleeding:no  Snoring:   no  Sleep Apnea:   no  Prior neck surgery or radiation:   no  History of anesthetic difficulties:   no  Family history of anesthetic difficulties:   no  Last oral intake:  12 hours ago  Able to move neck in all directions:  Yes  Anticoagulation/Antiplatelets: None  Platelet count: 104  INR: 1.1      Past Medical History:   Diagnosis Date    CVA (cerebral infarction) 4/22/2016    Paroxysmal atrial fibrillation 4/22/2016    Stroke        Past Surgical History:   Procedure Laterality Date    watchman device         Review of patient's allergies indicates:  No Known Allergies    No current facility-administered medications on file prior to encounter.      No current outpatient medications on file prior to encounter.     Family History     Problem Relation (Age of Onset)    Cancer Father    Early death Father    Hyperlipidemia Mother    Hypertension Mother        Tobacco Use     Smoking status: Never Smoker    Smokeless tobacco: Never Used   Substance and Sexual Activity    Alcohol use: Yes     Comment: 2 times per month    Drug use: No    Sexual activity: Yes     Review of Systems   Reason unable to perform ROS: Pt unable to answer questions because of expressive aphasia. Pt did deny any chest pain, abdominal pain or difficulty breathing.      Objective:     Vital Signs (Most Recent):  Temp: 98.7 °F (37.1 °C) (07/23/19 0305)  Pulse: (!) 57 (07/23/19 0605)  Resp: 11 (07/23/19 0605)  BP: (!) 154/88 (07/23/19 0605)  SpO2: 97 % (07/23/19 0605) Vital Signs (24h Range):  Temp:  [97.7 °F (36.5 °C)-99.3 °F (37.4 °C)] 98.7 °F (37.1 °C)  Pulse:  [46-82] 57  Resp:  [11-29] 11  SpO2:  [92 %-99 %] 97 %  BP: (117-174)/(59-94) 154/88     Weight: 89.4 kg (197 lb)  Body mass index is 23.98 kg/m².    SpO2: 97 %  O2 Device (Oxygen Therapy): room air      Intake/Output Summary (Last 24 hours) at 7/23/2019 0657  Last data filed at 7/23/2019 0605  Gross per 24 hour   Intake 70 ml   Output 1600 ml   Net -1530 ml       Lines/Drains/Airways     Peripheral Intravenous Line                 Peripheral IV - Single Lumen 07/22/19 1249 22 G Right Hand less than 1 day         Peripheral IV - Single Lumen 07/22/19 1551 20 G Left Hand less than 1 day                Physical Exam   Constitutional: He appears well-developed and well-nourished.   HENT:   Head: Normocephalic and atraumatic.   Right sided facial drooping   Eyes:   EOM abnormal- unable to look in different directions without turning head   Neck: Normal range of motion. Neck supple. No JVD present.   Cardiovascular: Normal rate and normal heart sounds. Exam reveals no gallop and no friction rub.   No murmur heard.  No carotid bruits   Pulmonary/Chest: Effort normal and breath sounds normal. No respiratory distress. He exhibits no tenderness.   Decreased breath sounds in LL b/l   Abdominal: Soft. Bowel sounds are normal. He exhibits no distension. There is  tenderness.   Diffuse abdominal tenderness to palpation   Musculoskeletal: He exhibits edema. He exhibits no tenderness or deformity.   2+ nonpitting edema in R hand and R ankle   Lymphadenopathy:     He has no cervical adenopathy.   Neurological: A cranial nerve deficit is present. Coordination abnormal.   Pt has no sensation or movement in RUE or RLE.  Can move LUE but has poor motor control   Can move LLE but cannot follow commands like wiggling toes  Pt has expressive aphasia   Skin: Skin is warm and dry. No rash noted. No erythema.       Significant Labs:   CMP   Recent Labs   Lab 07/22/19  0135      K 3.6      CO2 24      BUN 9   CREATININE 0.8   CALCIUM 8.1*   PROT 5.3*   ALBUMIN 2.3*   BILITOT 0.3   ALKPHOS 55   AST 44*   ALT 39   ANIONGAP 8   ESTGFRAFRICA >60.0   EGFRNONAA >60.0   , CBC   Recent Labs   Lab 07/22/19 0135 07/23/19  0609   WBC 6.70 7.76   HGB 11.4* 11.7*   HCT 34.9* 36.1*   *  --    , INR No results for input(s): INR, PROTIME in the last 48 hours. and Lipid Panel No results for input(s): CHOL, HDL, LDLCALC, TRIG, CHOLHDL in the last 48 hours.    Significant Imaging: Echocardiogram:   Transthoracic echo (TTE) complete (Cupid Only):   Results for orders placed or performed during the hospital encounter of 07/14/19   Transthoracic echo (TTE) 2D with Color Flow   Result Value Ref Range    Ascending aorta 3.83 cm    STJ 3.25 cm    AV mean gradient 5 mmHg    Ao peak brant 1.38 m/s    Ao VTI 27.00 cm    IVRT 0.09 msec    IVS 0.65 0.6 - 1.1 cm    LA size 3.19 cm    Left Atrium Major Axis 5.05 cm    Left Atrium Minor Axis 5.15 cm    LVIDD 5.00 3.5 - 6.0 cm    LVIDS 3.19 2.1 - 4.0 cm    LVOT diameter 2.46 cm    LVOT peak VTI 22.83 cm    PW 0.68 0.6 - 1.1 cm    MV Peak A Brant 0.46 m/s    E wave decelartion time 155.75 msec    MV Peak E Brant 1.02 m/s    PV Peak D Brant 0.38 m/s    PV Peak S Brant 0.47 m/s    RA Major Axis 5.00 cm    RA Width 3.67 cm    RVDD 3.81 cm    Sinus 3.80 cm     TAPSE 2.58 cm    TR Max Brant 2.51 m/s    TDI LATERAL 0.13 m/s    TDI SEPTAL 0.13 m/s    LA WIDTH 4.09 cm    LV Diastolic Volume 118.07 mL    LV Systolic Volume 40.65 mL    RV S' 15.92 cm/s    LVOT peak brant 1.18 m/s    LV LATERAL E/E' RATIO 7.85 m/s    LV SEPTAL E/E' RATIO 7.85 m/s    FS 36 %    LA volume 56.55 cm3    LV mass 107.63 g    Left Ventricle Relative Wall Thickness 0.27 cm    AV valve area 4.02 cm2    AV Velocity Ratio 0.86     AV index (prosthetic) 0.85     E/A ratio 2.22     Mean e' 0.13 m/s    Pulm vein S/D ratio 1.24     LVOT area 4.8 cm2    LVOT stroke volume 108.45 cm3    AV peak gradient 8 mmHg    E/E' ratio 7.85 m/s    LV Systolic Volume Index 18.5 mL/m2    LV Diastolic Volume Index 53.63 mL/m2    LA Volume Index 25.7 mL/m2    LV Mass Index 49 g/m2    Triscuspid Valve Regurgitation Peak Gradient 25 mmHg    BSA 2.19 m2    Right Atrial Pressure (from IVC) 8 mmHg    TV rest pulmonary artery pressure 33 mmHg    Narrative    · Normal left ventricular systolic function. The estimated ejection   fraction is 60%  · Normal LV diastolic function.  · Normal right ventricular systolic function.  · Mild tricuspid regurgitation.  · Intermediate central venous pressure (8 mm Hg).  · The estimated PA systolic pressure is 33 mm Hg        Assessment and Plan:       PAF (paroxysmal atrial fibrillation)  1. ABDIAS for evaluation of JM thrombus for watchman evaluation    -No absolute contraindications of esophageal stricture, tumor, perforation, laceration,or diverticulum and/or active GI bleed  -The risks, benefits & alternatives of the procedure were explained to the patient.   -The risks of transesophageal echo include but are not limited to:  Dental trauma, esophageal trauma/perforation, bleeding, laryngospasm/brochospasm, aspiration, sore throat/hoarseness, & dislodgement of the endotracheal tube/nasogastric tube (where applicable).    -The risks of moderate sedation include hypotension, respiratory depression,  arrhythmias, bronchospasm, & death.    -Informed consent was obtained. The patient is agreeable to proceed with the procedure and all questions and concerns addressed.    Case discussed with an attending in echocardiography lab.     Further recommendations per attending addendum          VTE Risk Mitigation (From admission, onward)        Ordered     IP VTE LOW RISK PATIENT  Once      07/14/19 0227     Place sequential compression device  Until discontinued      07/14/19 0227          Thank you for your consult. I will sign off. Please contact us if you have any additional questions.    Carol Gupta MD  Cardiology   [facility]

## 2019-07-23 NOTE — ASSESSMENT & PLAN NOTE
Patient presents with normal PLTs count and dropped while hospitalization could be heparin related  Consult hematology for thrombocytopenia  Consider abdominal US

## 2019-07-23 NOTE — PT/OT/SLP PROGRESS
"Speech Language Pathology Treatment    Patient Name:  Justyn Rawls   MRN:  [id number]  Admitting Diagnosis: Stroke due to embolism of left middle cerebral artery    Recommendations:                 General Recommendations:  Dysphagia therapy, Speech/language therapy and Cognitive-linguistic therapy, Modified Barium Swallow Study   Diet recommendations:  Mechanical soft, Liquid Diet Level: Nectar Thick   Aspiration Precautions: 1 bite/sip at a time, Feed only when awake/alert, HOB to 90 degrees, Meds crushed in puree, Monitor for s/s of aspiration, Small bites/sips and Strict aspiration precautions   General Precautions: Standard, aspiration, aphasia, fall  Communication strategies:  provide increased time to answer and offer binary choice when struggling to find words     Subjective   "It sticks" re:pt referring to thin liquids     Pain/Comfort:  · Pain Rating 1: 0/10  · Pain Rating Post-Intervention 1: 0/10    Objective:     Has the patient been evaluated by SLP for swallowing?   Yes  Keep patient NPO? No   Current Respiratory Status: room air      Pt asleep upon arrival however easily roused. No family present at bedside. Pt noted to be more participatory and alert this session. SLP presented pts family picture and asked him to name family members. Pt perseverated on "Calin" for each person and responded with "yes" only answer when asked Y/N questions about family. Pt completed auto speech tasks independently this date. Pt unable to complete confrontation naming tasks independently however correctly named 3/3 items with binary choice offering. Pt completed word finding task 3/4 with min cues. Divergent category task required binary choice offering to complete. Pt with continued circumlocutory speech and semantic/phonemic paraphasias however is demonstrating progress in sessions.    Pt offered PO trials of thin liquids via cup x3 and cyclical straw sips, NTL via cup sips x4, and puree via tsp x8. Pt demonstrated " one cough following thin liquid trial, inconsistent throat clearing not demonstrated prior to this date observed across all consistencies, and multiple swallows across all consistencies. Pt reported of inconsistent globus sensation across trials and stated NTL felt better than thin liquids.   SLP returned to pts room to discuss recs and educate family members. SLP discussing overt s/s aspiration, aspiration risks, recommendation for MBSS, and safest diet of npo. Family with concerns re: cont npo status and denied any overt clinical signs of aspiration overnight. Family reported pt w/ throat clearing at baseline. SLP educated family on the inability to rule out aspiration at bedside, possibility of cont soft diet w/ nectar thick liquids w/strict aspiration precautions and monitoring for overt s/s aspiration. SLP stressed need to hold all PO if overt s/s aspiration noted. Family in agreement; verbalized understanding via teach back method.  Team made aware of results and recs and in agreement. White board updated. Speech to continue to follow.    Assessment:     Justyn Rawls is a 49 y.o. male with an SLP diagnosis of Aphasia, Dysphagia, Dysarthria and Apraxia.      Goals:   Multidisciplinary Problems     SLP Goals        Problem: SLP Goal    Goal Priority Disciplines Outcome   SLP Goal     SLP Ongoing (interventions implemented as appropriate)   Description:  Speech Language Pathology Goals  Updated Goals expected to be met by 7/29    1. Pt will participate in ongoing assessment of swallow to determine the least restrictive diet.  2. Pt will participate in ongoing assessment of speech language and cognitive linguistic skills to help rule out deficits and determine therapeutic plan of care   Updated additional goals:   1. Pt will participate in simple visual scanning tasks with 80% accuracy and mod cues.  2. Pt will complete OMEs x10 with SLP and independently   3. Pt will complete orientation tasks with 80%  accuracy and mod cues.  4. Pt will complete simple naming tasks with 50% accuracy and mod cues.   5. Pt will follow simple two step commands with 50% accuracy and mod cues.  6. Pt will complete automatic speech tasks with 80% accuracy and mod cues.  7. Pt will answer Y/N questions with 60% accuracy and mod cues.   8. Pt will participate in ongoing assessment of reading and writing skills.                         Plan:     · Patient to be seen:  4 x/week   · Plan of Care expires:     · Plan of Care reviewed with:  patient, family   · SLP Follow-Up:  Yes       Discharge recommendations:  rehabilitation facility   Barriers to Discharge:  Level of Skilled Assistance Needed      Time Tracking:     SLP Treatment Date:   07/23/19  Speech Start Time:  1034(11:24)  Speech Stop Time:  1056(11:34)     Speech Total Time (min):  22 min    Billable Minutes: Speech Therapy Individual 11, Treatment Swallowing Dysfunction 11 and Seld Care/Home Management Training 10    ALEX Conte  07/23/2019

## 2019-07-23 NOTE — ASSESSMENT & PLAN NOTE
48 y/o male with L MCA stroke due to M1 occlusion received IV TPA and thrombectomy to TICI 2C, probable due to afib    Antithrombotics: Need anticoagulation after TPA window    Statins: Lipitor 40 mg daily    Aggressive risk factor modification: A-Fib, alcohol     Rehab efforts: The patient has been evaluated by a stroke team provider and the therapy needs have been fully considered based off the presenting complaints and exam findings. The following therapy evaluations are needed: PT evaluate and treat, OT evaluate and treat, SLP evaluate and treat, PM&R evaluate for appropriate placement    Diagnostics ordered/pending: HgbA1C to assess blood glucose levels, Lipid Profile to assess cholesterol levels, MRI head without contrast to assess brain parenchyma, TTE to assess cardiac function/status     VTE prophylaxis: SCD's raquel began Heparin 5000 units sc Q8H on 7-15-10 at 0600 but held (07/19/2019) due to thrombocytopenia     BP parameters: Infarct: Post sucessful thrombectomy, SBP <140

## 2019-07-23 NOTE — SUBJECTIVE & OBJECTIVE
Interval History: Plts are >100 and stable. ABDIAS scheduled for today    Review of Systems   Cardiovascular: Negative for chest pain, dyspnea on exertion and leg swelling.   Respiratory: Negative for cough and shortness of breath.    Hematologic/Lymphatic: Does not bruise/bleed easily.   Musculoskeletal: Positive for muscle weakness.        Right sided weakness   Gastrointestinal: Negative for abdominal pain, nausea and vomiting.     Objective:     Vital Signs (Most Recent):  Temp: 98.9 °F (37.2 °C) (07/23/19 0705)  Pulse: 61 (07/23/19 0805)  Resp: (!) 23 (07/23/19 0805)  BP: (!) 153/87 (07/23/19 0805)  SpO2: 98 % (07/23/19 0805) Vital Signs (24h Range):  Temp:  [97.7 °F (36.5 °C)-99.3 °F (37.4 °C)] 98.9 °F (37.2 °C)  Pulse:  [44-82] 61  Resp:  [11-27] 23  SpO2:  [92 %-99 %] 98 %  BP: (117-174)/(59-94) 153/87     Weight: 89.4 kg (197 lb)  Body mass index is 23.98 kg/m².     SpO2: 98 %  O2 Device (Oxygen Therapy): room air      Intake/Output Summary (Last 24 hours) at 7/23/2019 0857  Last data filed at 7/23/2019 0805  Gross per 24 hour   Intake 70 ml   Output 1900 ml   Net -1830 ml       Lines/Drains/Airways     Peripheral Intravenous Line                 Peripheral IV - Single Lumen 07/22/19 1249 22 G Right Hand less than 1 day         Peripheral IV - Single Lumen 07/22/19 1551 20 G Left Hand less than 1 day                Physical Exam   Constitutional: He appears well-developed and well-nourished.   HENT:   Head: Normocephalic and atraumatic.   Right sided facial drooping   Eyes:   EOM abnormal- unable to look in different directions without turning head   Neck: Normal range of motion. Neck supple. No JVD present.   Cardiovascular: Normal rate and normal heart sounds. Exam reveals no gallop and no friction rub.   No murmur heard.  No carotid bruits   Pulmonary/Chest: Effort normal and breath sounds normal. No respiratory distress. He exhibits no tenderness.   Abdominal: Soft. Bowel sounds are normal. He exhibits no  distension. There is no tenderness.   Diffuse abdominal tenderness to palpation   Musculoskeletal: He exhibits no edema, tenderness or deformity.   Lymphadenopathy:     He has no cervical adenopathy.   Neurological: A cranial nerve deficit is present. Coordination abnormal.   Pt has decreased sensation and no movement in RUE.  Pt has intact sensation but decreased motor control in RLE  Can move LUE and has improved poor motor control   Can move LLE but cannot follow commands like wiggling toes  Pt has expressive aphasia   Skin: Skin is warm and dry. No rash noted. No erythema.

## 2019-07-23 NOTE — PLAN OF CARE
Problem: Physical Therapy Goal  Goal: Physical Therapy Goal  PT goals until 8/2/19    1. Pt supine to sit with minimal assist- MET 7/19  2. Pt sit to supine with minimal assist-not met  3. Pt sit to stand with LRAD with minimal assist-not met  4. Pt to perform gait 20ft with LRAD with max assist.-not met  5. Pt to go up/down curb step with LRAD with max assist.-not met  6. Pt to transfer bed to/from bedside chair with moderate assist.-not met  7. Pt to perform B LE exs in sitting or supine x 20 reps to strengthen B LE to improve functional mobility.-not met       Outcome: Ongoing (interventions implemented as appropriate)  Goals remain appropriate; continue current POC.     Opal Manzano PT, DPT   7/23/2019  Pager: [phone]

## 2019-07-23 NOTE — PT/OT/SLP PROGRESS
"Occupational Therapy   Treatment    Name: Justyn Rawls  MRN: [id number]  Admitting Diagnosis:  Stroke due to embolism of left middle cerebral artery  Day of Surgery    Recommendations:     Discharge Recommendations: rehabilitation facility  Discharge Equipment Recommendations:  tub bench, wheelchair, manual  Barriers to discharge:  Inaccessible home environment, Decreased caregiver support    Assessment:     Justyn Rawls is a 49 y.o. male with a medical diagnosis of Stroke due to embolism of left middle cerebral artery.  He presents with performance deficits affecting function are weakness, impaired self care skills, impaired balance, decreased coordination, decreased safety awareness, impaired endurance, impaired functional mobilty, decreased upper extremity function, decreased lower extremity function, impaired cognition, gait instability, impaired sensation, impaired fine motor, abnormal tone, impaired coordination, decreased ROM. Increased flexor tone noted right UE.    Rehab Prognosis:  Good; patient would benefit from acute skilled OT services to address these deficits and reach maximum level of function.       Plan:     Patient to be seen 4 x/week to address the above listed problems via self-care/home management, neuromuscular re-education, cognitive retraining, therapeutic activities, therapeutic exercises, sensory integration  · Plan of Care Expires: 08/13/19  · Plan of Care Reviewed with: patient, spouse    Subjective   Patient: "How are you?"  Pain/Comfort:  · Pain Rating 1: 0/10  · Pain Rating Post-Intervention 1: 0/10    Objective:     Communicated with: Nurse prior to session.  Patient found supine with bed alarm, blood pressure cuff, peripheral IV, pulse ox (continuous), SCD, telemetry upon OT entry to room.  Family not present.    General Precautions: Standard, aspiration, aphasia, fall   Orthopedic Precautions:N/A   Braces: N/A     Occupational Performance:     Bed Mobility:    · Patient " completed Rolling/Turning to Left with  moderate assistance  · Patient completed Supine to Sit with minimum assistance  · Patient completed Sit to Supine with minimum assistance     Functional Mobility/Transfers:  · Min assist with scooting along the EOB    Activities of Daily Living:  · Grooming: moderate assistance while seated EOB with right UE in weight bearing    [facility] 6 Click ADL: 12    Treatment & Education:  Patient education provided on role of OT and need for rehab upon discharge.  Patient education provided on hemiplegic dressing technique, right UE weight bearing / positioning, postural control, and transfers.  Continued education, patient/ family training recommended. Patient alert; able to follow 1/4 one step commands.  Unable to identify body parts, 0/3.  Patient oriented x person only.  Daily orientation provided. PROM performed right UE one set x 10 rep in all planes of motion with stretches provided at end range; sustained stretch provided for external rotation.  Assistance and facilitation provided for upward rotation of the scapula during shoulder flexion and abduction.   Positioning provided for midline orientation with bilateral UEs elevated and heels lifted off mattress.  Addressed right UE weight bearing while seated EOB.   White board updated in patient's room.      Patient left supine with all lines intact, call button in reach and bed alarm onEducation:      GOALS:   Multidisciplinary Problems     Occupational Therapy Goals        Problem: Occupational Therapy Goal    Goal Priority Disciplines Outcome Interventions   Occupational Therapy Goal     OT, PT/OT     Description:  Goals set 7/15 to be met by 7/29   Patient will increase functional independence with ADLs by performing:    UE Dressing with Minimal Assistance with hemiplegic technique.  LE Dressing with Minimal Assistance.  Grooming while standing with Contact Guard Assistance.   Toileting from bedside commode with Minimal  Assistance for hygiene and clothing management.   Rolling to Left with Contact Guard Assistance.   Rolling to Right with Supervision.   Stand pivot transfers with Contact Guard Assistance.  Patient/Caregivers will be independent in assisting in bed mobility/positioning  Patient/Caregivers will be independent in HEP for weightbearing with RUE, PROM of RUE.                        Time Tracking:     OT Date of Treatment: 07/23/19  OT Start Time: 0950  OT Stop Time: 1008  OT Total Time (min): 18 min    Billable Minutes:Self Care/Home Management 18    JONNY Rosario  7/23/2019

## 2019-07-23 NOTE — PLAN OF CARE
07/22/19 2055   Discharge Reassessment   Assessment Type Discharge Planning Reassessment   Provided patient/caregiver education on the expected discharge date and the discharge plan Yes   Do you have any problems affording any of your prescribed medications? No   Discharge Plan A Rehab   Discharge Plan B Home Health;Home with family

## 2019-07-23 NOTE — ASSESSMENT & PLAN NOTE
- Neurological exam is unchanged  - CTH this am with no significant interval changes  -neuro checks q1hr   -vital checks q 1hr   - on 81 mg ASA   - PLTs 104 7/22  -ABDIAS completed 7/23 - no valvular thrombus, no thrombus visualized

## 2019-07-23 NOTE — SUBJECTIVE & OBJECTIVE
Review of Systems   Cardiovascular: Negative for chest pain, dyspnea on exertion and leg swelling.   Respiratory: Negative for cough and shortness of breath.    Hematologic/Lymphatic: Does not bruise/bleed easily.   Musculoskeletal: Positive for muscle weakness.        Right sided weakness   Gastrointestinal: Negative for abdominal pain, nausea and vomiting.     Objective:     Vital Signs (Most Recent):  Temp: 98.2 °F (36.8 °C) (07/23/19 1105)  Pulse: (!) 50 (07/23/19 1405)  Resp: 12 (07/23/19 1405)  BP: 126/79 (07/23/19 1405)  SpO2: 97 % (07/23/19 1405) Vital Signs (24h Range):  Temp:  [97.7 °F (36.5 °C)-99.3 °F (37.4 °C)] 98.2 °F (36.8 °C)  Pulse:  [44-80] 50  Resp:  [11-27] 12  SpO2:  [92 %-100 %] 97 %  BP: (108-172)/(59-96) 126/79     Weight: 89.4 kg (197 lb 1.5 oz)  Body mass index is 24 kg/m².     SpO2: 97 %  O2 Device (Oxygen Therapy): room air      Intake/Output Summary (Last 24 hours) at 7/23/2019 1501  Last data filed at 7/23/2019 1405  Gross per 24 hour   Intake 345 ml   Output 1600 ml   Net -1255 ml       Lines/Drains/Airways     Peripheral Intravenous Line                 Peripheral IV - Single Lumen 07/22/19 1249 22 G Right Hand 1 day         Peripheral IV - Single Lumen 07/22/19 1551 20 G Left Hand less than 1 day         Peripheral IV - Single Lumen 07/23/19 0918 18 G Left;Posterior Forearm less than 1 day                Physical Exam   Constitutional: He appears well-developed and well-nourished.   HENT:   Head: Normocephalic and atraumatic.   Right sided facial drooping   Eyes:   EOM abnormal- unable to look in different directions without turning head   Neck: Normal range of motion. Neck supple. No JVD present.   Cardiovascular: Normal rate and normal heart sounds. Exam reveals no gallop and no friction rub.   No murmur heard.  No carotid bruits   Pulmonary/Chest: Effort normal and breath sounds normal. No respiratory distress. He exhibits no tenderness.   Abdominal: Soft. Bowel sounds are  Chart check    Hospitalist consult received for admit H&P.  Patient has been seen by Medicine for a full consult on 9/17 at [facility].  Please refer to the detailed note from Dr. Cee dated 9/17 for further information. She has been transferred here for ECT due to previously having a severe asthma attack following her first treatment.  - Pulmonary consult ordered  - Labs, vitals, and EKG reviewed.  Cleared for ECT pending pulmonary input  - Will defer repeat hospitalist consult at this time.    Please call with any questions or concerns.    Richie Wade PA-C   normal. He exhibits no distension. There is no tenderness.   Diffuse abdominal tenderness to palpation   Musculoskeletal: He exhibits no edema, tenderness or deformity.   Lymphadenopathy:     He has no cervical adenopathy.   Neurological: A cranial nerve deficit is present. Coordination abnormal.   Pt has decreased sensation and no movement in RUE.  Pt has intact sensation but decreased motor control in RLE  Can move LUE and has improved poor motor control   Can move LLE but cannot follow commands like wiggling toes  Pt has expressive aphasia   Skin: Skin is warm and dry. No rash noted. No erythema.       Review of Systems   Respiratory: Negative for cough and shortness of breath.    Cardiovascular: Negative for chest pain, dyspnea on exertion and leg swelling.   Gastrointestinal: Negative for abdominal pain, nausea and vomiting.   Musculoskeletal: Positive for muscle weakness.        Right sided weakness   Hematological: Does not bruise/bleed easily.       Physical Exam   Constitutional: He appears well-developed and well-nourished.   HENT:   Head: Normocephalic and atraumatic.   Right sided facial drooping   Eyes:   EOM abnormal- unable to look in different directions without turning head   Neck: Normal range of motion. Neck supple. No JVD present.   Cardiovascular: Normal rate and normal heart sounds. Exam reveals no gallop and no friction rub.   No murmur heard.  No carotid bruits   Pulmonary/Chest: Effort normal and breath sounds normal. No respiratory distress. He exhibits no tenderness.   Abdominal: Soft. Bowel sounds are normal. He exhibits no distension. There is no tenderness.   Diffuse abdominal tenderness to palpation   Musculoskeletal: He exhibits no edema, tenderness or deformity.   Lymphadenopathy:     He has no cervical adenopathy.   Neurological: A cranial nerve deficit is present. Coordination abnormal.   Pt has decreased sensation and no movement in RUE.  Pt has intact sensation but decreased  motor control in RLE  Can move LUE and has improved poor motor control   Can move LLE but cannot follow commands like wiggling toes  Pt has expressive aphasia   Skin: Skin is warm and dry. No rash noted. No erythema.

## 2019-07-23 NOTE — ASSESSMENT & PLAN NOTE
- rate control  - started ASA 81 mg   - ABDIAS completed     Left atrial appendage with a well seated 21mm Watchman device.    No peridevice leak on color doppler.    No thrombus visualizedNo intra-atrial shunt on bubble study with positive valsalva    No Valvular thrombus, mass or vegation visualized

## 2019-07-23 NOTE — SUBJECTIVE & OBJECTIVE
Past Medical History:   Diagnosis Date    CVA (cerebral infarction) 4/22/2016    Paroxysmal atrial fibrillation 4/22/2016    Stroke        Past Surgical History:   Procedure Laterality Date    watchman device         Review of patient's allergies indicates:  No Known Allergies    No current facility-administered medications on file prior to encounter.      No current outpatient medications on file prior to encounter.     Family History     Problem Relation (Age of Onset)    Cancer Father    Early death Father    Hyperlipidemia Mother    Hypertension Mother        Tobacco Use    Smoking status: Never Smoker    Smokeless tobacco: Never Used   Substance and Sexual Activity    Alcohol use: Yes     Comment: 2 times per month    Drug use: No    Sexual activity: Yes     Review of Systems   Reason unable to perform ROS: Pt unable to answer questions because of expressive aphasia. Pt did deny any chest pain, abdominal pain or difficulty breathing.      Objective:     Vital Signs (Most Recent):  Temp: 98.7 °F (37.1 °C) (07/23/19 0305)  Pulse: (!) 57 (07/23/19 0605)  Resp: 11 (07/23/19 0605)  BP: (!) 154/88 (07/23/19 0605)  SpO2: 97 % (07/23/19 0605) Vital Signs (24h Range):  Temp:  [97.7 °F (36.5 °C)-99.3 °F (37.4 °C)] 98.7 °F (37.1 °C)  Pulse:  [46-82] 57  Resp:  [11-29] 11  SpO2:  [92 %-99 %] 97 %  BP: (117-174)/(59-94) 154/88     Weight: 89.4 kg (197 lb)  Body mass index is 23.98 kg/m².    SpO2: 97 %  O2 Device (Oxygen Therapy): room air      Intake/Output Summary (Last 24 hours) at 7/23/2019 0657  Last data filed at 7/23/2019 0605  Gross per 24 hour   Intake 70 ml   Output 1600 ml   Net -1530 ml       Lines/Drains/Airways     Peripheral Intravenous Line                 Peripheral IV - Single Lumen 07/22/19 1249 22 G Right Hand less than 1 day         Peripheral IV - Single Lumen 07/22/19 1551 20 G Left Hand less than 1 day                Physical Exam   Constitutional: He appears well-developed and  well-nourished.   HENT:   Head: Normocephalic and atraumatic.   Right sided facial drooping   Eyes:   EOM abnormal- unable to look in different directions without turning head   Neck: Normal range of motion. Neck supple. No JVD present.   Cardiovascular: Normal rate and normal heart sounds. Exam reveals no gallop and no friction rub.   No murmur heard.  No carotid bruits   Pulmonary/Chest: Effort normal and breath sounds normal. No respiratory distress. He exhibits no tenderness.   Decreased breath sounds in LL b/l   Abdominal: Soft. Bowel sounds are normal. He exhibits no distension. There is tenderness.   Diffuse abdominal tenderness to palpation   Musculoskeletal: He exhibits edema. He exhibits no tenderness or deformity.   2+ nonpitting edema in R hand and R ankle   Lymphadenopathy:     He has no cervical adenopathy.   Neurological: A cranial nerve deficit is present. Coordination abnormal.   Pt has no sensation or movement in RUE or RLE.  Can move LUE but has poor motor control   Can move LLE but cannot follow commands like wiggling toes  Pt has expressive aphasia   Skin: Skin is warm and dry. No rash noted. No erythema.       Significant Labs:   CMP   Recent Labs   Lab 07/22/19  0135      K 3.6      CO2 24      BUN 9   CREATININE 0.8   CALCIUM 8.1*   PROT 5.3*   ALBUMIN 2.3*   BILITOT 0.3   ALKPHOS 55   AST 44*   ALT 39   ANIONGAP 8   ESTGFRAFRICA >60.0   EGFRNONAA >60.0   , CBC   Recent Labs   Lab 07/22/19  0135 07/23/19  0609   WBC 6.70 7.76   HGB 11.4* 11.7*   HCT 34.9* 36.1*   *  --    , INR No results for input(s): INR, PROTIME in the last 48 hours. and Lipid Panel No results for input(s): CHOL, HDL, LDLCALC, TRIG, CHOLHDL in the last 48 hours.    Significant Imaging: Echocardiogram:   Transthoracic echo (TTE) complete (Cupid Only):   Results for orders placed or performed during the hospital encounter of 07/14/19   Transthoracic echo (TTE) 2D with Color Flow   Result Value Ref  Range    Ascending aorta 3.83 cm    STJ 3.25 cm    AV mean gradient 5 mmHg    Ao peak brant 1.38 m/s    Ao VTI 27.00 cm    IVRT 0.09 msec    IVS 0.65 0.6 - 1.1 cm    LA size 3.19 cm    Left Atrium Major Axis 5.05 cm    Left Atrium Minor Axis 5.15 cm    LVIDD 5.00 3.5 - 6.0 cm    LVIDS 3.19 2.1 - 4.0 cm    LVOT diameter 2.46 cm    LVOT peak VTI 22.83 cm    PW 0.68 0.6 - 1.1 cm    MV Peak A Brant 0.46 m/s    E wave decelartion time 155.75 msec    MV Peak E Brant 1.02 m/s    PV Peak D Brant 0.38 m/s    PV Peak S Brant 0.47 m/s    RA Major Axis 5.00 cm    RA Width 3.67 cm    RVDD 3.81 cm    Sinus 3.80 cm    TAPSE 2.58 cm    TR Max Brant 2.51 m/s    TDI LATERAL 0.13 m/s    TDI SEPTAL 0.13 m/s    LA WIDTH 4.09 cm    LV Diastolic Volume 118.07 mL    LV Systolic Volume 40.65 mL    RV S' 15.92 cm/s    LVOT peak brant 1.18 m/s    LV LATERAL E/E' RATIO 7.85 m/s    LV SEPTAL E/E' RATIO 7.85 m/s    FS 36 %    LA volume 56.55 cm3    LV mass 107.63 g    Left Ventricle Relative Wall Thickness 0.27 cm    AV valve area 4.02 cm2    AV Velocity Ratio 0.86     AV index (prosthetic) 0.85     E/A ratio 2.22     Mean e' 0.13 m/s    Pulm vein S/D ratio 1.24     LVOT area 4.8 cm2    LVOT stroke volume 108.45 cm3    AV peak gradient 8 mmHg    E/E' ratio 7.85 m/s    LV Systolic Volume Index 18.5 mL/m2    LV Diastolic Volume Index 53.63 mL/m2    LA Volume Index 25.7 mL/m2    LV Mass Index 49 g/m2    Triscuspid Valve Regurgitation Peak Gradient 25 mmHg    BSA 2.19 m2    Right Atrial Pressure (from IVC) 8 mmHg    TV rest pulmonary artery pressure 33 mmHg    Narrative    · Normal left ventricular systolic function. The estimated ejection   fraction is 60%  · Normal LV diastolic function.  · Normal right ventricular systolic function.  · Mild tricuspid regurgitation.  · Intermediate central venous pressure (8 mm Hg).  · The estimated PA systolic pressure is 33 mm Hg

## 2019-07-23 NOTE — PT/OT/SLP PROGRESS
Physical Therapy Treatment    Patient Name:  Justyn Rawls   MRN:  [id number]    Recommendations:     Discharge Recommendations:  rehabilitation facility   Discharge Equipment Recommendations: (TBD)   Barriers to discharge: increased level of skilled assistance required    Assessment:     Justyn Rawls is a 49 y.o. male admitted with a medical diagnosis of stroke due to embolism of left middle cerebral artery. Pt cooperative during session, but continues to display intermittent impulsivity. Mr. Rawls tolerated stand pivot transfer from bed>chair with max A.   He is not yet at [facility] and presents with the following impairments/functional limitations:  weakness, impaired endurance, impaired self care skills, impaired functional mobilty, gait instability, impaired balance, visual deficits, impaired cognition, decreased lower extremity function, decreased upper extremity function, decreased safety awareness, impaired cardiopulmonary response to activity. Pt remains an excellent candidate for inpatient rehabilitation, as he has a qualifying diagnosis, displays increasing activity tolerance, is motivated to participate, and has a strong support system willing to assist the pt upon discharge.     Rehab Prognosis: Good; patient would benefit from acute skilled PT services to address these deficits and reach maximum level of function.      Recent Surgery: Procedure(s) (LRB):  ECHOCARDIOGRAM, TRANSESOPHAGEAL (N/A) Day of Surgery    Plan:     During this hospitalization, patient to be seen 4 x/week to address the identified rehab impairments via gait training, therapeutic activities, therapeutic exercises, neuromuscular re-education and progress toward the following goals:    · Plan of Care Expires:  08/13/19    Subjective     Patient/Family Comments/goals: Pt unable to clearly state goals 2/2 aphasia; pt agreeable to participate in therapy   Pain/Comfort:  · Pain Rating 1: 0/10  · Pain Rating Post-Intervention 1:  0/10      Objective:     Communicated with RN prior to session.  Patient found supine with blood pressure cuff, peripheral IV, pulse ox (continuous), SCD, telemetry upon PT entry to room.     General Precautions: Standard, fall, aspiration, aphasia   Orthopedic Precautions:N/A   Braces: N/A     Functional Mobility:    Bed Mobility:   · Rolling: to right with contact guard assistance, to left with minimum assistance   · Supine > Sit: minimum assistance   · Sit > Supine: N/T     Transfers:   · Sit <> Stand Transfer: moderate assistance from EOB x 2 trials with no AD   · Bed > Chair Stand Pivot Transfer: maximum assistance with no AD     Gait: Pt performed side stepping and pivot of LLE during bed>chair transfer, but required max A to advance and reposition RLE.     Therapeutic Activities and Exercises:   Therapeutic activities aimed to:  -  increase pt's independence, safety, and efficiency with bed mobility and functional transfers. See above for assistance levels.   -  Educate pt and family on: PT POC, safety with mobility, fall risk, positioning of RUE and RLE, AAROM to R    Pt performed above listed activities with increased verbal and tactile cueing for safety and sequencing of motor tasks. Pt with impulsivity at times, attempting to transition from sup>sit prior to therapist instruction. However, with cueing- he is able to follow simple commands and remain safe with therapist instruction. Performed stand pivot transfer from EOB>chair with max A. Pt able to feed himself in upright sitting with LUE- pt's mother present for feeding assistance as needed and supervision. RN notified.     Patient left up in chair with all lines intact, call button in reach, RN notified and mother present.    AM-PAC 6 CLICK MOBILITY  Turning over in bed (including adjusting bedclothes, sheets and blankets)?: 3  Sitting down on and standing up from a chair with arms (e.g., wheelchair, bedside commode, etc.): 2  Moving from lying on back  to sitting on the side of the bed?: 3  Moving to and from a bed to a chair (including a wheelchair)?: 2  Need to walk in hospital room?: 2  Climbing 3-5 steps with a railing?: 1  Basic Mobility Total Score: 13       GOALS:   Multidisciplinary Problems     Physical Therapy Goals        Problem: Physical Therapy Goal    Goal Priority Disciplines Outcome Goal Variances Interventions   Physical Therapy Goal     PT, PT/OT Ongoing (interventions implemented as appropriate)     Description:  PT goals until 8/2/19    1. Pt supine to sit with minimal assist- MET 7/19  2. Pt sit to supine with minimal assist-not met  3. Pt sit to stand with LRAD with minimal assist-not met  4. Pt to perform gait 20ft with LRAD with max assist.-not met  5. Pt to go up/down curb step with LRAD with max assist.-not met  6. Pt to transfer bed to/from bedside chair with moderate assist.-not met  7. Pt to perform B LE exs in sitting or supine x 20 reps to strengthen B LE to improve functional mobility.-not met                         Time Tracking:     PT Received On: 07/23/19  PT Start Time: 1306     PT Stop Time: 1330  PT Total Time (min): 24 min     Billable Minutes: Therapeutic Activity 24 min    Treatment Type: Treatment  PT/PTA: PT     PTA Visit Number: 0     Opal Manzano PT, DPT   7/23/2019  Pager: [phone]

## 2019-07-23 NOTE — PROGRESS NOTES
Occupational Therapy   Not Seen     Justyn S Sinai   MRN: [id number]     Patient not seen 7/23 2* unavailable; away at [facility].  Will follow up 7/24.  JONNY Rosario  7/23/2019

## 2019-07-23 NOTE — ASSESSMENT & PLAN NOTE
Pt is 48yo M w/ PMH afib s/p watchman in 2017 who presented w/ stroke d/t L MCA embolism s/p tPA.     Plan:  - Carotid U/S to evaluate for possible source of emboli showed no stenosis or occlusion  - ABDIAS  Showed well-seated watchman w/ no sharonda-device leak and no visible thrombus. Bubble study was negative  - In setting of embolic stroke w/ no clear source, would recommend long-term warfarin w/ therapy goal 2-2.5  - Continue high intensity statin  - f/u w/ cardiology outpatient

## 2019-07-23 NOTE — TRANSFER OF CARE
"Anesthesia Transfer of Care Note    Patient: Justyn Rawls    Procedure(s) Performed: Procedure(s) (LRB):  ECHOCARDIOGRAM, TRANSESOPHAGEAL (N/A)    Patient location: ICU    Anesthesia Type: general    Transport from OR: Transported from OR on room air with adequate spontaneous ventilation    Post pain: adequate analgesia    Post assessment: no apparent anesthetic complications and tolerated procedure well    Post vital signs: stable    Level of consciousness: awake    Nausea/Vomiting: no nausea/vomiting    Complications: none    Transfer of care protocol was followed      Last vitals:   Visit Vitals  /64 (BP Location: Right arm, Patient Position: Lying)   Pulse 64   Temp 37.2 °C (98.9 °F) (Oral)   Resp 16   Ht 6' 3.98" (1.93 m)   Wt 89.4 kg (197 lb 1.5 oz)   SpO2 96%   BMI 24.00 kg/m²     "

## 2019-07-23 NOTE — PROGRESS NOTES
[facility]  Cardiology  Progress Note    Patient Name: Justyn Rawls  MRN: [id number]  Admission Date: 7/14/2019  Hospital Length of Stay: 9 days  Code Status: Full Code   Attending Physician: Ernesto Duran MD   Primary Care Physician: No primary care provider on file.  Expected Discharge Date: 7/29/2019  Principal Problem:Stroke due to embolism of left middle cerebral artery    Subjective:     Hospital Course:   Pt admitted for L MCA stroke. Plts dropped <100 on 7/19 so ABDIAS was delayed. Plts now 104 and pt is stable.      Interval History: Plts are >100 and stable. ABDIAS scheduled for today    Review of Systems   Cardiovascular: Negative for chest pain, dyspnea on exertion and leg swelling.   Respiratory: Negative for cough and shortness of breath.    Hematologic/Lymphatic: Does not bruise/bleed easily.   Musculoskeletal: Positive for muscle weakness.        Right sided weakness   Gastrointestinal: Negative for abdominal pain, nausea and vomiting.     Objective:     Vital Signs (Most Recent):  Temp: 98.9 °F (37.2 °C) (07/23/19 0705)  Pulse: 61 (07/23/19 0805)  Resp: (!) 23 (07/23/19 0805)  BP: (!) 153/87 (07/23/19 0805)  SpO2: 98 % (07/23/19 0805) Vital Signs (24h Range):  Temp:  [97.7 °F (36.5 °C)-99.3 °F (37.4 °C)] 98.9 °F (37.2 °C)  Pulse:  [44-82] 61  Resp:  [11-27] 23  SpO2:  [92 %-99 %] 98 %  BP: (117-174)/(59-94) 153/87     Weight: 89.4 kg (197 lb)  Body mass index is 23.98 kg/m².     SpO2: 98 %  O2 Device (Oxygen Therapy): room air      Intake/Output Summary (Last 24 hours) at 7/23/2019 0857  Last data filed at 7/23/2019 0805  Gross per 24 hour   Intake 70 ml   Output 1900 ml   Net -1830 ml       Lines/Drains/Airways     Peripheral Intravenous Line                 Peripheral IV - Single Lumen 07/22/19 1249 22 G Right Hand less than 1 day         Peripheral IV - Single Lumen 07/22/19 1551 20 G Left Hand less than 1 day                Physical Exam   Constitutional: He appears  well-developed and well-nourished.   HENT:   Head: Normocephalic and atraumatic.   Right sided facial drooping   Eyes:   EOM abnormal- unable to look in different directions without turning head   Neck: Normal range of motion. Neck supple. No JVD present.   Cardiovascular: Normal rate and normal heart sounds. Exam reveals no gallop and no friction rub.   No murmur heard.  No carotid bruits   Pulmonary/Chest: Effort normal and breath sounds normal. No respiratory distress. He exhibits no tenderness.   Abdominal: Soft. Bowel sounds are normal. He exhibits no distension. There is no tenderness.   Diffuse abdominal tenderness to palpation   Musculoskeletal: He exhibits no edema, tenderness or deformity.   Lymphadenopathy:     He has no cervical adenopathy.   Neurological: A cranial nerve deficit is present. Coordination abnormal.   Pt has decreased sensation and no movement in RUE.  Pt has intact sensation but decreased motor control in RLE  Can move LUE and has improved poor motor control   Can move LLE but cannot follow commands like wiggling toes  Pt has expressive aphasia   Skin: Skin is warm and dry. No rash noted. No erythema.         Assessment and Plan:         * Stroke due to embolism of left middle cerebral artery  Pt is 48yo M w/ PMH afib s/p watchman in 2017 who presented w/ stroke d/t L MCA embolism s/p tPA.     Plan:  - Carotid U/S to evaluate for possible source of emboli showed no stenosis or occlusion  - [facility]  Showed well-seated watchman w/ no sharonda-device leak and no visible thrombus. Bubble study was negative  - In setting of embolic stroke w/ no clear source, would recommend long-term warfarin w/ therapy goal 2-2.5  - Continue high intensity statin  - f/u w/ cardiology outpatient      Hypertension  Pt is 49yoM w/ HTN and A fib s/p watchman who presented w/ L MCA stroke. Pt's BP 140s-160s/50s-80s.    Plan:  -Continue to uptitrate lisinopril to BP goals per neuro    VTE Risk Mitigation (From admission,  onward)        Ordered     IP VTE LOW RISK PATIENT  Once      07/14/19 0227     Place sequential compression device  Until discontinued      07/14/19 0227          Gris Sandoval MD  Cardiology  [facility]

## 2019-07-23 NOTE — PROGRESS NOTES
[facility]  Vascular Neurology  Comprehensive Stroke Center  Progress Note    Assessment/Plan:     * Stroke due to embolism of left middle cerebral artery  48 y/o male with L MCA stroke due to M1 occlusion received IV TPA and thrombectomy to TICI 2C, probable due to afib    Antithrombotics: Need anticoagulation after TPA window    Statins: Lipitor 40 mg daily    Aggressive risk factor modification: A-Fib, alcohol     Rehab efforts: The patient has been evaluated by a stroke team provider and the therapy needs have been fully considered based off the presenting complaints and exam findings. The following therapy evaluations are needed: PT evaluate and treat, OT evaluate and treat, SLP evaluate and treat, PM&R evaluate for appropriate placement    Diagnostics ordered/pending: HgbA1C to assess blood glucose levels, Lipid Profile to assess cholesterol levels, MRI head without contrast to assess brain parenchyma, TTE to assess cardiac function/status     VTE prophylaxis: SCD's raquel began Heparin 5000 units sc Q8H on 7-15-10 at 0600 but held (07/19/2019) due to thrombocytopenia     BP parameters: Infarct: Post sucessful thrombectomy, SBP <140        Thrombocytopenia  Patient presents with normal PLTs count and dropped while hospitalization could be heparin related  Consult hematology for thrombocytopenia  Consider abdominal US     Aphasia  Due to stroke  Aggressive therapy    Hemiparesis, right  Due to stroke  Aggressive therapy    S/P admn tPA in diff fac w/n last 24 hr bef adm to crnt fac  Close monitoring in NCC 24 hours post administration         PAF (paroxysmal atrial fibrillation)  Patient has watch vargas device when he was in . His wife states that he received anticoagulation before the device placement with no reported side effects    Afib is a Stroke risk factor  · Rate control  CHADVASC score: 2  HAS BLED score: 2    TTE (07/14/2019):   Normal left ventricular systolic function. The  estimated ejection fraction is 60%  · Normal LV diastolic function.  · Normal right ventricular systolic function.  · Mild tricuspid regurgitation.  · Intermediate central venous pressure (8 mm Hg).  · The estimated PA systolic pressure is 33 mm Hg    ABDIAS (7/23/2019):    Left atrial appendage with a well seated 21mm Watchman device. No peridevice leak on color doppler. No thrombus visualized.  · No intra-atrial shunt on bubble study with positive valsalva  · No Valvular thrombus, mass or vegation visualized  · Normal left ventricular systolic function. The estimated ejection fraction is 55%  · Normal right ventricular systolic function.  · Moderate size Left pleural effusion    Needs anticoagulation - Hold anticoagulation in the setting of thrombocytopenia.  Start ASA monotherapy. Will watch the PLTs for the next few days if stable or improving will consider DAPT vs NOAC.  Consult hematology for thrombocytopenia  Consider abdominal US          7/14 admit to [facility]CC L MCA embolic stroke, s/p tpa, s/p thrombectomy L M1 multiple clots removed. ETOH use, monitor for withdrawals.  7/15 scheduled MRI wo contrast for tomorrow morning with planning for chemical DVT ppx. Continued 2% hypertonic saline and mannitol.   7/16 MRI wo contrast currently postponed, awaiting clarification that atrial device is safe to scan.  No acute events today. Continued 2% saline and mannitol.   7/17 MRI wo contrast preformed today, awaiting results. Plans to start anticoagulation following MRI per stroke team.   7/18 Cardiology consulted today for recs on whether to start AC or preform a ABDIAS to check potential thrombi on watchman.   7/19 Platelet count decreased below 100 while Heparin. D/c heparin and hold on any AC.  Gave lasix IV 20 to help with fluid overload. D/c 2%HTS  7/20 Platelet count still below 100.  HIT antibody panel pending.  Hold on ABDIAS until thrombocytopenia resolved.   7/21: Developing HA. Repeated CTH with no significant interval  changes   7/22: ABDIAS procedure tomorrow 7/23, NPO at midnight      STROKE DOCUMENTATION   Acute Stroke Times   Last Known Normal Date: 07/13/19  Last Known Normal Time: 2130  Symptom Onset Date: 07/13/19  Symptom Onset Time: 2130  Stroke Team Called Date: 07/13/19  Stroke Team Called Time: 2245  Stroke Team Arrival Date: 07/14/19  Stroke Team Arrival Time: 0107  CT Interpretation Time: 2251  Decision to Treat Time for Alteplase: 2307(bolus given)  Decision to Treat Time for IR: 0107    NIH Scale:  1a. Level of Consciousness: 0-->Alert, keenly responsive  1b. LOC Questions: 0-->Answers both questions correctly  1c. LOC Commands: 0-->Performs both tasks correctly  2. Best Gaze: 0-->Normal  3. Visual: 1-->Partial hemianopia  4. Facial Palsy: 2-->Partial paralysis (total or near-total paralysis of lower face)  5a. Motor Arm, Left: 0-->No drift, limb holds 90 (or 45) degrees for full 10 secs  5b. Motor Arm, Right: 3-->No effort against gravity, limb falls  6a. Motor Leg, Left: 0-->No drift, leg holds 30 degree position for full 5 secs  6b. Motor Leg, Right: 2-->Some effort against gravity, leg falls to bed by 5 secs, but has some effort against gravity  7. Limb Ataxia: 0-->Absent  8. Sensory: 0-->Normal, no sensory loss  9. Best Language: 1-->Mild-to-moderate aphasia, some obvious loss of fluency or facility of comprehension, without significant limitation on ideas expressed or form of expression. Reduction of speech and/or comprehension, however, makes conversation. . . (see row details)  10. Dysarthria: 1-->Mild-to-moderate dysarthria, patient slurs at least some words and, at worst, can be understood with some difficulty  11. Extinction and Inattention (formerly Neglect): 0-->No abnormality  Total (NIH Stroke Scale): 10       Modified [place] Score: 0  Gary Coma Scale:15   ABCD2 Score:    [id number] Score:2  HAS -BLED Score:2  ICH Score:   Hunt & Ta Classification:              Review of Systems   Cardiovascular:  Negative for chest pain, dyspnea on exertion and leg swelling.   Respiratory: Negative for cough and shortness of breath.    Hematologic/Lymphatic: Does not bruise/bleed easily.   Musculoskeletal: Positive for muscle weakness.        Right sided weakness   Gastrointestinal: Negative for abdominal pain, nausea and vomiting.     Objective:     Vital Signs (Most Recent):  Temp: 98.2 °F (36.8 °C) (07/23/19 1105)  Pulse: (!) 50 (07/23/19 1405)  Resp: 12 (07/23/19 1405)  BP: 126/79 (07/23/19 1405)  SpO2: 97 % (07/23/19 1405) Vital Signs (24h Range):  Temp:  [97.7 °F (36.5 °C)-99.3 °F (37.4 °C)] 98.2 °F (36.8 °C)  Pulse:  [44-80] 50  Resp:  [11-27] 12  SpO2:  [92 %-100 %] 97 %  BP: (108-172)/(59-96) 126/79     Weight: 89.4 kg (197 lb 1.5 oz)  Body mass index is 24 kg/m².     SpO2: 97 %  O2 Device (Oxygen Therapy): room air      Intake/Output Summary (Last 24 hours) at 7/23/2019 1501  Last data filed at 7/23/2019 1405  Gross per 24 hour   Intake 345 ml   Output 1600 ml   Net -1255 ml       Lines/Drains/Airways     Peripheral Intravenous Line                 Peripheral IV - Single Lumen 07/22/19 1249 22 G Right Hand 1 day         Peripheral IV - Single Lumen 07/22/19 1551 20 G Left Hand less than 1 day         Peripheral IV - Single Lumen 07/23/19 0918 18 G Left;Posterior Forearm less than 1 day                Physical Exam   Constitutional: He appears well-developed and well-nourished.   HENT:   Head: Normocephalic and atraumatic.   Right sided facial drooping   Eyes:   EOM abnormal- unable to look in different directions without turning head   Neck: Normal range of motion. Neck supple. No JVD present.   Cardiovascular: Normal rate and normal heart sounds. Exam reveals no gallop and no friction rub.   No murmur heard.  No carotid bruits   Pulmonary/Chest: Effort normal and breath sounds normal. No respiratory distress. He exhibits no tenderness.   Abdominal: Soft. Bowel sounds are normal. He exhibits no distension. There is  no tenderness.   Diffuse abdominal tenderness to palpation   Musculoskeletal: He exhibits no edema, tenderness or deformity.   Lymphadenopathy:     He has no cervical adenopathy.   Neurological: A cranial nerve deficit is present. Coordination abnormal.   Pt has decreased sensation and no movement in RUE.  Pt has intact sensation but decreased motor control in RLE  Can move LUE and has improved poor motor control   Can move LLE but cannot follow commands like wiggling toes  Pt has expressive aphasia   Skin: Skin is warm and dry. No rash noted. No erythema.       Review of Systems   Respiratory: Negative for cough and shortness of breath.    Cardiovascular: Negative for chest pain, dyspnea on exertion and leg swelling.   Gastrointestinal: Negative for abdominal pain, nausea and vomiting.   Musculoskeletal: Positive for muscle weakness.        Right sided weakness   Hematological: Does not bruise/bleed easily.       Physical Exam   Constitutional: He appears well-developed and well-nourished.   HENT:   Head: Normocephalic and atraumatic.   Right sided facial drooping   Eyes:   EOM abnormal- unable to look in different directions without turning head   Neck: Normal range of motion. Neck supple. No JVD present.   Cardiovascular: Normal rate and normal heart sounds. Exam reveals no gallop and no friction rub.   No murmur heard.  No carotid bruits   Pulmonary/Chest: Effort normal and breath sounds normal. No respiratory distress. He exhibits no tenderness.   Abdominal: Soft. Bowel sounds are normal. He exhibits no distension. There is no tenderness.   Diffuse abdominal tenderness to palpation   Musculoskeletal: He exhibits no edema, tenderness or deformity.   Lymphadenopathy:     He has no cervical adenopathy.   Neurological: A cranial nerve deficit is present. Coordination abnormal.   Pt has decreased sensation and no movement in RUE.  Pt has intact sensation but decreased motor control in RLE  Can move LUE and has  improved poor motor control   Can move LLE but cannot follow commands like wiggling toes  Pt has expressive aphasia   Skin: Skin is warm and dry. No rash noted. No erythema.           Zainab Caceres MD  [facility]  Department of Vascular Neurology   [facility]

## 2019-07-24 PROBLEM — Z92.82 S/P ADMN TPA IN DIFF FAC W/N LAST 24 HR BEF ADM TO CRNT FAC: Status: RESOLVED | Noted: 2019-07-14 | Resolved: 2019-07-24

## 2019-07-24 PROBLEM — R94.5 LIVER FUNCTION ABNORMALITY: Status: ACTIVE | Noted: 2019-07-24

## 2019-07-24 LAB
ALBUMIN SERPL BCP-MCNC: 2.6 G/DL (ref 3.5–5.2)
ALP SERPL-CCNC: 64 U/L (ref 55–135)
ALT SERPL W/O P-5'-P-CCNC: 90 U/L (ref 10–44)
ANION GAP SERPL CALC-SCNC: 10 MMOL/L (ref 8–16)
ANISOCYTOSIS BLD QL SMEAR: SLIGHT
AST SERPL-CCNC: 83 U/L (ref 10–40)
BASOPHILS # BLD AUTO: 0.09 K/UL (ref 0–0.2)
BASOPHILS NFR BLD: 1 % (ref 0–1.9)
BILIRUB SERPL-MCNC: 0.4 MG/DL (ref 0.1–1)
BUN SERPL-MCNC: 8 MG/DL (ref 6–20)
CALCIUM SERPL-MCNC: 8.1 MG/DL (ref 8.7–10.5)
CHLORIDE SERPL-SCNC: 108 MMOL/L (ref 95–110)
CO2 SERPL-SCNC: 22 MMOL/L (ref 23–29)
CREAT SERPL-MCNC: 0.8 MG/DL (ref 0.5–1.4)
DIFFERENTIAL METHOD: ABNORMAL
EOSINOPHIL # BLD AUTO: 0.6 K/UL (ref 0–0.5)
EOSINOPHIL NFR BLD: 6.6 % (ref 0–8)
ERYTHROCYTE [DISTWIDTH] IN BLOOD BY AUTOMATED COUNT: 13.1 % (ref 11.5–14.5)
EST. GFR  (AFRICAN AMERICAN): >60 ML/MIN/1.73 M^2
EST. GFR  (NON AFRICAN AMERICAN): >60 ML/MIN/1.73 M^2
GLUCOSE SERPL-MCNC: 89 MG/DL (ref 70–110)
HCT VFR BLD AUTO: 38.9 % (ref 40–54)
HGB BLD-MCNC: 13.2 G/DL (ref 14–18)
IMM GRANULOCYTES # BLD AUTO: 0.17 K/UL (ref 0–0.04)
IMM GRANULOCYTES NFR BLD AUTO: 2 % (ref 0–0.5)
LYMPHOCYTES # BLD AUTO: 1.1 K/UL (ref 1–4.8)
LYMPHOCYTES NFR BLD: 12.8 % (ref 18–48)
MAGNESIUM SERPL-MCNC: 2 MG/DL (ref 1.6–2.6)
MCH RBC QN AUTO: 32.6 PG (ref 27–31)
MCHC RBC AUTO-ENTMCNC: 33.9 G/DL (ref 32–36)
MCV RBC AUTO: 96 FL (ref 82–98)
MONOCYTES # BLD AUTO: 0.8 K/UL (ref 0.3–1)
MONOCYTES NFR BLD: 9.6 % (ref 4–15)
NEUTROPHILS # BLD AUTO: 5.9 K/UL (ref 1.8–7.7)
NEUTROPHILS NFR BLD: 68 % (ref 38–73)
NRBC BLD-RTO: 1 /100 WBC
OVALOCYTES BLD QL SMEAR: ABNORMAL
PHOSPHATE SERPL-MCNC: 4 MG/DL (ref 2.7–4.5)
PLATELET # BLD AUTO: 145 K/UL (ref 150–350)
PLATELET BLD QL SMEAR: ABNORMAL
PMV BLD AUTO: 11 FL (ref 9.2–12.9)
POCT GLUCOSE: 122 MG/DL (ref 70–110)
POCT GLUCOSE: 98 MG/DL (ref 70–110)
POIKILOCYTOSIS BLD QL SMEAR: SLIGHT
POLYCHROMASIA BLD QL SMEAR: ABNORMAL
POTASSIUM SERPL-SCNC: 4.8 MMOL/L (ref 3.5–5.1)
PROT SERPL-MCNC: 6 G/DL (ref 6–8.4)
RBC # BLD AUTO: 4.05 M/UL (ref 4.6–6.2)
SODIUM SERPL-SCNC: 140 MMOL/L (ref 136–145)
WBC # BLD AUTO: 8.68 K/UL (ref 3.9–12.7)

## 2019-07-24 PROCEDURE — 97127 HC THERAPEUTIC INTVTN, COGN FUNCTION - OT: CPT

## 2019-07-24 PROCEDURE — 20600001 HC STEP DOWN PRIVATE ROOM

## 2019-07-24 PROCEDURE — 97535 SELF CARE MNGMENT TRAINING: CPT

## 2019-07-24 PROCEDURE — 99232 SBSQ HOSP IP/OBS MODERATE 35: CPT | Mod: ,,, | Performed by: NURSE PRACTITIONER

## 2019-07-24 PROCEDURE — 83735 ASSAY OF MAGNESIUM: CPT

## 2019-07-24 PROCEDURE — 92611 MOTION FLUOROSCOPY/SWALLOW: CPT

## 2019-07-24 PROCEDURE — 94761 N-INVAS EAR/PLS OXIMETRY MLT: CPT

## 2019-07-24 PROCEDURE — 80053 COMPREHEN METABOLIC PANEL: CPT

## 2019-07-24 PROCEDURE — 25000003 PHARM REV CODE 250: Performed by: PHYSICIAN ASSISTANT

## 2019-07-24 PROCEDURE — 25000003 PHARM REV CODE 250: Performed by: NURSE PRACTITIONER

## 2019-07-24 PROCEDURE — 97112 NEUROMUSCULAR REEDUCATION: CPT

## 2019-07-24 PROCEDURE — 84100 ASSAY OF PHOSPHORUS: CPT

## 2019-07-24 PROCEDURE — 99233 SBSQ HOSP IP/OBS HIGH 50: CPT | Mod: ,,, | Performed by: PSYCHIATRY & NEUROLOGY

## 2019-07-24 PROCEDURE — 85025 COMPLETE CBC W/AUTO DIFF WBC: CPT

## 2019-07-24 PROCEDURE — 25000003 PHARM REV CODE 250: Performed by: PSYCHIATRY & NEUROLOGY

## 2019-07-24 PROCEDURE — 99233 PR SUBSEQUENT HOSPITAL CARE,LEVL III: ICD-10-PCS | Mod: ,,, | Performed by: PSYCHIATRY & NEUROLOGY

## 2019-07-24 PROCEDURE — 99232 PR SUBSEQUENT HOSPITAL CARE,LEVL II: ICD-10-PCS | Mod: ,,, | Performed by: NURSE PRACTITIONER

## 2019-07-24 RX ADMIN — SENNOSIDES,DOCUSATE SODIUM 1 TABLET: 8.6; 5 TABLET, FILM COATED ORAL at 09:07

## 2019-07-24 RX ADMIN — LISINOPRIL 20 MG: 20 TABLET ORAL at 09:07

## 2019-07-24 RX ADMIN — ACETAMINOPHEN 650 MG: 325 TABLET ORAL at 02:07

## 2019-07-24 RX ADMIN — ASPIRIN 81 MG CHEWABLE TABLET 81 MG: 81 TABLET CHEWABLE at 09:07

## 2019-07-24 RX ADMIN — ATORVASTATIN CALCIUM 40 MG: 20 TABLET, FILM COATED ORAL at 09:07

## 2019-07-24 NOTE — ASSESSMENT & PLAN NOTE
- rate controlled  -  ASA 81 mg   -Watchman Implanted device  -EKG  - ABDIAS completed     Left atrial appendage with a well seated 21mm Watchman device.   Moderate sized L pleural Effusion              Otherwise Normal

## 2019-07-24 NOTE — NURSING TRANSFER
Nursing Transfer Note      7/24/2019     Transfer to [facility] Room 705 from [facility] Room 9077    Transfer via bed  Transfer with patient belongings   Transported by RN x 2     Medicines sent: none   Chart send with patient: Yes     Notified: HAWK Thomas    Patient reassessed at: 7/24/2019 at 1555    Upon arrival to floor: VSS. Bedside neuro assessment complete.

## 2019-07-24 NOTE — ASSESSMENT & PLAN NOTE
US abdomen (7/23): No significant abnormality of the abdominal organs. Trace right pleural effusion.    Stop statin and trend liver functions

## 2019-07-24 NOTE — SUBJECTIVE & OBJECTIVE
Interval History:  Stable overnight; prepared for NSGY stepdown.     Review of Systems   Constitutional: Positive for activity change.   HENT: Positive for voice change.    Eyes: Negative for pain and visual disturbance.   Respiratory: Negative for shortness of breath.    Cardiovascular: Negative for chest pain.   Gastrointestinal: Negative for abdominal pain, nausea and vomiting.   Genitourinary: Negative for flank pain.   Musculoskeletal: Negative for back pain.   Neurological: Positive for facial asymmetry and weakness.       Objective:     Vitals:  Temp: 98 °F (36.7 °C)  Pulse: 77  Rhythm: sinus bradycardia  BP: (!) 150/83  MAP (mmHg): 111  Resp: (!) 22  SpO2: 95 %  O2 Device (Oxygen Therapy): room air    Temp  Min: 98 °F (36.7 °C)  Max: 99.3 °F (37.4 °C)  Pulse  Min: 45  Max: 77  BP  Min: 121/66  Max: 172/94  MAP (mmHg)  Min: 85  Max: 124  Resp  Min: 11  Max: 23  SpO2  Min: 95 %  Max: 100 %    07/23 0701 - 07/24 0700  In: 425 [P.O.:225; I.V.:200]  Out: 1400 [Urine:1400]   Unmeasured Output  Urine Occurrence: 0  Stool Occurrence: 0  Emesis Occurrence: 1  Pad Count: 2       Physical Exam   Constitutional: He appears well-developed and well-nourished.   HENT:   Head: Normocephalic.   Eyes: Pupils are equal, round, and reactive to light.   Neck: Normal range of motion.   Cardiovascular: Bradycardia present.   Pulmonary/Chest: Effort normal.   Abdominal: Soft.   Neurological:   Mental Status:  Awake, follows commands  + Dysarthria   Mild R Facial Droop  Pupils 3mm, PERRL.   +Corneal reflex, +gag, +cough   RS Weakness  Decreased sensation to R       Nursing note and vitals reviewed.    Unable to test coordination, gait due to level of consciousness.    Medications:  Continuous Scheduled  aspirin 81 mg Daily   atorvastatin 40 mg Daily   lisinopril 20 mg Daily   senna-docusate 8.6-50 mg 1 tablet BID   PRN  acetaminophen 650 mg Q6H PRN   atropine 1 mg Q2H PRN   hydrALAZINE 10 mg Q4H PRN   magnesium oxide 800 mg PRN    magnesium oxide 800 mg PRN   ondansetron 8 mg Q8H PRN   potassium chloride 10% 40 mEq PRN   potassium chloride 10% 40 mEq PRN   potassium chloride 10% 40 mEq PRN   potassium, sodium phosphates 2 packet PRN   potassium, sodium phosphates 2 packet PRN   potassium, sodium phosphates 2 packet PRN   sodium chloride 0.9% 10 mL PRN   sodium chloride 0.9% 10 mL PRN     Today I personally reviewed pertinent medications, imaging, laboratory results, notably:    Diet  Diet Dysphagia Mechanical Soft (IDDSI Level 5) [facility]; [place] [facility]

## 2019-07-24 NOTE — PLAN OF CARE
Problem: Occupational Therapy Goal  Goal: Occupational Therapy Goal  Goals set 7/15 to be met by 7/29   Patient will increase functional independence with ADLs by performing:    UE Dressing with Minimal Assistance with hemiplegic technique.  LE Dressing with Minimal Assistance.  Grooming while standing with Contact Guard Assistance.   Toileting from bedside commode with Minimal Assistance for hygiene and clothing management.   Rolling to Left with Contact Guard Assistance.   Rolling to Right with Supervision.   Stand pivot transfers with Contact Guard Assistance.  Patient/Caregivers will be independent in assisting in bed mobility/positioning  Patient/Caregivers will be independent in HEP for weightbearing with RUE, PROM of RUE.       Goals remain appropriate.  JONNY Rosario  7/24/2019

## 2019-07-24 NOTE — PT/OT/SLP PROGRESS
Occupational Therapy   Treatment    Name: Justyn Rawls  MRN: [id number]  Admitting Diagnosis:  Stroke due to embolism of left middle cerebral artery  1 Day Post-Op    Recommendations:     Discharge Recommendations: rehabilitation facility  Discharge Equipment Recommendations:  none  Barriers to discharge:  Inaccessible home environment, Decreased caregiver support    Assessment:     Justyn Rawls is a 49 y.o. male with a medical diagnosis of Stroke due to embolism of left middle cerebral artery.  He presents with performance deficits affecting function are weakness, impaired self care skills, impaired balance, decreased coordination, decreased safety awareness, decreased ROM, impaired coordination, decreased upper extremity function, impaired functional mobilty, impaired endurance, impaired sensation, gait instability, impaired cognition, abnormal tone, decreased lower extremity function, impaired fine motor.     Rehab Prognosis:  Good; patient would benefit from acute skilled OT services to address these deficits and reach maximum level of function.       Plan:     Patient to be seen 4 x/week to address the above listed problems via self-care/home management, neuromuscular re-education, therapeutic activities, therapeutic exercises, sensory integration, cognitive retraining  · Plan of Care Expires: 08/13/19  · Plan of Care Reviewed with: patient    Subjective     Pain/Comfort:  · Pain Rating 1: 0/10  · Pain Rating Post-Intervention 1: 0/10    Objective:     Communicated with: Nurse prior to session.  Patient found supine with bed alarm, blood pressure cuff, peripheral IV, telemetry, pulse ox (continuous) upon OT entry to room.  Wife asleep at bedside.    General Precautions: Standard, aspiration, fall, aphasia   Orthopedic Precautions:N/A   Braces: N/A     Occupational Performance:     Bed Mobility:    · Patient completed Rolling/Turning to Left with  minimum assistance  · Patient completed Supine to Sit with  minimum assistance  · Patient completed Sit to Supine with minimum assistance     Functional Mobility/Transfers:  · Min assist with scooting along the EOB.     Activities of Daily Living:  · Grooming: minimum assistance while seated EOB with right UE in weight bearing  · Upper Body Dressing: moderate assistance while seated EOB  · Lower Body Dressing: maximal assistance while seated EOB      [facility] 6 Click ADL: 13    Treatment & Education:  Patient education provided on role of OT and need for rehab upon discharge.  Patient education provided on hemiplegic dressing technique, right UE weight bearing / positioning, postural control, and transfers.  Continued education, patient/ family training recommended. Patient alert; able to follow 2/4 one step commands. Patient oriented x person and place.  Daily orientation provided. PROM performed right UE one set x 10 rep in all planes of motion with stretches provided at end range; sustained stretch provided for external rotation.  Assistance and facilitation provided for upward rotation of the scapula during shoulder flexion and abduction. Addressed right UE weight bearing while seated EOB.   Positioning provided for midline orientation with bilateral UEs elevated and heels lifted off mattress.   White board updated in patient's room.      Patient left supine with all lines intact, call button in reach and bed alarm onEducation:      GOALS:   Multidisciplinary Problems     Occupational Therapy Goals        Problem: Occupational Therapy Goal    Goal Priority Disciplines Outcome Interventions   Occupational Therapy Goal     OT, PT/OT     Description:  Goals set 7/15 to be met by 7/29   Patient will increase functional independence with ADLs by performing:    UE Dressing with Minimal Assistance with hemiplegic technique.  LE Dressing with Minimal Assistance.  Grooming while standing with Contact Guard Assistance.   Toileting from bedside commode with Minimal Assistance for  hygiene and clothing management.   Rolling to Left with Contact Guard Assistance.   Rolling to Right with Supervision.   Stand pivot transfers with Contact Guard Assistance.  Patient/Caregivers will be independent in assisting in bed mobility/positioning  Patient/Caregivers will be independent in HEP for weightbearing with RUE, PROM of RUE.                        Time Tracking:     OT Date of Treatment: 07/24/19  OT Start Time: 0523  OT Stop Time: 0601  OT Total Time (min): 38 min    Billable Minutes:Self Care/Home Management 14  Neuromuscular Re-education 14  Cognitive Retraining 10    JONNY Rosario  7/24/2019
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
## 2019-07-24 NOTE — ASSESSMENT & PLAN NOTE
Patient presents with normal PLTs count and dropped while hospitalization could be heparin related  Consult hematology for thrombocytopenia.

## 2019-07-24 NOTE — ASSESSMENT & PLAN NOTE
Patient has watch vargas device when he was in . His wife states that he received anticoagulation before the device placement with no reported side effects    Afib is a Stroke risk factor  · Rate control  CHADVASC score: 2  HAS BLED score: 2    TTE (07/14/2019):   Normal left ventricular systolic function. The estimated ejection fraction is 60%  · Normal LV diastolic function.  · Normal right ventricular systolic function.  · Mild tricuspid regurgitation.  · Intermediate central venous pressure (8 mm Hg).  · The estimated PA systolic pressure is 33 mm Hg    ABDIAS (7/23/2019):    Left atrial appendage with a well seated 21mm Watchman device. No peridevice leak on color doppler. No thrombus visualized.  · No intra-atrial shunt on bubble study with positive valsalva  · No Valvular thrombus, mass or vegation visualized  · Normal left ventricular systolic function. The estimated ejection fraction is 55%  · Normal right ventricular systolic function.  · Moderate size Left pleural effusion    Needs anticoagulation - Hold anticoagulation in the setting of thrombocytopenia.  Start ASA monotherapy. Will watch the PLTs for the next few days if stable or improving will consider DAPT vs NOAC.  Consult hematology for thrombocytopenia

## 2019-07-24 NOTE — PROCEDURES
Modified Barium Swallow    Patient Name:  Justyn Rawls   MRN:  [id number]      Recommendations:     Recommendations:                General Recommendations:  Speech/language therapy and Cognitive-linguistic therapy  Diet recommendations:  Regular, Thin   Aspiration Precautions: 1 bite/sip at a time, Small bites/sips and Standard aspiration precautions   General Precautions: Standard, fall, aphasia  Communication strategies:  none    Referral     Reason for Referral  Patient was referred for a Modified Barium Swallow Study to assess the efficiency of his/her swallow function, rule out aspiration and make recommendations regarding safe dietary consistencies, effective compensatory strategies, and safe eating environment.     Diagnosis: Stroke due to embolism of left middle cerebral artery       History:     Past Medical History:   Diagnosis Date    CVA (cerebral infarction) 4/22/2016    Paroxysmal atrial fibrillation 4/22/2016    Stroke        Objective:     Current Respiratory Status: 07/24/19    Alert: yes  Cooperative: yes  Follows Directions: yes    Visualization  · Patient was seen in the lateral view    Oral Peripheral Examination  · Oral Musculature: gross asymmetry present, right weakness  · Dentition: present and adequate  · Oral Labial Strength and Mobility: impaired retraction, impaired pursing, impaired coordination  · Lingual Strength and Mobility: impaired strength, impaired right lateral movement, impaired anterior elevation  · Volitional Swallow: prompt ; adeqaute hyolaryngeal rise   · Voice Prior to PO Intake: strong and clear     Consistencies Assessed  · Thin via cup and straw  · Nectar thick via cup and straw  · Solids via 1/4 cracker with barium coating    Oral Preparation/Oral Phase  · WFL- Pt with adequate bolus acceptance, containment, control and timely A-P transfer across consistencies     Pharyngeal Phase   Essentially timely swallow initiation for age  Adequate hyolaryngeal elevation,  laryngeal excursion, and epiglottic inversion.   No pharyngeal wall coating  No penetration or aspiration with any consistency assessed  Mild vallecular residue noted, though cleared with double swallows.     Cervical Esophageal Phase  · UES appeared to accommodate all bolus types without stasis or retrograde movement observed     Assessment:     Impressions  ·  Patient with oral and pharyngeal phases of swallowing deemed WFL    Prognosis: Good    Barriers:  · None    Plan  Patient recommended a regular consistency diet and thin liquids.   ST to continue to follow for speech/language therapy.     Education  Results were discussed with patient. Results were discussed with Medical Team who was in agreement with plan.     Goals:   Multidisciplinary Problems     SLP Goals        Problem: SLP Goal    Goal Priority Disciplines Outcome   SLP Goal     SLP Ongoing (interventions implemented as appropriate)   Description:  Speech Language Pathology Goals  Updated Goals expected to be met by 7/29    1. Pt will participate in ongoing assessment of swallow to determine the least restrictive diet.  2. Pt will participate in ongoing assessment of speech language and cognitive linguistic skills to help rule out deficits and determine therapeutic plan of care   Updated additional goals:   1. Pt will participate in simple visual scanning tasks with 80% accuracy and mod cues.  2. Pt will complete OMEs x10 with SLP and independently   3. Pt will complete orientation tasks with 80% accuracy and mod cues.  4. Pt will complete simple naming tasks with 50% accuracy and mod cues.   5. Pt will follow simple two step commands with 50% accuracy and mod cues.  6. Pt will complete automatic speech tasks with 80% accuracy and mod cues.  7. Pt will answer Y/N questions with 60% accuracy and mod cues.   8. Pt will participate in ongoing assessment of reading and writing skills.                         Plan:   · Patient to be seen:  Therapy  Frequency: 4 x/week   · Plan of Care expires:     · Plan of Care reviewed with:  patient        Discharge recommendations:  rehabilitation facility   Barriers to Discharge:  None    Time Tracking:   SLP Treatment Date:   07/24/19  Speech Start Time:  1030  Speech Stop Time:  1050     Speech Total Time (min):  20 min    Emily Abadie, CCC-SLP  07/24/2019

## 2019-07-24 NOTE — ASSESSMENT & PLAN NOTE
Patient with confusion/disorientation on admission  Possible 2/2 ETOH history  Neuro Checks Q1 hr  Currently stable. Oriented. 7/24

## 2019-07-24 NOTE — ASSESSMENT & PLAN NOTE
- Neuro-checks q1 hr  - Permissive HTN SBP < 180  - Head of bed > 30 degrees for aspiration prevention and aspiration precautions ordered.  -CTH stable  -ABDIAS stable  -Cardiac telemetry to monitor for arrhythmia  --Aspirin   -Atorvastatin   -Stroke education and counseling  PT/OT/SLP

## 2019-07-24 NOTE — ASSESSMENT & PLAN NOTE
Lisinopril  Cardiac Monitoring  EKG stable  ABDIAS  Left atrial appendage with a well seated 21mm Watchman device.   The estimated ejection fraction is 55%  Moderate size Left pleural effusion

## 2019-07-24 NOTE — ASSESSMENT & PLAN NOTE
Heparin Induced Platelet Antibody: Results: No evidence of heparin associated thrombocytopenia.   Monitor CBC daily

## 2019-07-24 NOTE — PLAN OF CARE
Problem: SLP Goal  Goal: SLP Goal  Speech Language Pathology Goals  Updated Goals expected to be met by 7/29    1. Pt will participate in ongoing assessment of swallow to determine the least restrictive diet.  2. Pt will participate in ongoing assessment of speech language and cognitive linguistic skills to help rule out deficits and determine therapeutic plan of care   Updated additional goals:   1. Pt will participate in simple visual scanning tasks with 80% accuracy and mod cues.  2. Pt will complete OMEs x10 with SLP and independently   3. Pt will complete orientation tasks with 80% accuracy and mod cues.  4. Pt will complete simple naming tasks with 50% accuracy and mod cues.   5. Pt will follow simple two step commands with 50% accuracy and mod cues.  6. Pt will complete automatic speech tasks with 80% accuracy and mod cues.  7. Pt will answer Y/N questions with 60% accuracy and mod cues.   8. Pt will participate in ongoing assessment of reading and writing skills.        MBSS completed with implemented plan of care.   Emily P. Abadie M.S., CCC-SLP  Speech Language Pathologist  [phone]  07/24/2019

## 2019-07-24 NOTE — PROGRESS NOTES
[facility]  Vascular Neurology  Comprehensive Stroke Center  Progress Note    Assessment/Plan:     * Stroke due to embolism of left middle cerebral artery  48 y/o male with L MCA stroke due to M1 occlusion received IV TPA and thrombectomy to TICI 2C, probable due to afib    Antithrombotics: Need anticoagulation after TPA window    Statins: Lipitor 40 mg daily    Aggressive risk factor modification: A-Fib, alcohol     Rehab efforts: The patient has been evaluated by a stroke team provider and the therapy needs have been fully considered based off the presenting complaints and exam findings. The following therapy evaluations are needed: PT evaluate and treat, OT evaluate and treat, SLP evaluate and treat, PM&R evaluate for appropriate placement    Diagnostics ordered/pending: HgbA1C to assess blood glucose levels, Lipid Profile to assess cholesterol levels, MRI head without contrast to assess brain parenchyma, TTE to assess cardiac function/status     VTE prophylaxis: SCD's raquel began Heparin 5000 units sc Q8H on 7-15-10 at 0600 but held (07/19/2019) due to thrombocytopenia     BP parameters: Infarct: Post sucessful thrombectomy, SBP <140        Liver function abnormality  US abdomen (7/23): No significant abnormality of the abdominal organs. Trace right pleural effusion.    Stop statin and trend liver functions     Thrombocytopenia  Patient presents with normal PLTs count and dropped while hospitalization could be heparin related  Consult hematology for thrombocytopenia.    Aphasia  Due to stroke  Aggressive therapy    Hemiparesis, right  Due to stroke  Aggressive therapy    PAF (paroxysmal atrial fibrillation)  Patient has watch vargas device when he was in . His wife states that he received anticoagulation before the device placement with no reported side effects    Afib is a Stroke risk factor  · Rate control  CHADVASC score: 2  HAS BLED score: 2    TTE (07/14/2019):   Normal left  ventricular systolic function. The estimated ejection fraction is 60%  · Normal LV diastolic function.  · Normal right ventricular systolic function.  · Mild tricuspid regurgitation.  · Intermediate central venous pressure (8 mm Hg).  · The estimated PA systolic pressure is 33 mm Hg    ABDIAS (7/23/2019):    Left atrial appendage with a well seated 21mm Watchman device. No peridevice leak on color doppler. No thrombus visualized.  · No intra-atrial shunt on bubble study with positive valsalva  · No Valvular thrombus, mass or vegation visualized  · Normal left ventricular systolic function. The estimated ejection fraction is 55%  · Normal right ventricular systolic function.  · Moderate size Left pleural effusion    Needs anticoagulation - Hold anticoagulation in the setting of thrombocytopenia.  Start ASA monotherapy. Will watch the PLTs for the next few days if stable or improving will consider DAPT vs NOAC.  Consult hematology for thrombocytopenia          7/14 admit to [facility] L MCA embolic stroke, s/p tpa, s/p thrombectomy L M1 multiple clots removed. ETOH use, monitor for withdrawals.  7/15 scheduled MRI wo contrast for tomorrow morning with planning for chemical DVT ppx. Continued 2% hypertonic saline and mannitol.   7/16 MRI wo contrast currently postponed, awaiting clarification that atrial device is safe to scan.  No acute events today. Continued 2% saline and mannitol.   7/17 MRI wo contrast preformed today, awaiting results. Plans to start anticoagulation following MRI per stroke team.   7/18 Cardiology consulted today for recs on whether to start AC or preform a ABDIAS to check potential thrombi on watchman.   7/19 Platelet count decreased below 100 while Heparin. D/c heparin and hold on any AC.  Gave lasix IV 20 to help with fluid overload. D/c 2%HTS  7/20 Platelet count still below 100.  HIT antibody panel pending.  Hold on ABDIAS until thrombocytopenia resolved.   7/21: Developing HA. Repeated CTH with no  significant interval changes   7/22: ABDIAS procedure tomorrow 7/23, NPO at midnight   7/23: ABDIAS complete, no thrombus seen on exam, 81 mg started, transfer to stroke pending      STROKE DOCUMENTATION   Acute Stroke Times   Last Known Normal Date: 07/13/19  Last Known Normal Time: 2130  Symptom Onset Date: 07/13/19  Symptom Onset Time: 2130  Stroke Team Called Date: 07/13/19  Stroke Team Called Time: 2245  Stroke Team Arrival Date: 07/14/19  Stroke Team Arrival Time: 0107  CT Interpretation Time: 2251  Decision to Treat Time for Alteplase: 2307(bolus given)  Decision to Treat Time for IR: 0107    NIH Scale:  1a. Level of Consciousness: 0-->Alert, keenly responsive  1b. LOC Questions: 0-->Answers both questions correctly  1c. LOC Commands: 0-->Performs both tasks correctly  2. Best Gaze: 0-->Normal  3. Visual: 1-->Partial hemianopia  4. Facial Palsy: 2-->Partial paralysis (total or near-total paralysis of lower face)  5a. Motor Arm, Left: 0-->No drift, limb holds 90 (or 45) degrees for full 10 secs  5b. Motor Arm, Right: 3-->No effort against gravity, limb falls  6a. Motor Leg, Left: 2-->Some effort against gravity, leg falls to bed by 5 secs, but has some effort against gravity  6b. Motor Leg, Right: 0-->No drift, leg holds 30 degree position for full 5 secs  7. Limb Ataxia: 0-->Absent  8. Sensory: 0-->Normal, no sensory loss  9. Best Language: 0-->No aphasia, normal  10. Dysarthria: 0-->Normal  11. Extinction and Inattention (formerly Neglect): 0-->No abnormality  Total (NIH Stroke Scale): 8       Modified [place] Score: 0  Brandin Coma Scale:15   ABCD2 Score:    [id number] Score:2  HAS -BLED Score:2  ICH Score:   Hunt & Ta Classification:          Interval History:  Stable overnight; prepared for stepdown.     Review of Systems   Constitutional: Positive for activity change.   HENT: Positive for voice change.    Eyes: Negative for pain and visual disturbance.   Respiratory: Negative for shortness of breath.     Cardiovascular: Negative for chest pain.   Gastrointestinal: Negative for abdominal pain, nausea and vomiting.   Genitourinary: Negative for flank pain.   Musculoskeletal: Negative for back pain.   Neurological: Positive for facial asymmetry and weakness.       Objective:     Vitals:  Temp: 98.8 °F (37.1 °C)  Pulse: (!) 51  Rhythm: sinus bradycardia  BP: (!) 164/91  MAP (mmHg): 122  Resp: 16  SpO2: 99 %  O2 Device (Oxygen Therapy): room air    Temp  Min: 98 °F (36.7 °C)  Max: 99.3 °F (37.4 °C)  Pulse  Min: 45  Max: 77  BP  Min: 121/66  Max: 172/94  MAP (mmHg)  Min: 85  Max: 124  Resp  Min: 11  Max: 23  SpO2  Min: 95 %  Max: 100 %    07/23 0701 - 07/24 0700  In: 425 [P.O.:225; I.V.:200]  Out: 1400 [Urine:1400]   Unmeasured Output  Urine Occurrence: 0  Stool Occurrence: 0  Emesis Occurrence: 1  Pad Count: 2       Physical Exam   Constitutional: He appears well-developed and well-nourished.   HENT:   Head: Normocephalic.   Eyes: Pupils are equal, round, and reactive to light.   Neck: Normal range of motion.   Cardiovascular: Bradycardia present.   Pulmonary/Chest: Effort normal.   Abdominal: Soft.   Neurological:   Mental Status:  Awake, follows commands  + Dysarthria   Mild R Facial Droop  Pupils 3mm, PERRL.   +Corneal reflex, +gag, +cough   RS Weakness  Decreased sensation to R       Nursing note and vitals reviewed.    Unable to test coordination, gait due to level of consciousness.    Medications:  Continuous Scheduled    aspirin 81 mg Daily   lisinopril 20 mg Daily   senna-docusate 8.6-50 mg 1 tablet BID   PRN    acetaminophen 650 mg Q6H PRN   atropine 1 mg Q2H PRN   hydrALAZINE 10 mg Q4H PRN   magnesium oxide 800 mg PRN   magnesium oxide 800 mg PRN   ondansetron 8 mg Q8H PRN   potassium chloride 10% 40 mEq PRN   potassium chloride 10% 40 mEq PRN   potassium chloride 10% 40 mEq PRN   potassium, sodium phosphates 2 packet PRN   potassium, sodium phosphates 2 packet PRN   potassium, sodium phosphates 2 packet PRN    sodium chloride 0.9% 10 mL PRN   sodium chloride 0.9% 10 mL PRN     Today I personally reviewed pertinent medications, imaging, laboratory results, notably:    Diet  Diet Dysphagia Mechanical Soft (IDDSI Level 5) [facility]; Rupesh Caceres MD  Comprehensive Stroke Center  Department of Vascular Neurology   [facility]

## 2019-07-24 NOTE — NURSING
Patient transported to [facility] from Radiology by RN and patient transport. Patient on portable monitor. VSS.

## 2019-07-24 NOTE — PLAN OF CARE
Problem: Adult Inpatient Plan of Care  Goal: Plan of Care Review  Outcome: Ongoing (interventions implemented as appropriate)  POC reviewed with pt at 0500. Pt wife verbalized understanding. Questions and concerns addressed. No acute events overnight. Neuro exam remains unchanged. A&Ox4, pt states name, and will answer appropriately with multiple choice questions for assessment questions. Hemodynamically stable. VSS. Pt had 3 loose BMs; Senna held. NPO since midnight due to pending ABD ultrasound and barium swallow study.   Pt progressing toward goals. Will continue to monitor. See flowsheets for full assessment and VS info

## 2019-07-24 NOTE — SUBJECTIVE & OBJECTIVE
Interval History:  Stable overnight; prepared for stepdown.     Review of Systems   Constitutional: Positive for activity change.   HENT: Positive for voice change.    Eyes: Negative for pain and visual disturbance.   Respiratory: Negative for shortness of breath.    Cardiovascular: Negative for chest pain.   Gastrointestinal: Negative for abdominal pain, nausea and vomiting.   Genitourinary: Negative for flank pain.   Musculoskeletal: Negative for back pain.   Neurological: Positive for facial asymmetry and weakness.       Objective:     Vitals:  Temp: 98.8 °F (37.1 °C)  Pulse: (!) 51  Rhythm: sinus bradycardia  BP: (!) 164/91  MAP (mmHg): 122  Resp: 16  SpO2: 99 %  O2 Device (Oxygen Therapy): room air    Temp  Min: 98 °F (36.7 °C)  Max: 99.3 °F (37.4 °C)  Pulse  Min: 45  Max: 77  BP  Min: 121/66  Max: 172/94  MAP (mmHg)  Min: 85  Max: 124  Resp  Min: 11  Max: 23  SpO2  Min: 95 %  Max: 100 %    07/23 0701 - 07/24 0700  In: 425 [P.O.:225; I.V.:200]  Out: 1400 [Urine:1400]   Unmeasured Output  Urine Occurrence: 0  Stool Occurrence: 0  Emesis Occurrence: 1  Pad Count: 2       Physical Exam   Constitutional: He appears well-developed and well-nourished.   HENT:   Head: Normocephalic.   Eyes: Pupils are equal, round, and reactive to light.   Neck: Normal range of motion.   Cardiovascular: Bradycardia present.   Pulmonary/Chest: Effort normal.   Abdominal: Soft.   Neurological:   Mental Status:  Awake, follows commands  + Dysarthria   Mild R Facial Droop  Pupils 3mm, PERRL.   +Corneal reflex, +gag, +cough   RS Weakness  Decreased sensation to R       Nursing note and vitals reviewed.    Unable to test coordination, gait due to level of consciousness.    Medications:  Continuous Scheduled    aspirin 81 mg Daily   lisinopril 20 mg Daily   senna-docusate 8.6-50 mg 1 tablet BID   PRN    acetaminophen 650 mg Q6H PRN   atropine 1 mg Q2H PRN   hydrALAZINE 10 mg Q4H PRN   magnesium oxide 800 mg PRN   magnesium oxide 800 mg PRN    ondansetron 8 mg Q8H PRN   potassium chloride 10% 40 mEq PRN   potassium chloride 10% 40 mEq PRN   potassium chloride 10% 40 mEq PRN   potassium, sodium phosphates 2 packet PRN   potassium, sodium phosphates 2 packet PRN   potassium, sodium phosphates 2 packet PRN   sodium chloride 0.9% 10 mL PRN   sodium chloride 0.9% 10 mL PRN     Today I personally reviewed pertinent medications, imaging, laboratory results, notably:    Diet  Diet Dysphagia Mechanical Soft (IDDSI Level 5) [facility]; [facility]

## 2019-07-25 PROBLEM — E44.0 MODERATE MALNUTRITION: Status: ACTIVE | Noted: 2019-07-25

## 2019-07-25 PROBLEM — R74.01 TRANSAMINITIS: Status: ACTIVE | Noted: 2019-07-24

## 2019-07-25 LAB
ALBUMIN SERPL BCP-MCNC: 2.5 G/DL (ref 3.5–5.2)
ALP SERPL-CCNC: 63 U/L (ref 55–135)
ALT SERPL W/O P-5'-P-CCNC: 75 U/L (ref 10–44)
ANION GAP SERPL CALC-SCNC: 8 MMOL/L (ref 8–16)
AST SERPL-CCNC: 46 U/L (ref 10–40)
BASOPHILS # BLD AUTO: 0.07 K/UL (ref 0–0.2)
BASOPHILS NFR BLD: 0.9 % (ref 0–1.9)
BILIRUB SERPL-MCNC: 0.5 MG/DL (ref 0.1–1)
BUN SERPL-MCNC: 9 MG/DL (ref 6–20)
CALCIUM SERPL-MCNC: 8.5 MG/DL (ref 8.7–10.5)
CHLORIDE SERPL-SCNC: 104 MMOL/L (ref 95–110)
CO2 SERPL-SCNC: 26 MMOL/L (ref 23–29)
CREAT SERPL-MCNC: 0.8 MG/DL (ref 0.5–1.4)
DIFFERENTIAL METHOD: ABNORMAL
EOSINOPHIL # BLD AUTO: 0.5 K/UL (ref 0–0.5)
EOSINOPHIL NFR BLD: 6.6 % (ref 0–8)
ERYTHROCYTE [DISTWIDTH] IN BLOOD BY AUTOMATED COUNT: 12.9 % (ref 11.5–14.5)
EST. GFR  (AFRICAN AMERICAN): >60 ML/MIN/1.73 M^2
EST. GFR  (NON AFRICAN AMERICAN): >60 ML/MIN/1.73 M^2
GLUCOSE SERPL-MCNC: 90 MG/DL (ref 70–110)
HCT VFR BLD AUTO: 35.3 % (ref 40–54)
HGB BLD-MCNC: 11.9 G/DL (ref 14–18)
IMM GRANULOCYTES # BLD AUTO: 0.12 K/UL (ref 0–0.04)
IMM GRANULOCYTES NFR BLD AUTO: 1.5 % (ref 0–0.5)
LYMPHOCYTES # BLD AUTO: 1.3 K/UL (ref 1–4.8)
LYMPHOCYTES NFR BLD: 16.2 % (ref 18–48)
MAGNESIUM SERPL-MCNC: 1.9 MG/DL (ref 1.6–2.6)
MCH RBC QN AUTO: 32 PG (ref 27–31)
MCHC RBC AUTO-ENTMCNC: 33.7 G/DL (ref 32–36)
MCV RBC AUTO: 95 FL (ref 82–98)
MONOCYTES # BLD AUTO: 0.8 K/UL (ref 0.3–1)
MONOCYTES NFR BLD: 10.1 % (ref 4–15)
NEUTROPHILS # BLD AUTO: 5.2 K/UL (ref 1.8–7.7)
NEUTROPHILS NFR BLD: 64.7 % (ref 38–73)
NRBC BLD-RTO: 0 /100 WBC
PHOSPHATE SERPL-MCNC: 3.9 MG/DL (ref 2.7–4.5)
PLATELET # BLD AUTO: 181 K/UL (ref 150–350)
PMV BLD AUTO: 10.6 FL (ref 9.2–12.9)
POCT GLUCOSE: 96 MG/DL (ref 70–110)
POCT GLUCOSE: 99 MG/DL (ref 70–110)
POTASSIUM SERPL-SCNC: 3.7 MMOL/L (ref 3.5–5.1)
PROT SERPL-MCNC: 5.3 G/DL (ref 6–8.4)
RBC # BLD AUTO: 3.72 M/UL (ref 4.6–6.2)
SODIUM SERPL-SCNC: 138 MMOL/L (ref 136–145)
WBC # BLD AUTO: 8.03 K/UL (ref 3.9–12.7)

## 2019-07-25 PROCEDURE — 99233 PR SUBSEQUENT HOSPITAL CARE,LEVL III: ICD-10-PCS | Mod: ,,, | Performed by: PSYCHIATRY & NEUROLOGY

## 2019-07-25 PROCEDURE — 83735 ASSAY OF MAGNESIUM: CPT

## 2019-07-25 PROCEDURE — 97112 NEUROMUSCULAR REEDUCATION: CPT

## 2019-07-25 PROCEDURE — 99252 IP/OBS CONSLTJ NEW/EST SF 35: CPT | Mod: ,,, | Performed by: NURSE PRACTITIONER

## 2019-07-25 PROCEDURE — 20600001 HC STEP DOWN PRIVATE ROOM

## 2019-07-25 PROCEDURE — 97127 HC THERAPEUTIC INTVTN, COGN FUNCTION - OT: CPT

## 2019-07-25 PROCEDURE — 97542 WHEELCHAIR MNGMENT TRAINING: CPT

## 2019-07-25 PROCEDURE — 25000003 PHARM REV CODE 250: Performed by: PSYCHIATRY & NEUROLOGY

## 2019-07-25 PROCEDURE — 92507 TX SP LANG VOICE COMM INDIV: CPT

## 2019-07-25 PROCEDURE — 97535 SELF CARE MNGMENT TRAINING: CPT

## 2019-07-25 PROCEDURE — 25000003 PHARM REV CODE 250: Performed by: NURSE PRACTITIONER

## 2019-07-25 PROCEDURE — 84100 ASSAY OF PHOSPHORUS: CPT

## 2019-07-25 PROCEDURE — 25000003 PHARM REV CODE 250: Performed by: PHYSICIAN ASSISTANT

## 2019-07-25 PROCEDURE — 99233 SBSQ HOSP IP/OBS HIGH 50: CPT | Mod: ,,, | Performed by: PSYCHIATRY & NEUROLOGY

## 2019-07-25 PROCEDURE — 80053 COMPREHEN METABOLIC PANEL: CPT

## 2019-07-25 PROCEDURE — 85025 COMPLETE CBC W/AUTO DIFF WBC: CPT

## 2019-07-25 PROCEDURE — 97530 THERAPEUTIC ACTIVITIES: CPT

## 2019-07-25 PROCEDURE — 99252 PR INITIAL INPATIENT CONSULT,LEVL II: ICD-10-PCS | Mod: ,,, | Performed by: NURSE PRACTITIONER

## 2019-07-25 PROCEDURE — 92526 ORAL FUNCTION THERAPY: CPT

## 2019-07-25 RX ORDER — FLUOXETINE HYDROCHLORIDE 20 MG/1
20 CAPSULE ORAL DAILY
Status: DISCONTINUED | OUTPATIENT
Start: 2019-07-25 | End: 2019-07-29 | Stop reason: HOSPADM

## 2019-07-25 RX ADMIN — FLUOXETINE 20 MG: 20 CAPSULE ORAL at 12:07

## 2019-07-25 RX ADMIN — SENNOSIDES,DOCUSATE SODIUM 1 TABLET: 8.6; 5 TABLET, FILM COATED ORAL at 08:07

## 2019-07-25 RX ADMIN — ASPIRIN 81 MG CHEWABLE TABLET 81 MG: 81 TABLET CHEWABLE at 08:07

## 2019-07-25 RX ADMIN — LISINOPRIL 20 MG: 20 TABLET ORAL at 08:07

## 2019-07-25 NOTE — PT/OT/SLP PROGRESS
Speech Language Pathology Treatment    Patient Name:  Justyn Rawls   MRN:  [id number]  Admitting Diagnosis: Stroke due to embolism of left middle cerebral artery    Recommendations:                 General Recommendations:  Dysphagia therapy, Speech/language therapy and Cognitive-linguistic therapy  Diet recommendations:  Regular, Liquid Diet Level: Thin   Aspiration Precautions: 1 bite/sip at a time, Small bites/sips and Standard aspiration precautions   General Precautions: Standard, aspiration, fall, aphasia  Communication strategies:  none    Subjective   Pt with no family present at bedside    Pain/Comfort:  · Pain Rating 1: 0/10  · Pain Rating Post-Intervention 1: 0/10    Objective:     Has the patient been evaluated by SLP for swallowing?   Yes  Keep patient NPO? No   Current Respiratory Status: room air      Pt asleep upon arrival however easily roused. Pt reported a poor appetite and did not eat dinner last night. Breakfast tray at bedside. SLP offered trials of thin liquid x3 via cup sips, soft solids x 2 via tsp, and cracker portion x1. Pt demonstrated no overt clinical signs of aspiration and good oral clearance. Pt able to name 0/4 items on breakfast tray independently, however named 3/4 with binary choice offering. Pt required max assist to choose drink option with breakfast demonstrating word finding difficulty. Pt able to name choice after binary choice offering.  Simple personal Y/N questions answered with 80% accuracy with min cues. Pt unable to follow 2-step simple commands and suspect motor coordination is still an issue. Pt able to name 2/3 body parts independently and 2/4 common objects independently. Pt completed automatic speech task with 100% accuracy independently demonstrating self-initiation of task. Pt demonstrating frustration this session with awareness of word-finding difficulties with considerable interjections in attempts to initiate speech. SLP comforted pt about the therapeutic  process and progress he has made. SLP education included swallowing precs and POC. RN in room at end of session and notified of progress. SLP notified stroke team of wife's request to discuss pt. Pt verbalized agreement and understanding. Speech to continue to follow.     Assessment:     Justyn Rawls is a 49 y.o. male with an SLP diagnosis of Aphasia, Dysphagia, Dysarthria and Apraxia.      Goals:   Multidisciplinary Problems     SLP Goals        Problem: SLP Goal    Goal Priority Disciplines Outcome   SLP Goal     SLP Ongoing (interventions implemented as appropriate)   Description:  Speech Language Pathology Goals  Updated Goals expected to be met by 7/29    1. Pt will participate in ongoing assessment of swallow to determine the least restrictive diet.  2. Pt will participate in ongoing assessment of speech language and cognitive linguistic skills to help rule out deficits and determine therapeutic plan of care   Updated additional goals:   1. Pt will participate in simple visual scanning tasks with 80% accuracy and mod cues.-met  2. Pt will complete OMEs x10 with SLP and independently   3. Pt will complete orientation tasks with 80% accuracy and mod cues.  4. Pt will complete simple naming tasks with 50% accuracy and mod cues.   5. Pt will follow simple two step commands with 50% accuracy and mod cues.  6. Pt will complete automatic speech tasks with 80% accuracy and mod cues.  7. Pt will answer Y/N questions with 60% accuracy and mod cues. -met  8. Pt will participate in ongoing assessment of reading and writing skills.                          Plan:     · Patient to be seen:  4 x/week   · Plan of Care expires:     · Plan of Care reviewed with:  patient   · SLP Follow-Up:  Yes       Discharge recommendations:  rehabilitation facility   Barriers to Discharge:  Level of Skilled Assistance Needed      Time Tracking:     SLP Treatment Date:   07/25/19  Speech Start Time:  0825  Speech Stop Time:  0841     Speech  Total Time (min):  16 min    Billable Minutes: Speech Therapy Individual 8 and Treatment Swallowing Dysfunction 8    ALEX Conte  07/25/2019

## 2019-07-25 NOTE — CONSULTS
Inpatient consult to Physical Medicine Rehab  Consult performed by: Dinora Patterson NP  Consult ordered by: Lianne Conti NP  Reason for consult: Debility       Reviewed patient history and current admission.  Rehab team following.  Full consult to follow.    NINI Ulrich, FNP-C  Physical Medicine & Rehabilitation   07/25/2019  Spectralink: [phone]

## 2019-07-25 NOTE — PLAN OF CARE
Problem: SLP Goal  Goal: SLP Goal  Speech Language Pathology Goals  Updated Goals expected to be met by 7/29    1. Pt will participate in ongoing assessment of swallow to determine the least restrictive diet.  2. Pt will participate in ongoing assessment of speech language and cognitive linguistic skills to help rule out deficits and determine therapeutic plan of care   Updated additional goals:   1. Pt will participate in simple visual scanning tasks with 80% accuracy and mod cues.-met  2. Pt will complete OMEs x10 with SLP and independently   3. Pt will complete orientation tasks with 80% accuracy and mod cues.  4. Pt will complete simple naming tasks with 50% accuracy and mod cues.   5. Pt will follow simple two step commands with 50% accuracy and mod cues.  6. Pt will complete automatic speech tasks with 80% accuracy and mod cues.  7. Pt will answer Y/N questions with 60% accuracy and mod cues. -met  8. Pt will participate in ongoing assessment of reading and writing skills.        Outcome: Ongoing (interventions implemented as appropriate)  Goals remain appropriate. SLP to continue to follow and continue POC.   ALEX Conte  7/25/19

## 2019-07-25 NOTE — ASSESSMENT & PLAN NOTE
- CT/CTA showed proximal L MCA occlusion.   - Received tPA.   - S/p thrombectomy w/ TICI2c flows post intervention.     See hospital course for functional, cognitive/speech/language, and nutrition/swallow status.      Recommendations  -  Encourage mobility, OOB in chair at least 3 hours per day, and early ambulation as appropriate   -  PT/OT evaluate and treat  -  SLP speech and cognitive evaluate and treat  -  Monitor sleep disturbances and establish consistent sleep-wake cycle  -  Monitor for bowel and bladder dysfunction  -  Monitor for shoulder pain and subluxation  -  Monitor for spasticity  -  Monitor for and prevent skin breakdown and pressure ulcers  · Early mobility, repositioning/weight shifting every 20-30 minutes when sitting, turn patient every 2 hours, proper mattress/overlay and chair cushioning, pressure relief/heel protector boots  -  DVT prophylaxis  -  Reviewed discharge options (IP rehab, SNF, HH therapy, and OP therapy)

## 2019-07-25 NOTE — PT/OT/SLP PROGRESS
"Physical Therapy Treatment    Patient Name:  Justyn Rawls   MRN:  [id number]    Recommendations:     Discharge Recommendations:  rehabilitation facility   Discharge Equipment Recommendations: (TBD)   Barriers to discharge: increased level of assistance required    Assessment:     Justyn Rawls is a 49 y.o. male admitted with a medical diagnosis of stroke due to embolism of left middle cerebral artery. Pt alert and following simple commands; he continues to display aphasia and mild impulsivity due to cognitive impairment. Mr. Rawls tolerated wheelchair training x 75 ft. With min A this visit.  He presents with the following impairments/functional limitations:  weakness, impaired endurance, impaired sensation, gait instability, impaired functional mobilty, impaired self care skills, impaired balance, impaired cognition, decreased lower extremity function, decreased upper extremity function, decreased safety awareness, impaired cardiopulmonary response to activity. Pt remains an excellent candidate for inpatient rehabilitation, as he has a qualifying diagnosis, displays increasing activity tolerance, is motivated to participate, and has a strong support system willing to assist the pt upon discharge.     Rehab Prognosis: Good; patient would benefit from acute skilled PT services to address these deficits and reach maximum level of function.      Recent Surgery: Procedure(s) (LRB):  ECHOCARDIOGRAM, TRANSESOPHAGEAL (N/A) 2 Days Post-Op    Plan:     During this hospitalization, patient to be seen 4 x/week to address the identified rehab impairments via gait training, therapeutic activities, therapeutic exercises, neuromuscular re-education, wheelchair management/training and progress toward the following goals:    · Plan of Care Expires:  08/13/19    Subjective     Patient/Family Comments/goals: Pt primarily responding with "yes" or "no" during session 2/2 aphasia   Pain/Comfort:  · Pain Rating 1: 0/10  · Pain Rating " Post-Intervention 1: 0/10      Objective:     Communicated with RN prior to session.  Patient found supine with peripheral IV, telemetry upon PT entry to room.     General Precautions: Standard, fall, aspiration, aphasia   Orthopedic Precautions:N/A   Braces: N/A     Functional Mobility:    Bed Mobility:   · Supine > Sit: contact guard assistance with verbal cueing for safety   · Sit > Supine: N/T     Transfers:   · Sit <> Stand Transfer: moderate assistance from EOB x 1 trial   · Bed > wheelchair squat pivot transfer: moderate assistance with no AD   · Wheelchair> bedside chair stand pivot transfer: moderate assistance with no AD     Wheelchair Mobility:   · Distance: 75 ft.   · Assistance level: minimum assistance  · Pt propelled w/c manually using LUE and LLE. Required min A to navigate pathway, adjust hand placement, and safely stop/start trial.     Therapeutic Activities and Exercises:  Therapeutic activities aimed to:  - increase pt's independence, safety, and efficiency with bed mobility and functional transfers. See above for assistance levels. Cueing required for safety awareness and to time initiation of mobility tasks due to impulsivity.   - improve functional ROM; performed PROM to RLE and RUE x 15 reps in available plans.  No active movement observed.     Wheelchair management/mobility performed as described above. Pt tolerated activity well, with no adverse effects.     Patient left up in chair with all lines intact, call button in reach, chair alarm on, RN notified. Pt's spouse arrived to room shortly after session and therapist reviewed pt progress and PT POC with pt and spouse.     AM-PAC 6 CLICK MOBILITY  Turning over in bed (including adjusting bedclothes, sheets and blankets)?: 3  Sitting down on and standing up from a chair with arms (e.g., wheelchair, bedside commode, etc.): 2  Moving from lying on back to sitting on the side of the bed?: 3  Moving to and from a bed to a chair (including a  wheelchair)?: 2  Need to walk in hospital room?: 2  Climbing 3-5 steps with a railing?: 1  Basic Mobility Total Score: 13       GOALS:   Multidisciplinary Problems     Physical Therapy Goals        Problem: Physical Therapy Goal    Goal Priority Disciplines Outcome Goal Variances Interventions   Physical Therapy Goal     PT, PT/OT Ongoing (interventions implemented as appropriate)     Description:  PT goals until 8/2/19    1. Pt supine to sit with minimal assist- MET 7/19      Updated: Pt will perform supine to sit with supervision   2. Pt sit to supine with minimal assist-not met  3. Pt sit to stand with LRAD with minimal assist-not met  4. Pt to perform gait 20ft with LRAD with max assist.-not met  5. Pt to go up/down curb step with LRAD with max assist.-not met  6. Pt to transfer bed to/from bedside chair with moderate assist.-MET 7/25     Updated: Pt will perform bed to chair transfer with contact guard assistance   7. Pt to perform B LE exs in sitting or supine x 20 reps to strengthen B LE to improve functional mobility.-not met  8. Pt will propel wheelchair x 100 feet with minimal assistance                           Time Tracking:     PT Received On: 07/25/19  PT Start Time: 0955     PT Stop Time: 1018  PT Total Time (min): 23 min     Billable Minutes: Therapeutic Activity 13 min and Train/Wheelchair Management 10 min    Treatment Type: Treatment  PT/PTA: PT     PTA Visit Number: 0     Opal Manzano PT, DPT   7/25/2019  Pager: [phone]

## 2019-07-25 NOTE — ASSESSMENT & PLAN NOTE
Patient has watch vargas device when he was in . His wife states that he received anticoagulation before the device placement with no reported side effects    Afib is a Stroke risk factor  · Rate control  CHADVASC score: 2  HAS BLED score: 2    TTE (07/14/2019):   Normal left ventricular systolic function. The estimated ejection fraction is 60%  · Normal LV diastolic function.  · Normal right ventricular systolic function.  · Mild tricuspid regurgitation.  · Intermediate central venous pressure (8 mm Hg).  · The estimated PA systolic pressure is 33 mm Hg    ABDIAS (7/23/2019):    Left atrial appendage with a well seated 21mm Watchman device. No peridevice leak on color doppler. No thrombus visualized.  · No intra-atrial shunt on bubble study with positive valsalva  · No Valvular thrombus, mass or vegation visualized  · Normal left ventricular systolic function. The estimated ejection fraction is 55%  · Normal right ventricular systolic function.  · Moderate size Left pleural effusion    Needs anticoagulation - Currently holding off an AC due to thrombocytopenia. Started on ASA 7/24. Continuing to monitor platelet count - trending up (92 -->145-->181). Will consider starting AC tomorrow if platelet count continues to normalize.

## 2019-07-25 NOTE — ASSESSMENT & PLAN NOTE
Large area of cytotoxic cerebral edema identified when reviewing brain imaging in the territory of the L middle cerebral artery. There is not mass effect associated with it. We will continue to monitor the patients clinical exam for any worsening of symptoms which may indicate expansion of the stroke or the area of the edema resulting in the clinical change. The pattern is suggestive of cardioembolic etiology.

## 2019-07-25 NOTE — SUBJECTIVE & OBJECTIVE
Neurologic Chief Complaint: L MCA    Subjective:     Interval History: Patient is seen for follow-up neurological assessment and treatment recommendations:     Platelet count trending up - continue to watch trend and start AC potentially tomorrow. AST/ALT trending down.     HPI, Past Medical, Family, and Social History remains the same as documented in the initial encounter.     Review of Systems   Constitutional: Negative for fever.   Respiratory: Negative for shortness of breath.    Cardiovascular: Negative for chest pain.   Gastrointestinal: Negative for nausea and vomiting.   Musculoskeletal: Positive for gait problem.   Neurological: Positive for facial asymmetry, speech difficulty and weakness.   Psychiatric/Behavioral: Negative for agitation.     Scheduled Meds:   aspirin  81 mg Oral Daily    lisinopril  20 mg Oral Daily    senna-docusate 8.6-50 mg  1 tablet Oral BID     Continuous Infusions:  PRN Meds:acetaminophen, atropine, hydrALAZINE, magnesium oxide, magnesium oxide, ondansetron, potassium chloride 10%, potassium chloride 10%, potassium chloride 10%, potassium, sodium phosphates, potassium, sodium phosphates, potassium, sodium phosphates, sodium chloride 0.9%    Objective:     Vital Signs (Most Recent):  Temp: 98.2 °F (36.8 °C) (07/25/19 0849)  Pulse: (!) 56 (07/25/19 0849)  Resp: 18 (07/25/19 0849)  BP: 135/83 (07/25/19 0849)  SpO2: 97 % (07/25/19 0849)  BP Location: Left arm    Vital Signs Range (Last 24H):  Temp:  [96.9 °F (36.1 °C)-98.8 °F (37.1 °C)]   Pulse:  [47-77]   Resp:  [15-22]   BP: (135-164)/(82-91)   SpO2:  [95 %-99 %]   BP Location: Left arm    Physical Exam   Constitutional: He appears well-developed.   HENT:   Head: Normocephalic and atraumatic.   Cardiovascular: Normal rate.   Pulmonary/Chest: Effort normal.   Musculoskeletal:   Right sided weakness   Neurological: He is alert.   Not oriented to place or month   Skin: Skin is warm. Capillary refill takes less than 2 seconds.    Psychiatric: He has a normal mood and affect.   Vitals reviewed.      Neurological Exam:   LOC: alert  Attention Span: Good   Language: Expressive aphasia  Articulation: Dysarthria  Orientation: Not oriented to place, Not oriented to time  Visual Fields: Full  EOM (CN III, IV, VI): Full/intact  Pupils (CN II, III): PERRL  Facial Movement (CN VII): Lower facial weakness on the Right  Motor: Arm left  Normal 5/5  Leg left  Normal 5/5  Arm right  Plegia 0/5  Leg right Paresis: 1/5  Cebellar: No evidence of appendicular or axial ataxia  Sensation: Merrill-hypoesthesia right    Laboratory:  CMP:   Recent Labs   Lab 07/25/19  0431   CALCIUM 8.5*   ALBUMIN 2.5*   PROT 5.3*      K 3.7   CO2 26      BUN 9   CREATININE 0.8   ALKPHOS 63   ALT 75*   AST 46*   BILITOT 0.5     BMP:   Recent Labs   Lab 07/25/19  0431      K 3.7      CO2 26   BUN 9   CREATININE 0.8   CALCIUM 8.5*     CBC:   Recent Labs   Lab 07/25/19  0431   WBC 8.03   RBC 3.72*   HGB 11.9*   HCT 35.3*      MCV 95   MCH 32.0*   MCHC 33.7     Lipid Panel: No results for input(s): CHOL, LDLCALC, HDL, TRIG in the last 168 hours.  Coagulation:   Recent Labs   Lab 07/20/19  1201   INR 1.1     Platelet Aggregation Study: No results for input(s): PLTAGG, PLTAGINTERP, PLTAGREGLACO, ADPPLTAGGREG in the last 168 hours.  Hgb A1C: No results for input(s): HGBA1C in the last 168 hours.  TSH: No results for input(s): TSH in the last 168 hours.    Diagnostic Results     Brain Imaging   MRI brain 7/17  Multifocal moderate to large regions of acute/recent infarction primarily left MCA territory.  There is superimposed petechial regions of hemorrhage within the left basal ganglia, insula and frontal and parietal lobes.    Please note there is superimposed small region of additional recent infarction and petechial hemorrhage in the left occipital lobe with moderate-sized remote posterior temporal infarct.    Localized mass effect without midline shift or  hydrocephalus.  Clinical correlation and follow-up advised.      Vessel Imaging   CTA head and neck 7/13  Complete occlusion of the left M1 segment origin with some reconstitution of distal cortical branches through collateral flow.    Agree with stuart.    Angiogram 7/14  Cerebral angiography demonstrated left MCA origin occlusion.  This was treated with mechanical embolectomy using a aspiration technique, as above.    There was TICI 2C reperfusion.    Cardiac Imaging   ABDIAS 7/23  · Left atrial appendage with a well seated 21mm Watchman device. No peridevice leak on color doppler. No thrombus visualized.  · No intra-atrial shunt on bubble study with positive valsalva  · No Valvular thrombus, mass or vegation visualized  · Normal left ventricular systolic function. The estimated ejection fraction is 55%  · Normal right ventricular systolic function.  · Moderate size Left pleural effusion  ·   TTE 7/14  · Normal left ventricular systolic function. The estimated ejection fraction is 60%  · Normal LV diastolic function.  · Normal right ventricular systolic function.  · Mild tricuspid regurgitation.  · Intermediate central venous pressure (8 mm Hg).  · The estimated PA systolic pressure is 33 mm Hg

## 2019-07-25 NOTE — ASSESSMENT & PLAN NOTE
Patient presents with normal PLTs count and dropped while hospitalization could be heparin related    Consider hematology consult if platelets trend down

## 2019-07-25 NOTE — HOSPITAL COURSE
7/23/19: Participated w/ PT. Bed mobility min- modA. [place]is with scooting along the EOB. Grooming modA.   7/25/19: Participated w/ OT & PT. Bed mobility CGA- Nargis. Sit to stand modA. Bed to w/c transfer modA. Propelled w/c manually Nargis 75 ft. Grooming Nargis. UBD modA. LBD maxA.   7/27/19: Participated with PT and OT. Bed mobility SBA-CGA.  Sit to stand min-modA and transfers modA.  EOB SV.  Grooming and UBD modA.  LBD maxA.

## 2019-07-25 NOTE — ASSESSMENT & PLAN NOTE
48 y/o male with L MCA stroke due to M1 occlusion received IV TPA and thrombectomy to TICI 2C, probable due to afib    Antithrombotics:  Currently holding off AC (due to thrombocytopenia), ASA started 7/24    Statins: Lipitor 40 mg daily (currently on hold due to elevated AST/ALT     Aggressive risk factor modification: A-Fib, alcohol     Rehab efforts: The patient has been evaluated by a stroke team provider and the therapy needs have been fully considered based off the presenting complaints and exam findings. The following therapy evaluations are needed: PT evaluate and treat, OT evaluate and treat, SLP evaluate and treat, PM&R evaluate for appropriate placement - recommending rehab     Diagnostics ordered/pending: NA    VTE prophylaxis: SCD's raquel began Heparin 5000 units sc Q8H on 7-15-10 at 0600 but held (07/19/2019) due to thrombocytopenia, mechanical SCDs    BP parameters: Infarct: Post sucessful thrombectomy, SBP <140

## 2019-07-25 NOTE — CONSULTS
[facility]  Physical Medicine & Rehab  Consult Note    Patient Name: Justyn Rawls  MRN: [id number]  Admission Date: 7/14/2019  Hospital Length of Stay: 11 days  Attending Physician: Ernesto Duran MD     Inpatient consult to Physical Medicine & Rehabilitation  Consult performed by: Dinora Patterson NP  Consult requested by:  Ernesto Duran MD    Collaborating Physician: Tawnya Hung MD  Reason for Consult:  Assess rehabilitation needs     Consults  Subjective:     Principal Problem: Stroke due to embolism of left middle cerebral artery    HPI: Justyn Rawls is a 49-year-old male with PMHx of left-sided occipital and temporal area CVA in 2014, Afib s/p watchman for JM closure and EtOH abuse. Patient presented to [facility] on 7/14/19 with confusion and Mild R sided weakness. CT/CTA showed proximal L MCA occlusion. Received tPA. [place] [facility] [place].  S/p thrombectomy w/ TICI2c flows post intervention. Hospital course complicated by transaminits (LFTs trending down) and thrombocytopenia (possible beginning anticoagulation 7/26 if platelet count normalizes).    Functional History: Patient lives in [place] with wife lives with his wife and children in a 1 story home with 4 in step to enter. Prior to admission, (I). DME: none.     Hospital Course:   7/23/19: Participated w/ PT. Bed mobility min- modA. Nargis with scooting along the EOB. Grooming modA.   7/25/19: Participated w/ OT. Bed mobility Nargis. Grooming Nargis. UBD modA. LBD maxA.    Past Medical History:   Diagnosis Date    CVA (cerebral infarction) 4/22/2016    Paroxysmal atrial fibrillation 4/22/2016    Stroke      Past Surgical History:   Procedure Laterality Date    watchman device       Review of patient's allergies indicates:  No Known Allergies    Scheduled Medications:    aspirin  81 mg Oral Daily    FLUoxetine  20 mg Oral Daily    lisinopril  20 mg Oral Daily    senna-docusate 8.6-50 mg  1 tablet Oral  BID       PRN Medications: acetaminophen, atropine, hydrALAZINE, magnesium oxide, magnesium oxide, ondansetron, potassium chloride 10%, potassium chloride 10%, potassium chloride 10%, potassium, sodium phosphates, potassium, sodium phosphates, potassium, sodium phosphates, sodium chloride 0.9%    Family History     Problem Relation (Age of Onset)    Cancer Father    Early death Father    Hyperlipidemia Mother    Hypertension Mother        Tobacco Use    Smoking status: Never Smoker    Smokeless tobacco: Never Used   Substance and Sexual Activity    Alcohol use: Yes     Comment: 2 times per month    Drug use: No    Sexual activity: Yes     Review of Systems   Constitutional: Positive for activity change. Negative for fatigue and fever.   HENT: Negative for trouble swallowing and voice change.    Respiratory: Negative for cough and shortness of breath.    Cardiovascular: Negative for chest pain and leg swelling.   Gastrointestinal: Negative for abdominal distention and abdominal pain.   Genitourinary: Negative for difficulty urinating and flank pain.   Musculoskeletal: Positive for gait problem. Negative for back pain.   Skin: Negative for color change and rash.   Neurological: Positive for speech difficulty and weakness.   Psychiatric/Behavioral: Negative for agitation, behavioral problems and confusion.     Objective:     Vital Signs (Most Recent):  Temp: 98.2 °F (36.8 °C) (07/25/19 0849)  Pulse: 84 (07/25/19 1056)  Resp: 18 (07/25/19 0849)  BP: 135/83 (07/25/19 0849)  SpO2: 97 % (07/25/19 0849)    Vital Signs (24h Range):  Temp:  [96.9 °F (36.1 °C)-98.8 °F (37.1 °C)] 98.2 °F (36.8 °C)  Pulse:  [47-84] 84  Resp:  [15-22] 18  SpO2:  [95 %-99 %] 97 %  BP: (135-164)/(82-91) 135/83     Body mass index is 24 kg/m².    Physical Exam   Constitutional: He appears well-developed and well-nourished.   HENT:   Head: Normocephalic and atraumatic.   Eyes: Pupils are equal, round, and reactive to light. EOM are normal. Right  eye exhibits no discharge. Left eye exhibits no discharge.   Neck: Neck supple.   Pulmonary/Chest: Effort normal. No respiratory distress.   Musculoskeletal: He exhibits no edema or deformity.   Neurological: No sensory deficit. He exhibits normal muscle tone.   - alert  - aphasia  - following commands   - RUE: 0/5.  LUE: 5/5.  RLE: 2/5.  LLE: 5/5.   Skin: Skin is warm and dry.   Psychiatric: He has a normal mood and affect. His behavior is normal.   Nursing note and vitals reviewed.    Diagnostic Results:   Labs: Reviewed  ECG: Reviewed  CT: Reviewed  MRI: Reviewed    Assessment/Plan:     * Stroke due to embolism of left middle cerebral artery  - CT/CTA showed proximal L MCA occlusion.   - Received tPA.   - S/p thrombectomy w/ TICI2c flows post intervention.     See hospital course for functional, cognitive/speech/language, and nutrition/swallow status.      Recommendations  -  Encourage mobility, OOB in chair at least 3 hours per day, and early ambulation as appropriate   -  PT/OT evaluate and treat  -  SLP speech and cognitive evaluate and treat  -  Monitor sleep disturbances and establish consistent sleep-wake cycle  -  Monitor for bowel and bladder dysfunction  -  Monitor for shoulder pain and subluxation  -  Monitor for spasticity  -  Monitor for and prevent skin breakdown and pressure ulcers  · Early mobility, repositioning/weight shifting every 20-30 minutes when sitting, turn patient every 2 hours, proper mattress/overlay and chair cushioning, pressure relief/heel protector boots  -  DVT prophylaxis  -  Reviewed discharge options (IP rehab, SNF, HH therapy, and OP therapy)    Transaminitis  - LFTs trending down    Aphasia  - SLP eval and treat    Hemiparesis, right  - PT and OT eval and treat    PAF (paroxysmal atrial fibrillation)  - possible beginning anticoagulation 7/26 if platelet count normalizes      Recommend inpatient rehab pending plan for anticoagulation.     Thank you for your consult.      Dinora Patterson NP  Department of Physical Medicine & Rehab  [facility]

## 2019-07-25 NOTE — PLAN OF CARE
Problem: Occupational Therapy Goal  Goal: Occupational Therapy Goal  Goals set 7/15 to be met by 7/29   Patient will increase functional independence with ADLs by performing:    UE Dressing with Minimal Assistance with hemiplegic technique.  LE Dressing with Minimal Assistance.  Grooming while standing with Contact Guard Assistance.   Toileting from bedside commode with Minimal Assistance for hygiene and clothing management.   Rolling to Left with Contact Guard Assistance.   Rolling to Right with Supervision.   Stand pivot transfers with Contact Guard Assistance.  Patient/Caregivers will be independent in assisting in bed mobility/positioning  Patient/Caregivers will be independent in HEP for weightbearing with RUE, PROM of RUE.       Goals remain appropriate.  JONNY Rosario  7/25/2019

## 2019-07-25 NOTE — PLAN OF CARE
ROXANNE spoke to patient and patient's wife to clarify choices on rehab-patient's wife states that their 1st choice is now [facility]. Notified Juan at [facility] and he will submit for insurance auth today.     07/25/19 1051   Post-Acute Status   Post-Acute Authorization Placement   Post-Acute Placement Status Pending Payor Review

## 2019-07-25 NOTE — SUBJECTIVE & OBJECTIVE
Past Medical History:   Diagnosis Date    CVA (cerebral infarction) 4/22/2016    Paroxysmal atrial fibrillation 4/22/2016    Stroke      Past Surgical History:   Procedure Laterality Date    watchman device       Review of patient's allergies indicates:  No Known Allergies    Scheduled Medications:    aspirin  81 mg Oral Daily    FLUoxetine  20 mg Oral Daily    lisinopril  20 mg Oral Daily    senna-docusate 8.6-50 mg  1 tablet Oral BID       PRN Medications: acetaminophen, atropine, hydrALAZINE, magnesium oxide, magnesium oxide, ondansetron, potassium chloride 10%, potassium chloride 10%, potassium chloride 10%, potassium, sodium phosphates, potassium, sodium phosphates, potassium, sodium phosphates, sodium chloride 0.9%    Family History     Problem Relation (Age of Onset)    Cancer Father    Early death Father    Hyperlipidemia Mother    Hypertension Mother        Tobacco Use    Smoking status: Never Smoker    Smokeless tobacco: Never Used   Substance and Sexual Activity    Alcohol use: Yes     Comment: 2 times per month    Drug use: No    Sexual activity: Yes     Review of Systems   Constitutional: Positive for activity change. Negative for fatigue and fever.   HENT: Negative for trouble swallowing and voice change.    Respiratory: Negative for cough and shortness of breath.    Cardiovascular: Negative for chest pain and leg swelling.   Gastrointestinal: Negative for abdominal distention and abdominal pain.   Genitourinary: Negative for difficulty urinating and flank pain.   Musculoskeletal: Positive for gait problem. Negative for back pain.   Skin: Negative for color change and rash.   Neurological: Positive for speech difficulty and weakness.   Psychiatric/Behavioral: Negative for agitation, behavioral problems and confusion.     Objective:     Vital Signs (Most Recent):  Temp: 98.2 °F (36.8 °C) (07/25/19 0849)  Pulse: 84 (07/25/19 1056)  Resp: 18 (07/25/19 0849)  BP: 135/83 (07/25/19 0849)  SpO2:  97 % (07/25/19 0849)    Vital Signs (24h Range):  Temp:  [96.9 °F (36.1 °C)-98.8 °F (37.1 °C)] 98.2 °F (36.8 °C)  Pulse:  [47-84] 84  Resp:  [15-22] 18  SpO2:  [95 %-99 %] 97 %  BP: (135-164)/(82-91) 135/83     Body mass index is 24 kg/m².    Physical Exam   Constitutional: He appears well-developed and well-nourished.   HENT:   Head: Normocephalic and atraumatic.   Eyes: Pupils are equal, round, and reactive to light. EOM are normal. Right eye exhibits no discharge. Left eye exhibits no discharge.   Neck: Neck supple.   Pulmonary/Chest: Effort normal. No respiratory distress.   Musculoskeletal: He exhibits no edema or deformity.   Neurological: No sensory deficit. He exhibits normal muscle tone.   - alert  - aphasia  - following commands   - RUE: 0/5.  LUE: 5/5.  RLE: 2/5.  LLE: 5/5.   Skin: Skin is warm and dry.   Psychiatric: He has a normal mood and affect. His behavior is normal.   Nursing note and vitals reviewed.    NEUROLOGICAL EXAMINATION:     CRANIAL NERVES     CN III, IV, VI   Pupils are equal, round, and reactive to light.  Extraocular motions are normal.       Diagnostic Results:   Labs: Reviewed  ECG: Reviewed  CT: Reviewed  MRI: Reviewed

## 2019-07-25 NOTE — PLAN OF CARE
Problem: Adult Inpatient Plan of Care  Goal: Plan of Care Review  Assessment and Plan     Nutrition Problem:  Moderate Protein-Calorie Malnutrition  Malnutrition in the context of Acute Illness/Injury    Related to (etiology):  Poor intake in setting of stroke/alcohol withdrawal    Signs and Symptoms (as evidenced by):  Energy Intake: <50% of estimated energy requirement for 10 days  Body Fat Depletion: mild depletion of orbitals, triceps   Muscle Mass Depletion: mild depletion of temples, lower extremities    Interventions/Recommendations (treatment strategy):  Collaboration of care to providers.    Nutrition Diagnosis Status:  New    Recommendations     1. Continue regular diet with Boost Plus TID. 2. Discontinue Boost Plus and add Boost Pudding TID.  Goals: Pt to meet % EEN and EPN by RD follow-up.   Nutrition Goal Status: goal met  Communication of RD Recs: other (comment)(POC)

## 2019-07-25 NOTE — PROGRESS NOTES
[facility]  Vascular Neurology  Comprehensive Stroke Center  Progress Note    Assessment/Plan:     * Stroke due to embolism of left middle cerebral artery  48 y/o male with L MCA stroke due to M1 occlusion received IV TPA and thrombectomy to TICI 2C, probable due to afib    Antithrombotics:  Currently holding off AC (due to thrombocytopenia), ASA started 7/24    Statins: Lipitor 40 mg daily (currently on hold due to elevated AST/ALT     Aggressive risk factor modification: A-Fib, alcohol     Rehab efforts: The patient has been evaluated by a stroke team provider and the therapy needs have been fully considered based off the presenting complaints and exam findings. The following therapy evaluations are needed: PT evaluate and treat, OT evaluate and treat, SLP evaluate and treat, PM&R evaluate for appropriate placement - recommending rehab     Diagnostics ordered/pending: NA    VTE prophylaxis: SCD's raquel began Heparin 5000 units sc Q8H on 7-15-10 at 0600 but held (07/19/2019) due to thrombocytopenia, mechanical SCDs    BP parameters: Infarct: Post sucessful thrombectomy, SBP <140        Transaminitis  US abdomen (7/23): No significant abnormality of the abdominal organs. Trace right pleural effusion.    Statin held and currently trending liver enzymes - trending down     Dysphagia  2/2 stroke   Aggressive SLP   Approved for regular thin     Hypertension  Stroke risk factor  SBP <140   On Lisinopril 20 mg - can titrate as needed     Thrombocytopenia  Patient presents with normal PLTs count and dropped while hospitalization could be heparin related    Consider hematology consult if platelets trend down    Cytotoxic brain edema  Large area of cytotoxic cerebral edema identified when reviewing brain imaging in the territory of the L middle cerebral artery. There is not mass effect associated with it. We will continue to monitor the patients clinical exam for any worsening of symptoms which may  indicate expansion of the stroke or the area of the edema resulting in the clinical change. The pattern is suggestive of cardioembolic etiology.        Aphasia  Due to stroke  Aggressive therapy    Hemiparesis, right  Due to stroke  Aggressive therapy    PAF (paroxysmal atrial fibrillation)  Patient has watch vargas device when he was in . His wife states that he received anticoagulation before the device placement with no reported side effects    Afib is a Stroke risk factor  · Rate control  CHADVASC score: 2  HAS BLED score: 2    TTE (07/14/2019):   Normal left ventricular systolic function. The estimated ejection fraction is 60%  · Normal LV diastolic function.  · Normal right ventricular systolic function.  · Mild tricuspid regurgitation.  · Intermediate central venous pressure (8 mm Hg).  · The estimated PA systolic pressure is 33 mm Hg    ABDIAS (7/23/2019):    Left atrial appendage with a well seated 21mm Watchman device. No peridevice leak on color doppler. No thrombus visualized.  · No intra-atrial shunt on bubble study with positive valsalva  · No Valvular thrombus, mass or vegation visualized  · Normal left ventricular systolic function. The estimated ejection fraction is 55%  · Normal right ventricular systolic function.  · Moderate size Left pleural effusion    Needs anticoagulation - Currently holding off an AC due to thrombocytopenia. Started on ASA 7/24. Continuing to monitor platelet count - trending up (92 -->145-->181). Will consider starting AC tomorrow if platelet count continues to normalize.           7/14 admit to [facility] L MCA embolic stroke, s/p tpa, s/p thrombectomy L M1 multiple clots removed. ETOH use, monitor for withdrawals.  7/15 scheduled MRI wo contrast for tomorrow morning with planning for chemical DVT ppx. Continued 2% hypertonic saline and mannitol.   7/16 MRI wo contrast currently postponed, awaiting clarification that atrial device is safe to scan.  No acute events today.  Continued 2% saline and mannitol.   7/17 MRI wo contrast preformed today, awaiting results. Plans to start anticoagulation following MRI per stroke team.   7/18 Cardiology consulted today for recs on whether to start AC or preform a ABDIAS to check potential thrombi on watchman.   7/19 Platelet count decreased below 100 while Heparin. D/c heparin and hold on any AC.  Gave lasix IV 20 to help with fluid overload. D/c 2%HTS  7/20 Platelet count still below 100.  HIT antibody panel pending.  Hold on ABDIAS until thrombocytopenia resolved.   7/21: Developing HA. Repeated CTH with no significant interval changes   7/22: ABDIAS procedure tomorrow 7/23, NPO at midnight   7/23: ABDIAS complete, no thrombus seen on exam, 81 mg started, transfer to stroke pending   7/25 - Platelet count trending up - continue to watch trend and start AC potentially tomorrow. AST/ALT trending down.     STROKE DOCUMENTATION   Acute Stroke Times   Last Known Normal Date: 07/13/19  Last Known Normal Time: 2130  Symptom Onset Date: 07/13/19  Symptom Onset Time: 2130  Stroke Team Called Date: 07/13/19  Stroke Team Called Time: 2245  Stroke Team Arrival Date: 07/14/19  Stroke Team Arrival Time: 0107  CT Interpretation Time: 2251  Decision to Treat Time for Alteplase: 2307(bolus given)  Decision to Treat Time for IR: 0107    NIH Scale:  1a. Level of Consciousness: 0-->Alert, keenly responsive  1b. LOC Questions: 0-->Answers both questions correctly  1c. LOC Commands: 0-->Performs both tasks correctly  2. Best Gaze: 0-->Normal  3. Visual: 1-->Partial hemianopia  4. Facial Palsy: 2-->Partial paralysis (total or near-total paralysis of lower face)  5a. Motor Arm, Left: 0-->No drift, limb holds 90 (or 45) degrees for full 10 secs  5b. Motor Arm, Right: 4-->No movement  6a. Motor Leg, Left: 0-->No drift, leg holds 30 degree position for full 5 secs  6b. Motor Leg, Right: 3-->No effort against gravity, leg falls to bed immediately  7. Limb Ataxia: 0-->Absent  8.  Sensory: 1-->Mild-to-moderate sensory loss, patient feels pinprick is less sharp or is dull on the affected side, or there is a loss of superficial pain with pinprick, but patient is aware of being touched  9. Best Language: 1-->Mild-to-moderate aphasia, some obvious loss of fluency or facility of comprehension, without significant limitation on ideas expressed or form of expression. Reduction of speech and/or comprehension, however, makes conversation. . . (see row details)  10. Dysarthria: 1-->Mild-to-moderate dysarthria, patient slurs at least some words and, at worst, can be understood with some difficulty  11. Extinction and Inattention (formerly Neglect): 0-->No abnormality  Total (NIH Stroke Scale): 13       Modified Frontier Score: 0  Brandin Coma Scale:    ABCD2 Score:    [id number] Score:2  HAS -BLED Score:2  ICH Score:   Hunt & Ta Classification:      Hemorrhagic change of an Ischemic Stroke: Does this patient have an ischemic stroke with hemorrhagic changes? No     Neurologic Chief Complaint: L MCA    Subjective:     Interval History: Patient is seen for follow-up neurological assessment and treatment recommendations:     Platelet count trending up - continue to watch trend and start AC potentially tomorrow. AST/ALT trending down.     HPI, Past Medical, Family, and Social History remains the same as documented in the initial encounter.     Review of Systems   Constitutional: Negative for fever.   Respiratory: Negative for shortness of breath.    Cardiovascular: Negative for chest pain.   Gastrointestinal: Negative for nausea and vomiting.   Musculoskeletal: Positive for gait problem.   Neurological: Positive for facial asymmetry, speech difficulty and weakness.   Psychiatric/Behavioral: Negative for agitation.     Scheduled Meds:   aspirin  81 mg Oral Daily    lisinopril  20 mg Oral Daily    senna-docusate 8.6-50 mg  1 tablet Oral BID     Continuous Infusions:  PRN Meds:acetaminophen, atropine,  hydrALAZINE, magnesium oxide, magnesium oxide, ondansetron, potassium chloride 10%, potassium chloride 10%, potassium chloride 10%, potassium, sodium phosphates, potassium, sodium phosphates, potassium, sodium phosphates, sodium chloride 0.9%    Objective:     Vital Signs (Most Recent):  Temp: 98.2 °F (36.8 °C) (07/25/19 0849)  Pulse: (!) 56 (07/25/19 0849)  Resp: 18 (07/25/19 0849)  BP: 135/83 (07/25/19 0849)  SpO2: 97 % (07/25/19 0849)  BP Location: Left arm    Vital Signs Range (Last 24H):  Temp:  [96.9 °F (36.1 °C)-98.8 °F (37.1 °C)]   Pulse:  [47-77]   Resp:  [15-22]   BP: (135-164)/(82-91)   SpO2:  [95 %-99 %]   BP Location: Left arm    Physical Exam   Constitutional: He appears well-developed.   HENT:   Head: Normocephalic and atraumatic.   Cardiovascular: Normal rate.   Pulmonary/Chest: Effort normal.   Musculoskeletal:   Right sided weakness   Neurological: He is alert.   Not oriented to place or month   Skin: Skin is warm. Capillary refill takes less than 2 seconds.   Psychiatric: He has a normal mood and affect.   Vitals reviewed.      Neurological Exam:   LOC: alert  Attention Span: Good   Language: Expressive aphasia  Articulation: Dysarthria  Orientation: Not oriented to place, Not oriented to time  Visual Fields: Full  EOM (CN III, IV, VI): Full/intact  Pupils (CN II, III): PERRL  Facial Movement (CN VII): Lower facial weakness on the Right  Motor: Arm left  Normal 5/5  Leg left  Normal 5/5  Arm right  Plegia 0/5  Leg right Paresis: 1/5  Cebellar: No evidence of appendicular or axial ataxia  Sensation: Merrill-hypoesthesia right    Laboratory:  CMP:   Recent Labs   Lab 07/25/19  0431   CALCIUM 8.5*   ALBUMIN 2.5*   PROT 5.3*      K 3.7   CO2 26      BUN 9   CREATININE 0.8   ALKPHOS 63   ALT 75*   AST 46*   BILITOT 0.5     BMP:   Recent Labs   Lab 07/25/19  0431      K 3.7      CO2 26   BUN 9   CREATININE 0.8   CALCIUM 8.5*     CBC:   Recent Labs   Lab 07/25/19  0431   WBC 8.03    RBC 3.72*   HGB 11.9*   HCT 35.3*      MCV 95   MCH 32.0*   MCHC 33.7     Lipid Panel: No results for input(s): CHOL, LDLCALC, HDL, TRIG in the last 168 hours.  Coagulation:   Recent Labs   Lab 07/20/19  1201   INR 1.1     Platelet Aggregation Study: No results for input(s): PLTAGG, PLTAGINTERP, PLTAGREGLACO, ADPPLTAGGREG in the last 168 hours.  Hgb A1C: No results for input(s): HGBA1C in the last 168 hours.  TSH: No results for input(s): TSH in the last 168 hours.    Diagnostic Results     Brain Imaging   MRI brain 7/17  Multifocal moderate to large regions of acute/recent infarction primarily left MCA territory.  There is superimposed petechial regions of hemorrhage within the left basal ganglia, insula and frontal and parietal lobes.    Please note there is superimposed small region of additional recent infarction and petechial hemorrhage in the left occipital lobe with moderate-sized remote posterior temporal infarct.    Localized mass effect without midline shift or hydrocephalus.  Clinical correlation and follow-up advised.      Vessel Imaging   CTA head and neck 7/13  Complete occlusion of the left M1 segment origin with some reconstitution of distal cortical branches through collateral flow.    Agree with nighthawk.    Angiogram 7/14  Cerebral angiography demonstrated left MCA origin occlusion.  This was treated with mechanical embolectomy using a aspiration technique, as above.    There was TICI 2C reperfusion.    Cardiac Imaging   ABDIAS 7/23  · Left atrial appendage with a well seated 21mm Watchman device. No peridevice leak on color doppler. No thrombus visualized.  · No intra-atrial shunt on bubble study with positive valsalva  · No Valvular thrombus, mass or vegation visualized  · Normal left ventricular systolic function. The estimated ejection fraction is 55%  · Normal right ventricular systolic function.  · Moderate size Left pleural effusion  ·   TTE 7/14  · Normal left ventricular systolic  function. The estimated ejection fraction is 60%  · Normal LV diastolic function.  · Normal right ventricular systolic function.  · Mild tricuspid regurgitation.  · Intermediate central venous pressure (8 mm Hg).  · The estimated PA systolic pressure is 33 mm Hg      Lianne Conti NP  [facility]  Department of Vascular Neurology   [facility]

## 2019-07-25 NOTE — ASSESSMENT & PLAN NOTE
US abdomen (7/23): No significant abnormality of the abdominal organs. Trace right pleural effusion.    Statin held and currently trending liver enzymes - trending down

## 2019-07-25 NOTE — PLAN OF CARE
Problem: Physical Therapy Goal  Goal: Physical Therapy Goal  PT goals until 8/2/19    1. Pt supine to sit with minimal assist- MET 7/19      Updated: Pt will perform supine to sit with supervision   2. Pt sit to supine with minimal assist-not met  3. Pt sit to stand with LRAD with minimal assist-not met  4. Pt to perform gait 20ft with LRAD with max assist.-not met  5. Pt to go up/down curb step with LRAD with max assist.-not met  6. Pt to transfer bed to/from bedside chair with moderate assist.-MET 7/25     Updated: Pt will perform bed to chair transfer with contact guard assistance   7. Pt to perform B LE exs in sitting or supine x 20 reps to strengthen B LE to improve functional mobility.-not met  8. Pt will propel wheelchair x 100 feet with minimal assistance         Outcome: Ongoing (interventions implemented as appropriate)  Goals updated; continue current POC.     Opal Manzano PT, DPT   7/25/2019  Pager: [phone]

## 2019-07-25 NOTE — PLAN OF CARE
Problem: Adjustment to Illness (Stroke, Ischemic/Transient Ischemic Attack)  Goal: Optimal Coping  Outcome: Ongoing (interventions implemented as appropriate)  Pt encouraged to verbalize concerns and needs, all addressed appropriately.     Problem: Functional Ability Impaired (Stroke, Ischemic/Transient Ischemic Attack)  Goal: Optimal Functional Ability  Outcome: Ongoing (interventions implemented as appropriate)  Pt encouraged to perform tasks independently with assistance provided as need. Pt. Able to complete tasks using LUE with allotted rest periods implemented.     Problem: Pain (Stroke, Ischemic/Transient Ischemic Attack)  Goal: Acceptable Pain Control  Outcome: Ongoing (interventions implemented as appropriate)  Pain assessed and addressed as per MD order. Pt. Maintained 0/10 pain rating throughout shift.

## 2019-07-25 NOTE — PLAN OF CARE
07/25/19 1634   Post-Acute Status   Post-Acute Authorization Placement   Post-Acute Placement Status Pending Payor Review        Awaiting auth for [facility]. SW in contact with CM and Medical staff. Will continue to follow and offer support as needed.     Ayaan Flores, MYLENE  [facility]   Ext. [phone]

## 2019-07-25 NOTE — HPI
Justyn Rawls is a 49-year-old male with PMHx of left-sided occipital and temporal area CVA in 2014, Afib s/p watchman for JM closure and EtOH abuse. Patient presented to [facility] on 7/14/19 with confusion and Mild R sided weakness. CT/CTA showed proximal L MCA occlusion. Received tPA. [place] [facility] [place].  S/p thrombectomy w/ TICI2c flows post intervention. Hospital course complicated by transaminits (LFTs trending down) and thrombocytopenia (possible beginning anticoagulation 7/26 if platelet count normalizes).    Functional History: Patient lives in [place] with wife lives with his wife and children in a 1 story home with 4 in step to enter. Prior to admission, (I). DME: none.

## 2019-07-25 NOTE — PT/OT/SLP PROGRESS
"Occupational Therapy   Treatment    Name: Justyn Rawls  MRN: [id number]  Admitting Diagnosis:  Stroke due to embolism of left middle cerebral artery  2 Days Post-Op    Recommendations:     Discharge Recommendations: rehabilitation facility  Discharge Equipment Recommendations:  tub bench  Barriers to discharge:  Inaccessible home environment, Decreased caregiver support    Assessment:     Justyn Rawls is a 49 y.o. male with a medical diagnosis of Stroke due to embolism of left middle cerebral artery.  He presents with performance deficits affecting function are weakness, impaired self care skills, impaired balance, decreased coordination, decreased safety awareness, decreased ROM, impaired endurance, impaired functional mobilty, visual deficits, decreased upper extremity function, decreased lower extremity function, impaired cognition, gait instability, impaired sensation, abnormal tone, impaired fine motor, impaired coordination.     Rehab Prognosis:  Good; patient would benefit from acute skilled OT services to address these deficits and reach maximum level of function.       Plan:     Patient to be seen 4 x/week to address the above listed problems via self-care/home management, neuromuscular re-education, cognitive retraining, therapeutic activities, therapeutic exercises, sensory integration  · Plan of Care Expires: 08/13/19  · Plan of Care Reviewed with: patient    Subjective   Wife:  "We're hoping to get to [facility] soon."  Pain/Comfort:  · Pain Rating 1: 0/10  · Pain Rating Post-Intervention 1: 0/10    Objective:     Communicated with: Nurse prior to session.  Patient found supine with bed alarm, peripheral IV, telemetry upon OT entry to room.  Wife present.    General Precautions: Standard, aspiration, fall, aphasia   Orthopedic Precautions:N/A   Braces: N/A     Occupational Performance:     Bed Mobility:    · Patient completed Rolling/Turning to Left with  minimum assistance  · Patient completed " Scooting/Bridging with minimum assistance  · Patient completed Supine to Sit with minimum assistance  · Patient completed Sit to Supine with minimum assistance     Functional Mobility/Transfers:  · Min assist with scooting along the EOB    Activities of Daily Living:  · Grooming: minimum assistance while seated EOB with right UE in weight bearing  · Upper Body Dressing: moderate assistance while seated EOB  · Lower Body Dressing: maximal assistance while seated EOB      AMPAC 6 Click ADL: 13    Treatment & Education:  Patient education provided on role of OT and need for rehab upon discharge.  Patient education provided on hemiplegic dressing technique, right UE weight bearing / positioning, postural control, and transfers.  Continued education, patient/ family training recommended. Patient alert; able to follow 2/4 one step commands. Patient oriented x person and place.  Daily orientation provided. PROM performed right UE one set x 10 rep in all planes of motion with stretches provided at end range; sustained stretch provided for external rotation.  Assistance and facilitation provided for upward rotation of the scapula during shoulder flexion and abduction. Addressed right UE weight bearing while seated EOB.   Able to identify 20/20 items on visual scanning task.  Able to sequence 6/7 days of the week and 10/12 months of the year.  Unable to name common objects, 0/4.  Able to write name with model provided.  Positioning provided for midline orientation with bilateral UEs elevated and heels lifted off mattress.   White board updated in patient's room.      Patient left supine with all lines intact, call button in reach and bed alarm onEducation:      GOALS:   Multidisciplinary Problems     Occupational Therapy Goals        Problem: Occupational Therapy Goal    Goal Priority Disciplines Outcome Interventions   Occupational Therapy Goal     OT, PT/OT     Description:  Goals set 7/25 to be addressed in 14 days,  expiring 8/8:   Patient will increase functional independence with ADLs by performing:    UE Dressing with Minimal Assistance with hemiplegic technique.  LE Dressing with Minimal Assistance.  Grooming while standing with Contact Guard Assistance.   Toileting from bedside commode with Minimal Assistance for hygiene and clothing management.   Rolling to Left with Contact Guard Assistance.   Rolling to Right with Supervision.   Stand pivot transfers with Contact Guard Assistance.  Patient/Caregivers will be independent in assisting in bed mobility/positioning  Patient/Caregivers will be independent in HEP for weightbearing with RUE, PROM of RUE.                         Time Tracking:     OT Date of Treatment: 07/25/19  OT Start Time: 0635  OT Stop Time: 0731  OT Total Time (min): 56 min    Billable Minutes:Self Care/Home Management 25  Neuromuscular Re-education 15  Cognitive Retraining 16    JONNY Rosario  7/25/2019

## 2019-07-26 LAB
ALBUMIN SERPL BCP-MCNC: 2.5 G/DL (ref 3.5–5.2)
ALP SERPL-CCNC: 65 U/L (ref 55–135)
ALT SERPL W/O P-5'-P-CCNC: 63 U/L (ref 10–44)
ANION GAP SERPL CALC-SCNC: 9 MMOL/L (ref 8–16)
AST SERPL-CCNC: 40 U/L (ref 10–40)
BASOPHILS # BLD AUTO: 0.06 K/UL (ref 0–0.2)
BASOPHILS NFR BLD: 0.7 % (ref 0–1.9)
BILIRUB SERPL-MCNC: 0.4 MG/DL (ref 0.1–1)
BUN SERPL-MCNC: 8 MG/DL (ref 6–20)
CALCIUM SERPL-MCNC: 8.3 MG/DL (ref 8.7–10.5)
CHLORIDE SERPL-SCNC: 105 MMOL/L (ref 95–110)
CO2 SERPL-SCNC: 23 MMOL/L (ref 23–29)
CREAT SERPL-MCNC: 0.8 MG/DL (ref 0.5–1.4)
DIFFERENTIAL METHOD: ABNORMAL
EOSINOPHIL # BLD AUTO: 0.6 K/UL (ref 0–0.5)
EOSINOPHIL NFR BLD: 6.9 % (ref 0–8)
ERYTHROCYTE [DISTWIDTH] IN BLOOD BY AUTOMATED COUNT: 13 % (ref 11.5–14.5)
EST. GFR  (AFRICAN AMERICAN): >60 ML/MIN/1.73 M^2
EST. GFR  (NON AFRICAN AMERICAN): >60 ML/MIN/1.73 M^2
GLUCOSE SERPL-MCNC: 87 MG/DL (ref 70–110)
HCT VFR BLD AUTO: 35.7 % (ref 40–54)
HGB BLD-MCNC: 11.6 G/DL (ref 14–18)
IMM GRANULOCYTES # BLD AUTO: 0.13 K/UL (ref 0–0.04)
IMM GRANULOCYTES NFR BLD AUTO: 1.6 % (ref 0–0.5)
LYMPHOCYTES # BLD AUTO: 1.4 K/UL (ref 1–4.8)
LYMPHOCYTES NFR BLD: 16.4 % (ref 18–48)
MAGNESIUM SERPL-MCNC: 1.9 MG/DL (ref 1.6–2.6)
MCH RBC QN AUTO: 31.8 PG (ref 27–31)
MCHC RBC AUTO-ENTMCNC: 32.5 G/DL (ref 32–36)
MCV RBC AUTO: 98 FL (ref 82–98)
MONOCYTES # BLD AUTO: 0.8 K/UL (ref 0.3–1)
MONOCYTES NFR BLD: 9.8 % (ref 4–15)
NEUTROPHILS # BLD AUTO: 5.3 K/UL (ref 1.8–7.7)
NEUTROPHILS NFR BLD: 64.6 % (ref 38–73)
NRBC BLD-RTO: 0 /100 WBC
PHOSPHATE SERPL-MCNC: 4 MG/DL (ref 2.7–4.5)
PLATELET # BLD AUTO: 194 K/UL (ref 150–350)
PMV BLD AUTO: 10.4 FL (ref 9.2–12.9)
POCT GLUCOSE: 89 MG/DL (ref 70–110)
POCT GLUCOSE: 91 MG/DL (ref 70–110)
POTASSIUM SERPL-SCNC: 3.8 MMOL/L (ref 3.5–5.1)
PROT SERPL-MCNC: 5.6 G/DL (ref 6–8.4)
RBC # BLD AUTO: 3.65 M/UL (ref 4.6–6.2)
SODIUM SERPL-SCNC: 137 MMOL/L (ref 136–145)
WBC # BLD AUTO: 8.23 K/UL (ref 3.9–12.7)

## 2019-07-26 PROCEDURE — 36415 COLL VENOUS BLD VENIPUNCTURE: CPT

## 2019-07-26 PROCEDURE — 99233 PR SUBSEQUENT HOSPITAL CARE,LEVL III: ICD-10-PCS | Mod: ,,, | Performed by: PSYCHIATRY & NEUROLOGY

## 2019-07-26 PROCEDURE — 25000003 PHARM REV CODE 250: Performed by: NURSE PRACTITIONER

## 2019-07-26 PROCEDURE — 85025 COMPLETE CBC W/AUTO DIFF WBC: CPT

## 2019-07-26 PROCEDURE — 99232 SBSQ HOSP IP/OBS MODERATE 35: CPT | Mod: ,,, | Performed by: NURSE PRACTITIONER

## 2019-07-26 PROCEDURE — 20600001 HC STEP DOWN PRIVATE ROOM

## 2019-07-26 PROCEDURE — 99233 SBSQ HOSP IP/OBS HIGH 50: CPT | Mod: ,,, | Performed by: PSYCHIATRY & NEUROLOGY

## 2019-07-26 PROCEDURE — 80053 COMPREHEN METABOLIC PANEL: CPT

## 2019-07-26 PROCEDURE — 83735 ASSAY OF MAGNESIUM: CPT

## 2019-07-26 PROCEDURE — 99232 PR SUBSEQUENT HOSPITAL CARE,LEVL II: ICD-10-PCS | Mod: ,,, | Performed by: NURSE PRACTITIONER

## 2019-07-26 PROCEDURE — 84100 ASSAY OF PHOSPHORUS: CPT

## 2019-07-26 PROCEDURE — 25000003 PHARM REV CODE 250: Performed by: PHYSICIAN ASSISTANT

## 2019-07-26 RX ADMIN — ACETAMINOPHEN 650 MG: 325 TABLET ORAL at 02:07

## 2019-07-26 RX ADMIN — APIXABAN 5 MG: 5 TABLET, FILM COATED ORAL at 08:07

## 2019-07-26 RX ADMIN — FLUOXETINE 20 MG: 20 CAPSULE ORAL at 11:07

## 2019-07-26 RX ADMIN — LISINOPRIL 20 MG: 20 TABLET ORAL at 11:07

## 2019-07-26 RX ADMIN — APIXABAN 5 MG: 5 TABLET, FILM COATED ORAL at 02:07

## 2019-07-26 NOTE — PLAN OF CARE
Problem: Adult Inpatient Plan of Care  Goal: Plan of Care Review  Outcome: Ongoing (interventions implemented as appropriate)  POC reviewed with patient and spouse, verbalized understanding. Patient awake and alert; oriented to self. Patient with expressive aphasia; no pain complaints at this time. Right side hemiparesis, passive ROM performed. Fall precautions maintained, bed locked and in low position. Side rails up and locked X2. Call light and personal belongings within reach. Spouse remains at bedside; will continue to monitor.

## 2019-07-26 NOTE — ASSESSMENT & PLAN NOTE
50 y/o male with L MCA stroke due to M1 occlusion received IV TPA and thrombectomy to TICI 2C, probable due to afib    Antithrombotics:  Eliquis 5 mg BID started 7/26    Statins: Lipitor 40 mg daily (currently on hold due to elevated AST/ALT)     Aggressive risk factor modification: A-Fib, alcohol     Rehab efforts: The patient has been evaluated by a stroke team provider and the therapy needs have been fully considered based off the presenting complaints and exam findings. The following therapy evaluations are needed: PT evaluate and treat, OT evaluate and treat, SLP evaluate and treat, PM&R evaluate for appropriate placement - recommending rehab     Diagnostics ordered/pending: NA    VTE prophylaxis: Eliquis 5 mg BID, mechanical SCDs    BP parameters: Infarct: Post sucessful thrombectomy, SBP <140

## 2019-07-26 NOTE — SUBJECTIVE & OBJECTIVE
Neurologic Chief Complaint: L MCA    Subjective:     Interval History: Patient is seen for follow-up neurological assessment and treatment recommendations:     Platelet count 194 today. Started Eliquis 5 mg BID - discussed w/ wife. Continue to monitor daily CBC while inpatient and every 72 hours when discharge to rehab.     HPI, Past Medical, Family, and Social History remains the same as documented in the initial encounter.     Review of Systems   Constitutional: Negative for fever.   Respiratory: Negative for shortness of breath.    Cardiovascular: Negative for chest pain.   Gastrointestinal: Negative for nausea and vomiting.   Musculoskeletal: Positive for gait problem.   Neurological: Positive for facial asymmetry, speech difficulty and weakness.   Psychiatric/Behavioral: Negative for agitation.     Scheduled Meds:   apixaban  5 mg Oral BID    FLUoxetine  20 mg Oral Daily    lisinopril  20 mg Oral Daily    senna-docusate 8.6-50 mg  1 tablet Oral BID     Continuous Infusions:  PRN Meds:acetaminophen, atropine, hydrALAZINE, ondansetron, sodium chloride 0.9%    Objective:     Vital Signs (Most Recent):  Temp: 98.5 °F (36.9 °C) (07/25/19 2003)  Pulse: (!) 49 (07/26/19 0729)  Resp: 16 (07/26/19 0459)  BP: (!) 142/80 (07/26/19 0459)  SpO2: 97 % (07/26/19 0459)  BP Location: Left arm    Vital Signs Range (Last 24H):  Temp:  [98 °F (36.7 °C)-98.5 °F (36.9 °C)]   Pulse:  [49-84]   Resp:  [16-18]   BP: (120-142)/(74-80)   SpO2:  [96 %-97 %]   BP Location: Left arm    Physical Exam   Constitutional: He appears well-developed.   HENT:   Head: Normocephalic and atraumatic.   Cardiovascular: Normal rate.   Pulmonary/Chest: Effort normal.   Musculoskeletal:   Right sided weakness   Neurological: He is alert.   Not oriented to place or month   Skin: Skin is warm. Capillary refill takes less than 2 seconds.   Psychiatric: He has a normal mood and affect.   Vitals reviewed.      Neurological Exam:   LOC: alert  Attention  Span: Good   Language: Expressive aphasia  Articulation: Dysarthria  Orientation: Not oriented to place, Not oriented to time  Visual Fields: Full  EOM (CN III, IV, VI): Full/intact  Pupils (CN II, III): PERRL  Facial Movement (CN VII): Lower facial weakness on the Right  Motor: Arm left  Normal 5/5  Leg left  Normal 5/5  Arm right  Plegia 0/5  Leg right Paresis: 1/5  Cebellar: No evidence of appendicular or axial ataxia  Sensation: Merrill-hypoesthesia right    Laboratory:  CMP:   Recent Labs   Lab 07/26/19  0328   CALCIUM 8.3*   ALBUMIN 2.5*   PROT 5.6*      K 3.8   CO2 23      BUN 8   CREATININE 0.8   ALKPHOS 65   ALT 63*   AST 40   BILITOT 0.4     BMP:   Recent Labs   Lab 07/26/19 0328      K 3.8      CO2 23   BUN 8   CREATININE 0.8   CALCIUM 8.3*     CBC:   Recent Labs   Lab 07/26/19 0328   WBC 8.23   RBC 3.65*   HGB 11.6*   HCT 35.7*      MCV 98   MCH 31.8*   MCHC 32.5     Lipid Panel: No results for input(s): CHOL, LDLCALC, HDL, TRIG in the last 168 hours.  Coagulation:   Recent Labs   Lab 07/20/19  1201   INR 1.1     Platelet Aggregation Study: No results for input(s): PLTAGG, PLTAGINTERP, PLTAGREGLACO, ADPPLTAGGREG in the last 168 hours.  Hgb A1C: No results for input(s): HGBA1C in the last 168 hours.  TSH: No results for input(s): TSH in the last 168 hours.    Diagnostic Results     Brain Imaging   MRI brain 7/17  Multifocal moderate to large regions of acute/recent infarction primarily left MCA territory.  There is superimposed petechial regions of hemorrhage within the left basal ganglia, insula and frontal and parietal lobes.    Please note there is superimposed small region of additional recent infarction and petechial hemorrhage in the left occipital lobe with moderate-sized remote posterior temporal infarct.    Localized mass effect without midline shift or hydrocephalus.  Clinical correlation and follow-up advised.      Vessel Imaging   CTA head and neck 7/13  Complete  occlusion of the left M1 segment origin with some reconstitution of distal cortical branches through collateral flow.    Agree with [facility]hawk.    Angiogram 7/14  Cerebral angiography demonstrated left MCA origin occlusion.  This was treated with mechanical embolectomy using a aspiration technique, as above.    There was TICI 2C reperfusion.    Cardiac Imaging   ABDIAS 7/23  · Left atrial appendage with a well seated 21mm Watchman device. No peridevice leak on color doppler. No thrombus visualized.  · No intra-atrial shunt on bubble study with positive valsalva  · No Valvular thrombus, mass or vegation visualized  · Normal left ventricular systolic function. The estimated ejection fraction is 55%  · Normal right ventricular systolic function.  · Moderate size Left pleural effusion  ·   TTE 7/14  · Normal left ventricular systolic function. The estimated ejection fraction is 60%  · Normal LV diastolic function.  · Normal right ventricular systolic function.  · Mild tricuspid regurgitation.  · Intermediate central venous pressure (8 mm Hg).  · The estimated PA systolic pressure is 33 mm Hg

## 2019-07-26 NOTE — PLAN OF CARE
07/26/19 1127   Post-Acute Status   Post-Acute Authorization Placement   Post-Acute Placement Status Awaiting Internal Medical Clearance       Awaiting clearance. Once cleared and auth obtained patient will be going to [facility].  SW in contact with CM and Medical staff. Will continue to follow and offer support as needed.     Ayaan Flores LMSW  [facility]   Ext. [phone]

## 2019-07-26 NOTE — ASSESSMENT & PLAN NOTE
Patient has watch vargas device when he was in . His wife states that he received anticoagulation before the device placement with no reported side effects    Afib is a Stroke risk factor  · Rate control  CHADVASC score: 2  HAS BLED score: 2    TTE (07/14/2019):   Normal left ventricular systolic function. The estimated ejection fraction is 60%  · Normal LV diastolic function.  · Normal right ventricular systolic function.  · Mild tricuspid regurgitation.  · Intermediate central venous pressure (8 mm Hg).  · The estimated PA systolic pressure is 33 mm Hg    ABDIAS (7/23/2019):    Left atrial appendage with a well seated 21mm Watchman device. No peridevice leak on color doppler. No thrombus visualized.  · No intra-atrial shunt on bubble study with positive valsalva  · No Valvular thrombus, mass or vegation visualized  · Normal left ventricular systolic function. The estimated ejection fraction is 55%  · Normal right ventricular systolic function.  · Moderate size Left pleural effusion    Needs anticoagulation - Started on Eliquis 5 mg BID 7/26 - continue to monitor platelet count

## 2019-07-26 NOTE — ASSESSMENT & PLAN NOTE
Patient presents with normal PLTs count and dropped while hospitalization could be heparin related    92-->145-->181-->194    Continue to monitor platelet count daily while inpatient. CBC every 72 hrs X 3 when discharged to inpatient rehab.

## 2019-07-26 NOTE — PROGRESS NOTES
[facility]  Physical Medicine & Rehab  Progress Note    Patient Name: Justyn Rawls  MRN: [id number]  Admission Date: 7/14/2019  Length of Stay: 12 days  Attending Physician: Devon Dawkins MD    Subjective:     Principal Problem: Stroke due to embolism of left middle cerebral artery    Hospital Course:   7/23/19: Participated w/ PT. Bed mobility min- modA. [place] with scooting along the EOB. Grooming modA.   7/25/19: Participated w/ OT & PT. Bed mobility CGA- Nargis. Sit to stand modA. Bed to w/c transfer modA. Propelled w/c manually Nargis 75 ft. Grooming Nargis. UBD modA. LBD maxA.     Interval History 7/26/2019:  Patient is seen for follow-up rehab evaluation and recommendations: Participated w/ PT & OT yesterday. Anticoagulation to begin today.     HPI, Past Medical, Family, and Social History remains the same as documented in the initial encounter.    Scheduled Medications:    apixaban  5 mg Oral BID    FLUoxetine  20 mg Oral Daily    lisinopril  20 mg Oral Daily    senna-docusate 8.6-50 mg  1 tablet Oral BID     Diagnostic Results:   Labs: Reviewed    PRN Medications: acetaminophen, atropine, hydrALAZINE, ondansetron, sodium chloride 0.9%    Review of Systems   Constitutional: Positive for activity change. Negative for fatigue and fever.   HENT: Negative for trouble swallowing and voice change.    Respiratory: Negative for cough and shortness of breath.    Cardiovascular: Negative for chest pain and leg swelling.   Gastrointestinal: Negative for abdominal distention and abdominal pain.   Genitourinary: Negative for difficulty urinating and flank pain.   Musculoskeletal: Positive for gait problem. Negative for back pain.   Skin: Negative for color change and rash.   Neurological: Positive for speech difficulty and weakness.   Psychiatric/Behavioral: Negative for agitation, behavioral problems and confusion.     Objective:     Vital Signs (Most Recent):  Temp: 98.5 °F (36.9 °C) (07/25/19  2003)  Pulse: (!) 49 (07/26/19 0729)  Resp: 16 (07/26/19 0459)  BP: (!) 142/80 (07/26/19 0459)  SpO2: 97 % (07/26/19 0459)    Vital Signs (24h Range):  Temp:  [98 °F (36.7 °C)-98.5 °F (36.9 °C)] 98.5 °F (36.9 °C)  Pulse:  [49-84] 49  Resp:  [16-18] 16  SpO2:  [96 %-97 %] 97 %  BP: (120-142)/(74-80) 142/80     Physical Exam   Constitutional: He appears well-developed and well-nourished.   HENT:   Head: Normocephalic and atraumatic.   Eyes: Pupils are equal, round, and reactive to light. EOM are normal. Right eye exhibits no discharge. Left eye exhibits no discharge.   Neck: Neck supple.   Pulmonary/Chest: Effort normal. No respiratory distress.   Musculoskeletal: He exhibits no edema or deformity.   Neurological: No sensory deficit. He exhibits normal muscle tone.   - alert  - aphasia  - following commands   - RUE: 0/5.  LUE: 5/5.  RLE: 2/5.  LLE: 5/5.   Skin: Skin is warm and dry.   Psychiatric: He has a normal mood and affect. His behavior is normal.   Nursing note and vitals reviewed.    Assessment/Plan:      * Stroke due to embolism of left middle cerebral artery  - CT/CTA showed proximal L MCA occlusion.   - Received tPA.   - S/p thrombectomy w/ TICI2c flows post intervention.     See hospital course for functional, cognitive/speech/language, and nutrition/swallow status.      Recommendations  -  Encourage mobility, OOB in chair at least 3 hours per day, and early ambulation as appropriate   -  PT/OT evaluate and treat  -  SLP speech and cognitive evaluate and treat  -  Monitor sleep disturbances and establish consistent sleep-wake cycle  -  Monitor for bowel and bladder dysfunction  -  Monitor for shoulder pain and subluxation  -  Monitor for spasticity  -  Monitor for and prevent skin breakdown and pressure ulcers  · Early mobility, repositioning/weight shifting every 20-30 minutes when sitting, turn patient every 2 hours, proper mattress/overlay and chair cushioning, pressure relief/heel protector boots  -   DVT prophylaxis  -  Reviewed discharge options (IP rehab, SNF, HH therapy, and OP therapy)    Transaminitis  - LFTs trending down    Aphasia  - SLP eval and treat    Hemiparesis, right  - PT and OT eval and treat    PAF (paroxysmal atrial fibrillation)  - possible beginning anticoagulation 7/26 if platelet count normalizes      Continue to recommend inpatient rehab pending beginning anticoagulation.     Dinora Patterson NP  Department of Physical Medicine & Rehab   [facility]

## 2019-07-26 NOTE — PROGRESS NOTES
[facility]  Vascular Neurology  Comprehensive Stroke Center  Progress Note    Assessment/Plan:     * Stroke due to embolism of left middle cerebral artery  50 y/o male with L MCA stroke due to M1 occlusion received IV TPA and thrombectomy to TICI 2C, probable due to afib    Antithrombotics:  Eliquis 5 mg BID started 7/26    Statins: Lipitor 40 mg daily (currently on hold due to elevated AST/ALT)     Aggressive risk factor modification: A-Fib, alcohol     Rehab efforts: The patient has been evaluated by a stroke team provider and the therapy needs have been fully considered based off the presenting complaints and exam findings. The following therapy evaluations are needed: PT evaluate and treat, OT evaluate and treat, SLP evaluate and treat, PM&R evaluate for appropriate placement - recommending rehab     Diagnostics ordered/pending: NA    VTE prophylaxis: Eliquis 5 mg BID, mechanical SCDs    BP parameters: Infarct: Post sucessful thrombectomy, SBP <140        Transaminitis  US abdomen (7/23): No significant abnormality of the abdominal organs. Trace right pleural effusion.    Statin held and currently trending liver enzymes - trending down     Dysphagia  2/2 stroke   Aggressive SLP   Approved for regular thin     Hypertension  Stroke risk factor  SBP <140   On Lisinopril 20 mg - can titrate as needed     Thrombocytopenia  Patient presents with normal PLTs count and dropped while hospitalization could be heparin related    92-->145-->181-->194    Continue to monitor platelet count daily while inpatient. CBC every 72 hrs X 3 when discharged to inpatient rehab.     Cytotoxic brain edema  Large area of cytotoxic cerebral edema identified when reviewing brain imaging in the territory of the L middle cerebral artery. There is not mass effect associated with it. We will continue to monitor the patients clinical exam for any worsening of symptoms which may indicate expansion of the stroke or the area of  the edema resulting in the clinical change. The pattern is suggestive of cardioembolic etiology.        Aphasia  Due to stroke  Aggressive therapy    Hemiparesis, right  Due to stroke  Aggressive therapy    PAF (paroxysmal atrial fibrillation)  Patient has watch vargas device when he was in . His wife states that he received anticoagulation before the device placement with no reported side effects    Afib is a Stroke risk factor  · Rate control  CHADVASC score: 2  HAS BLED score: 2    TTE (07/14/2019):   Normal left ventricular systolic function. The estimated ejection fraction is 60%  · Normal LV diastolic function.  · Normal right ventricular systolic function.  · Mild tricuspid regurgitation.  · Intermediate central venous pressure (8 mm Hg).  · The estimated PA systolic pressure is 33 mm Hg    ABDIAS (7/23/2019):    Left atrial appendage with a well seated 21mm Watchman device. No peridevice leak on color doppler. No thrombus visualized.  · No intra-atrial shunt on bubble study with positive valsalva  · No Valvular thrombus, mass or vegation visualized  · Normal left ventricular systolic function. The estimated ejection fraction is 55%  · Normal right ventricular systolic function.  · Moderate size Left pleural effusion    Needs anticoagulation - Started on Eliquis 5 mg BID 7/26 - continue to monitor platelet count           7/14 admit to [facility] L MCA embolic stroke, s/p tpa, s/p thrombectomy L M1 multiple clots removed. ETOH use, monitor for withdrawals.  7/15 scheduled MRI wo contrast for tomorrow morning with planning for chemical DVT ppx. Continued 2% hypertonic saline and mannitol.   7/16 MRI wo contrast currently postponed, awaiting clarification that atrial device is safe to scan.  No acute events today. Continued 2% saline and mannitol.   7/17 MRI wo contrast preformed today, awaiting results. Plans to start anticoagulation following MRI per stroke team.   7/18 Cardiology consulted today for recs on  whether to start AC or preform a ABDIAS to check potential thrombi on watchman.   7/19 Platelet count decreased below 100 while Heparin. D/c heparin and hold on any AC.  Gave lasix IV 20 to help with fluid overload. D/c 2%HTS  7/20 Platelet count still below 100.  HIT antibody panel pending.  Hold on ABDIAS until thrombocytopenia resolved.   7/21: Developing HA. Repeated CTH with no significant interval changes   7/22: ABDIAS procedure tomorrow 7/23, NPO at midnight   7/23: ABDIAS complete, no thrombus seen on exam, 81 mg started, transfer to stroke pending   7/25 - Platelet count trending up - continue to watch trend and start AC potentially tomorrow. AST/ALT trending down.   7/16 - Platelet count 194 today. Started Eliquis 5 mg BID - discussed w/ wife. Continue to monitor daily CBC while inpatient and every 72 hours when discharge to rehab.     STROKE DOCUMENTATION   Acute Stroke Times   Last Known Normal Date: 07/13/19  Last Known Normal Time: 2130  Symptom Onset Date: 07/13/19  Symptom Onset Time: 2130  Stroke Team Called Date: 07/13/19  Stroke Team Called Time: 2245  Stroke Team Arrival Date: 07/14/19  Stroke Team Arrival Time: 0107  CT Interpretation Time: 2251  Decision to Treat Time for Alteplase: 2307(bolus given)  Decision to Treat Time for IR: 0107    NIH Scale:  1a. Level of Consciousness: 0-->Alert, keenly responsive  1b. LOC Questions: 2-->Answers neither question correctly  1c. LOC Commands: 0-->Performs both tasks correctly  2. Best Gaze: 0-->Normal  3. Visual: 1-->Partial hemianopia  4. Facial Palsy: 2-->Partial paralysis (total or near-total paralysis of lower face)  5a. Motor Arm, Left: 0-->No drift, limb holds 90 (or 45) degrees for full 10 secs  5b. Motor Arm, Right: 4-->No movement  6a. Motor Leg, Left: 0-->No drift, leg holds 30 degree position for full 5 secs  6b. Motor Leg, Right: 3-->No effort against gravity, leg falls to bed immediately  7. Limb Ataxia: 0-->Absent  8. Sensory: 1-->Mild-to-moderate  sensory loss, patient feels pinprick is less sharp or is dull on the affected side, or there is a loss of superficial pain with pinprick, but patient is aware of being touched  9. Best Language: 1-->Mild-to-moderate aphasia, some obvious loss of fluency or facility of comprehension, without significant limitation on ideas expressed or form of expression. Reduction of speech and/or comprehension, however, makes conversation. . . (see row details)  10. Dysarthria: 1-->Mild-to-moderate dysarthria, patient slurs at least some words and, at worst, can be understood with some difficulty  11. Extinction and Inattention (formerly Neglect): 0-->No abnormality  Total (NIH Stroke Scale): 15       Modified [place] Score: 0  [place] Coma Scale:    ABCD2 Score:    [id number] Score:2  HAS -BLED Score:2  ICH Score:   Hunt & Ta Classification:      Hemorrhagic change of an Ischemic Stroke: Does this patient have an ischemic stroke with hemorrhagic changes? No     Neurologic Chief Complaint: L MCA    Subjective:     Interval History: Patient is seen for follow-up neurological assessment and treatment recommendations:     Platelet count 194 today. Started Eliquis 5 mg BID - discussed w/ wife. Continue to monitor daily CBC while inpatient and every 72 hours when discharge to rehab.     HPI, Past Medical, Family, and Social History remains the same as documented in the initial encounter.     Review of Systems   Constitutional: Negative for fever.   Respiratory: Negative for shortness of breath.    Cardiovascular: Negative for chest pain.   Gastrointestinal: Negative for nausea and vomiting.   Musculoskeletal: Positive for gait problem.   Neurological: Positive for facial asymmetry, speech difficulty and weakness.   Psychiatric/Behavioral: Negative for agitation.     Scheduled Meds:   apixaban  5 mg Oral BID    FLUoxetine  20 mg Oral Daily    lisinopril  20 mg Oral Daily    senna-docusate 8.6-50 mg  1 tablet Oral BID      Continuous Infusions:  PRN Meds:acetaminophen, atropine, hydrALAZINE, ondansetron, sodium chloride 0.9%    Objective:     Vital Signs (Most Recent):  Temp: 98.5 °F (36.9 °C) (07/25/19 2003)  Pulse: (!) 49 (07/26/19 0729)  Resp: 16 (07/26/19 0459)  BP: (!) 142/80 (07/26/19 0459)  SpO2: 97 % (07/26/19 0459)  BP Location: Left arm    Vital Signs Range (Last 24H):  Temp:  [98 °F (36.7 °C)-98.5 °F (36.9 °C)]   Pulse:  [49-84]   Resp:  [16-18]   BP: (120-142)/(74-80)   SpO2:  [96 %-97 %]   BP Location: Left arm    Physical Exam   Constitutional: He appears well-developed.   HENT:   Head: Normocephalic and atraumatic.   Cardiovascular: Normal rate.   Pulmonary/Chest: Effort normal.   Musculoskeletal:   Right sided weakness   Neurological: He is alert.   Not oriented to place or month   Skin: Skin is warm. Capillary refill takes less than 2 seconds.   Psychiatric: He has a normal mood and affect.   Vitals reviewed.      Neurological Exam:   LOC: alert  Attention Span: Good   Language: Expressive aphasia  Articulation: Dysarthria  Orientation: Not oriented to place, Not oriented to time  Visual Fields: Full  EOM (CN III, IV, VI): Full/intact  Pupils (CN II, III): PERRL  Facial Movement (CN VII): Lower facial weakness on the Right  Motor: Arm left  Normal 5/5  Leg left  Normal 5/5  Arm right  Plegia 0/5  Leg right Paresis: 1/5  Cebellar: No evidence of appendicular or axial ataxia  Sensation: Merrill-hypoesthesia right    Laboratory:  CMP:   Recent Labs   Lab 07/26/19 0328   CALCIUM 8.3*   ALBUMIN 2.5*   PROT 5.6*      K 3.8   CO2 23      BUN 8   CREATININE 0.8   ALKPHOS 65   ALT 63*   AST 40   BILITOT 0.4     BMP:   Recent Labs   Lab 07/26/19 0328      K 3.8      CO2 23   BUN 8   CREATININE 0.8   CALCIUM 8.3*     CBC:   Recent Labs   Lab 07/26/19  0328   WBC 8.23   RBC 3.65*   HGB 11.6*   HCT 35.7*      MCV 98   MCH 31.8*   MCHC 32.5     Lipid Panel: No results for input(s): CHOL, LDLCALC,  HDL, TRIG in the last 168 hours.  Coagulation:   Recent Labs   Lab 07/20/19  1201   INR 1.1     Platelet Aggregation Study: No results for input(s): PLTAGG, PLTAGINTERP, PLTAGREGLACO, ADPPLTAGGREG in the last 168 hours.  Hgb A1C: No results for input(s): HGBA1C in the last 168 hours.  TSH: No results for input(s): TSH in the last 168 hours.    Diagnostic Results     Brain Imaging   MRI brain 7/17  Multifocal moderate to large regions of acute/recent infarction primarily left MCA territory.  There is superimposed petechial regions of hemorrhage within the left basal ganglia, insula and frontal and parietal lobes.    Please note there is superimposed small region of additional recent infarction and petechial hemorrhage in the left occipital lobe with moderate-sized remote posterior temporal infarct.    Localized mass effect without midline shift or hydrocephalus.  Clinical correlation and follow-up advised.      Vessel Imaging   CTA head and neck 7/13  Complete occlusion of the left M1 segment origin with some reconstitution of distal cortical branches through collateral flow.    Agree with princessk.    Angiogram 7/14  Cerebral angiography demonstrated left MCA origin occlusion.  This was treated with mechanical embolectomy using a aspiration technique, as above.    There was TICI 2C reperfusion.    Cardiac Imaging   ABDIAS 7/23  · Left atrial appendage with a well seated 21mm Watchman device. No peridevice leak on color doppler. No thrombus visualized.  · No intra-atrial shunt on bubble study with positive valsalva  · No Valvular thrombus, mass or vegation visualized  · Normal left ventricular systolic function. The estimated ejection fraction is 55%  · Normal right ventricular systolic function.  · Moderate size Left pleural effusion  ·   TTE 7/14  · Normal left ventricular systolic function. The estimated ejection fraction is 60%  · Normal LV diastolic function.  · Normal right ventricular systolic function.  · Mild  tricuspid regurgitation.  · Intermediate central venous pressure (8 mm Hg).  · The estimated PA systolic pressure is 33 mm Hg      Lianne Conti NP  [facility]  Department of Vascular Neurology   [facility]

## 2019-07-26 NOTE — PLAN OF CARE
07/26/19 0745   Discharge Reassessment   Assessment Type Discharge Planning Reassessment   Provided patient/caregiver education on the expected discharge date and the discharge plan Yes   Do you have any problems affording any of your prescribed medications? No   Discharge Plan A Rehab   Discharge Plan B Skilled Nursing Facility

## 2019-07-26 NOTE — SUBJECTIVE & OBJECTIVE
Interval History 7/26/2019:  Patient is seen for follow-up rehab evaluation and recommendations: Participated w/ PT & OT yesterday. Anticoagulation to begin today.     HPI, Past Medical, Family, and Social History remains the same as documented in the initial encounter.    Scheduled Medications:    apixaban  5 mg Oral BID    FLUoxetine  20 mg Oral Daily    lisinopril  20 mg Oral Daily    senna-docusate 8.6-50 mg  1 tablet Oral BID     Diagnostic Results:   Labs: Reviewed    PRN Medications: acetaminophen, atropine, hydrALAZINE, ondansetron, sodium chloride 0.9%    Review of Systems   Constitutional: Positive for activity change. Negative for fatigue and fever.   HENT: Negative for trouble swallowing and voice change.    Respiratory: Negative for cough and shortness of breath.    Cardiovascular: Negative for chest pain and leg swelling.   Gastrointestinal: Negative for abdominal distention and abdominal pain.   Genitourinary: Negative for difficulty urinating and flank pain.   Musculoskeletal: Positive for gait problem. Negative for back pain.   Skin: Negative for color change and rash.   Neurological: Positive for speech difficulty and weakness.   Psychiatric/Behavioral: Negative for agitation, behavioral problems and confusion.     Objective:     Vital Signs (Most Recent):  Temp: 98.5 °F (36.9 °C) (07/25/19 2003)  Pulse: (!) 49 (07/26/19 0729)  Resp: 16 (07/26/19 0459)  BP: (!) 142/80 (07/26/19 0459)  SpO2: 97 % (07/26/19 0459)    Vital Signs (24h Range):  Temp:  [98 °F (36.7 °C)-98.5 °F (36.9 °C)] 98.5 °F (36.9 °C)  Pulse:  [49-84] 49  Resp:  [16-18] 16  SpO2:  [96 %-97 %] 97 %  BP: (120-142)/(74-80) 142/80     Physical Exam   Constitutional: He appears well-developed and well-nourished.   HENT:   Head: Normocephalic and atraumatic.   Eyes: Pupils are equal, round, and reactive to light. EOM are normal. Right eye exhibits no discharge. Left eye exhibits no discharge.   Neck: Neck supple.   Pulmonary/Chest:  Effort normal. No respiratory distress.   Musculoskeletal: He exhibits no edema or deformity.   Neurological: No sensory deficit. He exhibits normal muscle tone.   - alert  - aphasia  - following commands   - RUE: 0/5.  LUE: 5/5.  RLE: 2/5.  LLE: 5/5.   Skin: Skin is warm and dry.   Psychiatric: He has a normal mood and affect. His behavior is normal.   Nursing note and vitals reviewed.    NEUROLOGICAL EXAMINATION:     CRANIAL NERVES     CN III, IV, VI   Pupils are equal, round, and reactive to light.  Extraocular motions are normal.

## 2019-07-26 NOTE — PLAN OF CARE
ROXANNE spoke to Juan at [facility]-he states he contacted [employer] in reference to auth-they notified him that request has been assigned to a nurse today to review.      07/26/19 1407   Post-Acute Status   Post-Acute Authorization Placement   Post-Acute Placement Status Pending Payor Review

## 2019-07-27 PROBLEM — R33.9 URINARY RETENTION: Status: ACTIVE | Noted: 2019-07-27

## 2019-07-27 LAB
BASOPHILS # BLD AUTO: 0.05 K/UL (ref 0–0.2)
BASOPHILS NFR BLD: 0.7 % (ref 0–1.9)
DIFFERENTIAL METHOD: ABNORMAL
EOSINOPHIL # BLD AUTO: 0.3 K/UL (ref 0–0.5)
EOSINOPHIL NFR BLD: 4.1 % (ref 0–8)
ERYTHROCYTE [DISTWIDTH] IN BLOOD BY AUTOMATED COUNT: 12.7 % (ref 11.5–14.5)
HCT VFR BLD AUTO: 37.9 % (ref 40–54)
HGB BLD-MCNC: 12.6 G/DL (ref 14–18)
IMM GRANULOCYTES # BLD AUTO: 0.11 K/UL (ref 0–0.04)
IMM GRANULOCYTES NFR BLD AUTO: 1.6 % (ref 0–0.5)
LYMPHOCYTES # BLD AUTO: 0.7 K/UL (ref 1–4.8)
LYMPHOCYTES NFR BLD: 10.5 % (ref 18–48)
MCH RBC QN AUTO: 31.8 PG (ref 27–31)
MCHC RBC AUTO-ENTMCNC: 33.2 G/DL (ref 32–36)
MCV RBC AUTO: 96 FL (ref 82–98)
MONOCYTES # BLD AUTO: 0.5 K/UL (ref 0.3–1)
MONOCYTES NFR BLD: 7.1 % (ref 4–15)
NEUTROPHILS # BLD AUTO: 5.4 K/UL (ref 1.8–7.7)
NEUTROPHILS NFR BLD: 76 % (ref 38–73)
NRBC BLD-RTO: 0 /100 WBC
PLATELET # BLD AUTO: 215 K/UL (ref 150–350)
PMV BLD AUTO: 10.2 FL (ref 9.2–12.9)
RBC # BLD AUTO: 3.96 M/UL (ref 4.6–6.2)
WBC # BLD AUTO: 7.08 K/UL (ref 3.9–12.7)

## 2019-07-27 PROCEDURE — 25000003 PHARM REV CODE 250: Performed by: NURSE PRACTITIONER

## 2019-07-27 PROCEDURE — 97112 NEUROMUSCULAR REEDUCATION: CPT

## 2019-07-27 PROCEDURE — 97535 SELF CARE MNGMENT TRAINING: CPT

## 2019-07-27 PROCEDURE — 20600001 HC STEP DOWN PRIVATE ROOM

## 2019-07-27 PROCEDURE — 36415 COLL VENOUS BLD VENIPUNCTURE: CPT

## 2019-07-27 PROCEDURE — 97530 THERAPEUTIC ACTIVITIES: CPT

## 2019-07-27 PROCEDURE — 99232 SBSQ HOSP IP/OBS MODERATE 35: CPT | Mod: ,,, | Performed by: PSYCHIATRY & NEUROLOGY

## 2019-07-27 PROCEDURE — 99232 PR SUBSEQUENT HOSPITAL CARE,LEVL II: ICD-10-PCS | Mod: ,,, | Performed by: PSYCHIATRY & NEUROLOGY

## 2019-07-27 PROCEDURE — 85025 COMPLETE CBC W/AUTO DIFF WBC: CPT

## 2019-07-27 RX ADMIN — APIXABAN 5 MG: 5 TABLET, FILM COATED ORAL at 09:07

## 2019-07-27 RX ADMIN — APIXABAN 5 MG: 5 TABLET, FILM COATED ORAL at 10:07

## 2019-07-27 RX ADMIN — LISINOPRIL 20 MG: 20 TABLET ORAL at 09:07

## 2019-07-27 RX ADMIN — FLUOXETINE 20 MG: 20 CAPSULE ORAL at 09:07

## 2019-07-27 NOTE — PT/OT/SLP PROGRESS
Occupational Therapy   Treatment    Name: Justyn Rawls  MRN: [id number]  Admitting Diagnosis:  Stroke due to embolism of left middle cerebral artery  4 Days Post-Op    Recommendations:     Discharge Recommendations: rehabilitation facility  Discharge Equipment Recommendations:  tub bench, wheelchair, manual  Barriers to discharge:  Inaccessible home environment, Decreased caregiver support    Assessment:     Justyn Rawls is a 49 y.o. male with a medical diagnosis of Stroke due to embolism of left middle cerebral artery.  He presents with performance deficits affecting function are weakness, impaired endurance, impaired sensation, impaired self care skills, impaired functional mobilty, gait instability, impaired balance, impaired cognition, decreased coordination, decreased upper extremity function, decreased lower extremity function, decreased safety awareness, abnormal tone, decreased ROM, impaired coordination, impaired fine motor.     Rehab Prognosis:  Good; patient would benefit from acute skilled OT services to address these deficits and reach maximum level of function.       Plan:     Patient to be seen 4 x/week to address the above listed problems via self-care/home management, therapeutic exercises, cognitive retraining, neuromuscular re-education, sensory integration  · Plan of Care Expires: 08/13/19  · Plan of Care Reviewed with: patient    Subjective   Patient:  Attempting to communicate throughout the session.  Pain/Comfort:  · Pain Rating 1: 0/10  · Pain Rating Post-Intervention 1: 0/10    Objective:     Communicated with: Nurse prior to session.  Patient found supine with peripheral IV, telemetry upon OT entry to room.  Family not present.    General Precautions: Standard, fall, aspiration, aphasia   Orthopedic Precautions:N/A   Braces: N/A     Occupational Performance:     Bed Mobility:    · Patient completed Rolling/Turning to Left with  minimum assistance  · Patient completed Supine to Sit with  contact guard assistance  · Patient completed Sit to Supine with contact guard assistance     Functional Mobility/Transfers:  · Patient completed Sit <> Stand Transfer with minimum assistance  with  no assistive device   · Patient completed Bed <> Chair Transfer using Stand Pivot technique with moderate assistance with no assistive device    Activities of Daily Living:  · Grooming: moderate assistance while standing with right UE in weight bearing  · Upper Body Dressing: moderate assistance while seated EOB  · Lower Body Dressing: maximal assistance while seated EOB    [facility] 6 Click ADL: 13    Treatment & Education:  Patient education provided on role of OT and need for rehab upon discharge.  Patient education provided on hemiplegic dressing technique, right UE weight bearing / positioning, postural control, and transfers.  Continued education, patient/ family training recommended. Patient alert; able to follow 3/4 one step commands.  Daily orientation provided. PROM performed right UE one set x 10 rep in all planes of motion with stretches provided at end range; sustained stretch provided for external rotation.  Assistance and facilitation provided for upward rotation of the scapula during shoulder flexion and abduction. Addressed right UE weight bearing while seated EOB.      Positioning provided for midline orientation with bilateral UEs elevated and heels lifted off mattress.   White board updated in patient's room.       Patient left supine with all lines intact, call button in reach and bed alarm onEducation:      GOALS:   Multidisciplinary Problems     Occupational Therapy Goals        Problem: Occupational Therapy Goal    Goal Priority Disciplines Outcome Interventions   Occupational Therapy Goal     OT, PT/OT     Description:  Goals set 7/25 to be addressed in 14 days, expiring 8/8:   Patient will increase functional independence with ADLs by performing:    UE Dressing with Minimal Assistance with  hemiplegic technique.  LE Dressing with Minimal Assistance.  Grooming while standing with Contact Guard Assistance.   Toileting from bedside commode with Minimal Assistance for hygiene and clothing management.   Rolling to Left with Contact Guard Assistance.   Rolling to Right with Supervision.   Stand pivot transfers with Contact Guard Assistance.  Patient/Caregivers will be independent in assisting in bed mobility/positioning  Patient/Caregivers will be independent in HEP for weightbearing with RUE, PROM of RUE.                         Time Tracking:     OT Date of Treatment: 07/27/19  OT Start Time: 0820  OT Stop Time: 0847  OT Total Time (min): 27 min    Billable Minutes:Self Care/Home Management 14  Neuromuscular Re-education 13    JONNY Rosario  7/27/2019

## 2019-07-27 NOTE — PLAN OF CARE
Problem: Occupational Therapy Goal  Goal: Occupational Therapy Goal  Goals set 7/25 to be addressed in 14 days, expiring 8/8:   Patient will increase functional independence with ADLs by performing:    UE Dressing with Minimal Assistance with hemiplegic technique.  LE Dressing with Minimal Assistance.  Grooming while standing with Contact Guard Assistance.   Toileting from bedside commode with Minimal Assistance for hygiene and clothing management.   Rolling to Left with Contact Guard Assistance.   Rolling to Right with Supervision.   Stand pivot transfers with Contact Guard Assistance.  Patient/Caregivers will be independent in assisting in bed mobility/positioning  Patient/Caregivers will be independent in HEP for weightbearing with RUE, PROM of RUE.        Goals remain appropriate.  JONNY Rosario  7/27/2019

## 2019-07-27 NOTE — PT/OT/SLP PROGRESS
"Physical Therapy Treatment    Patient Name:  Justyn Rawls   MRN:  [id number]    Recommendations:     Discharge Recommendations:  rehabilitation facility   Discharge Equipment Recommendations: (TBD)   Barriers to discharge: increased assistance needed    Assessment:     Justyn Rawls is a 49 y.o. male admitted with a medical diagnosis of Stroke due to embolism of left middle cerebral artery.  He presents with the following impairments/functional limitations:  weakness, impaired endurance, impaired sensation, impaired self care skills, impaired functional mobilty, gait instability, impaired balance, impaired cognition, decreased coordination, decreased upper extremity function, decreased lower extremity function, decreased safety awareness, abnormal tone, decreased ROM, impaired coordination, impaired fine motor. Pt tolerated session well with focus on bed mobility, transfers, and standing/sitting balance. Pt progressing well and is noted to have trace contractions at hip flexors, quadriceps, and hamstrings when attempting purposeful movements at bedside. Pt will continue to benefit from therapy services to address impairments listed above.     Rehab Prognosis: Good; patient would benefit from acute skilled PT services to address these deficits and reach maximum level of function.    Recent Surgery: Procedure(s) (LRB):  ECHOCARDIOGRAM, TRANSESOPHAGEAL (N/A) 4 Days Post-Op    Plan:     During this hospitalization, patient to be seen 4 x/week to address the identified rehab impairments via gait training, therapeutic activities, therapeutic exercises, neuromuscular re-education, wheelchair management/training and progress toward the following goals:    · Plan of Care Expires:  08/13/19    Subjective     Chief Complaint: no c/o  Patient/Family Comments/goals: "I feel fine."  Pain/Comfort:  · Pain Rating 1: 0/10  · Pain Rating Post-Intervention 1: 0/10      Objective:     Communicated with NSG prior to session.  Patient " found supine with peripheral IV, telemetry upon PTA entry to room.     General Precautions: Standard, fall, aspiration, aphasia   Orthopedic Precautions:N/A   Braces: N/A     Functional Mobility:  · Bed Mobility:     · Supine to Sit: stand by assistance  · Sit to Supine: contact guard assistance  · Transfers:     · Sit to Stand:  moderate assistance with hand-held assist  · Balance: Pt sits at EOB with supervision for static sitting balance. Pt stands with Mod A to maintain balance.       AM-PAC 6 CLICK MOBILITY  Turning over in bed (including adjusting bedclothes, sheets and blankets)?: 3  Sitting down on and standing up from a chair with arms (e.g., wheelchair, bedside commode, etc.): 2  Moving from lying on back to sitting on the side of the bed?: 3  Moving to and from a bed to a chair (including a wheelchair)?: 2  Need to walk in hospital room?: 2  Climbing 3-5 steps with a railing?: 1  Basic Mobility Total Score: 13       Therapeutic Activities and Exercises:   Pt assisted with functional mobility as noted above.   Pt stands x 2 attempts with no AD and assistance to block RLE and support RUE from hanging dependently. Pt requires assistance to maintain hip extension and R knee extension. Pt stands for ~ 2-4 minutes on each trial with Mod A.     Patient left supine with all lines intact, call button in reach, bed alarm on and spouse present.    GOALS:   Multidisciplinary Problems     Physical Therapy Goals        Problem: Physical Therapy Goal    Goal Priority Disciplines Outcome Goal Variances Interventions   Physical Therapy Goal     PT, PT/OT Ongoing (interventions implemented as appropriate)     Description:  PT goals until 8/2/19    1. Pt supine to sit with minimal assist- MET 7/19      Updated: Pt will perform supine to sit with supervision   2. Pt sit to supine with minimal assist-not met  3. Pt sit to stand with LRAD with minimal assist-not met  4. Pt to perform gait 20ft with LRAD with max assist.-not  met  5. Pt to go up/down curb step with LRAD with max assist.-not met  6. Pt to transfer bed to/from bedside chair with moderate assist.-MET 7/25     Updated: Pt will perform bed to chair transfer with contact guard assistance   7. Pt to perform B LE exs in sitting or supine x 20 reps to strengthen B LE to improve functional mobility.-not met  8. Pt will propel wheelchair x 100 feet with minimal assistance                           Time Tracking:     PT Received On: 07/27/19  PT Start Time: 0911     PT Stop Time: 0935  PT Total Time (min): 24 min     Billable Minutes: Therapeutic Activity 14 and Neuromuscular Re-education 10    Treatment Type: Treatment  PT/PTA: PTA     PTA Visit Number: 1     Padilla Ledezma, LYNSEY  07/27/2019

## 2019-07-27 NOTE — NURSING TRANSFER
Nursing Transfer Note      7/27/2019     Transfer to US    Transfer via stretcher    Transfer with telemetry    Transported by escort    Medicines sent: none    Chart send with patient: no    Notified: family    Patient reassessed at: transfer

## 2019-07-27 NOTE — ASSESSMENT & PLAN NOTE
Stroke risk factor  SBP <140   On Lisinopril 20 mg - can titrate as needed   BP at goal within last 24 hours

## 2019-07-27 NOTE — ASSESSMENT & PLAN NOTE
Reported urine retention and flank pain per pt and family overnight 7/26.  Currently voiding spontaneously w/o difficulty.   Ordered renal ultrasound.

## 2019-07-27 NOTE — PLAN OF CARE
Problem: Adult Inpatient Plan of Care  Goal: Plan of Care Review  Outcome: Ongoing (interventions implemented as appropriate)  Pt is awake and alert this shift. VSS. Pt aphasic but able to answer yes/no questions and nod appropriately. No falls/injury as family members calls for assistance when needed. Fall precautions remain in place, bed alarm set. No s/s of skin breakdown noted. Pt able to turn himself with minimal assist. Protective foams to bilateral heels. Sacrum inspected with no breakdown noted. No c/o pain this shift. Cardiac monitor remains on. Bed in lowest position. Call light within reach. Will continue to monitor.

## 2019-07-27 NOTE — ASSESSMENT & PLAN NOTE
Patient presents with normal PLTs count and dropped while hospitalization could be heparin related    92-->145-->181-->194-->215    Continue to monitor platelet count daily while inpatient. CBC every 72 hrs X 3 when discharged to inpatient rehab.

## 2019-07-27 NOTE — PROGRESS NOTES
[facility]  Vascular Neurology  Comprehensive Stroke Center  Progress Note    Assessment/Plan:     * Stroke due to embolism of left middle cerebral artery  48 y/o male with L MCA stroke due to M1 occlusion received IV TPA and thrombectomy to TICI 2C, probable due to afib    Antithrombotics:  Eliquis 5 mg BID started 7/26    Statins: Lipitor 40 mg daily (currently on hold due to elevated AST/ALT)     Aggressive risk factor modification: A-Fib, alcohol     Rehab efforts: The patient has been evaluated by a stroke team provider and the therapy needs have been fully considered based off the presenting complaints and exam findings. The following therapy evaluations are needed: PT evaluate and treat, OT evaluate and treat, SLP evaluate and treat, PM&R evaluate for appropriate placement - recommending rehab     Diagnostics ordered/pending: NA    VTE prophylaxis: Eliquis 5 mg BID, mechanical SCDs    BP parameters: Infarct: Post sucessful thrombectomy, SBP <140        Urinary retention  Reported urine retention and flank pain per pt and family overnight 7/26.  Currently voiding spontaneously w/o difficulty.   Ordered renal ultrasound.    Transaminitis  US abdomen (7/23): No significant abnormality of the abdominal organs. Trace right pleural effusion.    Statin held and currently trending liver enzymes - trending down     Dysphagia  2/2 stroke   Aggressive SLP   Approved for regular thin     Hypertension  Stroke risk factor  SBP <140   On Lisinopril 20 mg - can titrate as needed   BP at goal within last 24 hours    Thrombocytopenia  Patient presents with normal PLTs count and dropped while hospitalization could be heparin related    92-->145-->181-->194-->215    Continue to monitor platelet count daily while inpatient. CBC every 72 hrs X 3 when discharged to inpatient rehab.     Cytotoxic brain edema  Large area of cytotoxic cerebral edema identified when reviewing brain imaging in the territory of the L  middle cerebral artery. There is not mass effect associated with it. We will continue to monitor the patients clinical exam for any worsening of symptoms which may indicate expansion of the stroke or the area of the edema resulting in the clinical change. The pattern is suggestive of cardioembolic etiology.        Aphasia  Due to stroke  Aggressive therapy    Hemiparesis, right  Due to stroke  Aggressive therapy    PAF (paroxysmal atrial fibrillation)  Patient has watch vargas device when he was in . His wife states that he received anticoagulation before the device placement with no reported side effects    Afib is a Stroke risk factor  · Rate control  CHADVASC score: 2  HAS BLED score: 2    TTE (07/14/2019):   Normal left ventricular systolic function. The estimated ejection fraction is 60%  · Normal LV diastolic function.  · Normal right ventricular systolic function.  · Mild tricuspid regurgitation.  · Intermediate central venous pressure (8 mm Hg).  · The estimated PA systolic pressure is 33 mm Hg    ABDIAS (7/23/2019):    Left atrial appendage with a well seated 21mm Watchman device. No peridevice leak on color doppler. No thrombus visualized.  · No intra-atrial shunt on bubble study with positive valsalva  · No Valvular thrombus, mass or vegation visualized  · Normal left ventricular systolic function. The estimated ejection fraction is 55%  · Normal right ventricular systolic function.  · Moderate size Left pleural effusion    Needs anticoagulation - Started on Eliquis 5 mg BID 7/26 - continue to monitor platelet count           7/14 admit to [facility] L MCA embolic stroke, s/p tpa, s/p thrombectomy L M1 multiple clots removed. ETOH use, monitor for withdrawals.  7/15 scheduled MRI wo contrast for tomorrow morning with planning for chemical DVT ppx. Continued 2% hypertonic saline and mannitol.   7/16 MRI wo contrast currently postponed, awaiting clarification that atrial device is safe to scan.  No acute  events today. Continued 2% saline and mannitol.   7/17 MRI wo contrast preformed today, awaiting results. Plans to start anticoagulation following MRI per stroke team.   7/18 Cardiology consulted today for recs on whether to start AC or preform a ABDIAS to check potential thrombi on watchman.   7/19 Platelet count decreased below 100 while Heparin. D/c heparin and hold on any AC.  Gave lasix IV 20 to help with fluid overload. D/c 2%HTS  7/20 Platelet count still below 100.  HIT antibody panel pending.  Hold on ABDIAS until thrombocytopenia resolved.   7/21: Developing HA. Repeated CTH with no significant interval changes   7/22: ABDIAS procedure tomorrow 7/23, NPO at midnight   7/23: ABDIAS complete, no thrombus seen on exam, 81 mg started, transfer to stroke pending   7/25 - Platelet count trending up - continue to watch trend and start AC potentially tomorrow. AST/ALT trending down.   7/16 - Platelet count 194 today. Started Eliquis 5 mg BID - discussed w/ wife. Continue to monitor daily CBC while inpatient and every 72 hours when discharge to rehab.   7/17 - Platelet count 215 today. Urinary retention overnight, currently voiding spontaneously, c/o mild flank pain, ordered renal US.    STROKE DOCUMENTATION   Acute Stroke Times   Last Known Normal Date: 07/13/19  Last Known Normal Time: 2130  Symptom Onset Date: 07/13/19  Symptom Onset Time: 2130  Stroke Team Called Date: 07/13/19  Stroke Team Called Time: 2245  Stroke Team Arrival Date: 07/14/19  Stroke Team Arrival Time: 0107  CT Interpretation Time: 2251  Decision to Treat Time for Alteplase: 2307(bolus given)  Decision to Treat Time for IR: 0107    NIH Scale:  1a. Level of Consciousness: 0-->Alert, keenly responsive  1b. LOC Questions: 1-->Answers one question correctly  1c. LOC Commands: 1-->Performs one task correctly(some difficulty following 2 step commands)  2. Best Gaze: 0-->Normal  3. Visual: 1-->Partial hemianopia  4. Facial Palsy: 2-->Partial paralysis (total or  near-total paralysis of lower face)  5a. Motor Arm, Left: 0-->No drift, limb holds 90 (or 45) degrees for full 10 secs  5b. Motor Arm, Right: 4-->No movement  6a. Motor Leg, Left: 0-->No drift, leg holds 30 degree position for full 5 secs  6b. Motor Leg, Right: 3-->No effort against gravity, leg falls to bed immediately  7. Limb Ataxia: 0-->Absent  8. Sensory: 1-->Mild-to-moderate sensory loss, patient feels pinprick is less sharp or is dull on the affected side, or there is a loss of superficial pain with pinprick, but patient is aware of being touched  9. Best Language: 1-->Mild-to-moderate aphasia, some obvious loss of fluency or facility of comprehension, without significant limitation on ideas expressed or form of expression. Reduction of speech and/or comprehension, however, makes conversation. . . (see row details)  10. Dysarthria: 1-->Mild-to-moderate dysarthria, patient slurs at least some words and, at worst, can be understood with some difficulty  11. Extinction and Inattention (formerly Neglect): 0-->No abnormality  Total (NIH Stroke Scale): 15       Modified [place] Score: 0  [place] Coma Scale:    ABCD2 Score:    [id number] Score:2  HAS -BLED Score:2  ICH Score:   Hunt & Ta Classification:      Hemorrhagic change of an Ischemic Stroke: Does this patient have an ischemic stroke with hemorrhagic changes? No     Neurologic Chief Complaint: L MCA    Subjective:     Interval History: Patient is seen for follow-up neurological assessment and treatment recommendations:   Platelet count 215 today. Urinary retention overnight, currently voiding spontaneously, c/o mild flank pain, ordered renal US.    HPI, Past Medical, Family, and Social History remains the same as documented in the initial encounter.     Review of Systems   Constitutional: Negative for fever.   Respiratory: Negative for shortness of breath.    Cardiovascular: Negative for chest pain.   Gastrointestinal: Negative for nausea and vomiting.    Genitourinary: Positive for difficulty urinating (overnight) and flank pain.   Musculoskeletal: Positive for gait problem.   Neurological: Positive for facial asymmetry, speech difficulty and weakness.   Psychiatric/Behavioral: Negative for agitation.     Scheduled Meds:   apixaban  5 mg Oral BID    FLUoxetine  20 mg Oral Daily    lisinopril  20 mg Oral Daily    senna-docusate 8.6-50 mg  1 tablet Oral BID     Continuous Infusions:  PRN Meds:acetaminophen, atropine, hydrALAZINE, ondansetron, sodium chloride 0.9%    Objective:     Vital Signs (Most Recent):  Temp: 97.1 °F (36.2 °C) (07/27/19 1548)  Pulse: 61 (07/27/19 1548)  Resp: 18 (07/27/19 1548)  BP: 112/76 (07/27/19 1548)  SpO2: (!) 93 % (07/27/19 1548)  BP Location: Left arm    Vital Signs Range (Last 24H):  Temp:  [97.1 °F (36.2 °C)-99.1 °F (37.3 °C)]   Pulse:  [56-78]   Resp:  [16-18]   BP: (106-134)/(65-83)   SpO2:  [92 %-97 %]   BP Location: Left arm    Physical Exam   Constitutional: He appears well-developed.   HENT:   Head: Normocephalic and atraumatic.   Cardiovascular: Normal rate.   Pulmonary/Chest: Effort normal.   Musculoskeletal:   Right sided weakness   Neurological: He is alert. A sensory deficit is present.   Not oriented to place or month   Skin: Skin is warm. Capillary refill takes less than 2 seconds.   Psychiatric: He has a normal mood and affect. His speech is slurred.   Vitals reviewed.      Neurological Exam:   LOC: alert  Attention Span: Good   Language: Expressive aphasia  Articulation: Dysarthria  Orientation: Not oriented to place, Not oriented to time  Visual Fields: Full  EOM (CN III, IV, VI): Full/intact  Pupils (CN II, III): PERRL  Facial Movement (CN VII): Lower facial weakness on the Right  Motor: Arm left  Normal 5/5  Leg left  Normal 5/5  Arm right  Plegia 0/5  Leg right Paresis: 1/5  Cebellar: No evidence of appendicular or axial ataxia  Sensation: Merrill-hypoesthesia right    Laboratory:  CMP:   No results for input(s):  GLUCOSE, CALCIUM, ALBUMIN, PROT, NA, K, CO2, CL, BUN, CREATININE, ALKPHOS, ALT, AST, BILITOT in the last 24 hours.  BMP:   Recent Labs   Lab 07/26/19  0328      K 3.8      CO2 23   BUN 8   CREATININE 0.8   CALCIUM 8.3*     CBC:   Recent Labs   Lab 07/27/19  0411   WBC 7.08   RBC 3.96*   HGB 12.6*   HCT 37.9*      MCV 96   MCH 31.8*   MCHC 33.2     Lipid Panel: No results for input(s): CHOL, LDLCALC, HDL, TRIG in the last 168 hours.  Coagulation:   No results for input(s): PT, INR, APTT in the last 168 hours.  Platelet Aggregation Study: No results for input(s): PLTAGG, PLTAGINTERP, PLTAGREGLACO, ADPPLTAGGREG in the last 168 hours.  Hgb A1C: No results for input(s): HGBA1C in the last 168 hours.  TSH: No results for input(s): TSH in the last 168 hours.    Diagnostic Results     Brain Imaging   MRI brain 7/17  Multifocal moderate to large regions of acute/recent infarction primarily left MCA territory.  There is superimposed petechial regions of hemorrhage within the left basal ganglia, insula and frontal and parietal lobes.    Please note there is superimposed small region of additional recent infarction and petechial hemorrhage in the left occipital lobe with moderate-sized remote posterior temporal infarct.    Localized mass effect without midline shift or hydrocephalus.  Clinical correlation and follow-up advised.      Vessel Imaging   CTA head and neck 7/13  Complete occlusion of the left M1 segment origin with some reconstitution of distal cortical branches through collateral flow.    Agree with [facility]k.    Angiogram 7/14  Cerebral angiography demonstrated left MCA origin occlusion.  This was treated with mechanical embolectomy using a aspiration technique, as above.    There was TICI 2C reperfusion.    Cardiac Imaging   ABDIAS 7/23  · Left atrial appendage with a well seated 21mm Watchman device. No peridevice leak on color doppler. No thrombus visualized.  · No intra-atrial shunt on bubble  study with positive valsalva  · No Valvular thrombus, mass or vegation visualized  · Normal left ventricular systolic function. The estimated ejection fraction is 55%  · Normal right ventricular systolic function.  · Moderate size Left pleural effusion  ·   TTE 7/14  · Normal left ventricular systolic function. The estimated ejection fraction is 60%  · Normal LV diastolic function.  · Normal right ventricular systolic function.  · Mild tricuspid regurgitation.  · Intermediate central venous pressure (8 mm Hg).  · The estimated PA systolic pressure is 33 mm Hg      Vahe Valencia NP  [facility]  Department of Vascular Neurology   [facility]

## 2019-07-27 NOTE — PLAN OF CARE
Problem: Physical Therapy Goal  Goal: Physical Therapy Goal  PT goals until 8/2/19    1. Pt supine to sit with minimal assist- MET 7/19      Updated: Pt will perform supine to sit with supervision   2. Pt sit to supine with minimal assist-not met  3. Pt sit to stand with LRAD with minimal assist-not met  4. Pt to perform gait 20ft with LRAD with max assist.-not met  5. Pt to go up/down curb step with LRAD with max assist.-not met  6. Pt to transfer bed to/from bedside chair with moderate assist.-MET 7/25     Updated: Pt will perform bed to chair transfer with contact guard assistance   7. Pt to perform B LE exs in sitting or supine x 20 reps to strengthen B LE to improve functional mobility.-not met  8. Pt will propel wheelchair x 100 feet with minimal assistance          Outcome: Ongoing (interventions implemented as appropriate)  Goals remain appropriate.

## 2019-07-27 NOTE — SUBJECTIVE & OBJECTIVE
Neurologic Chief Complaint: L MCA    Subjective:     Interval History: Patient is seen for follow-up neurological assessment and treatment recommendations:   Platelet count 215 today. Urinary retention overnight, currently voiding spontaneously, c/o mild flank pain, ordered renal US.    HPI, Past Medical, Family, and Social History remains the same as documented in the initial encounter.     Review of Systems   Constitutional: Negative for fever.   Respiratory: Negative for shortness of breath.    Cardiovascular: Negative for chest pain.   Gastrointestinal: Negative for nausea and vomiting.   Genitourinary: Positive for difficulty urinating (overnight) and flank pain.   Musculoskeletal: Positive for gait problem.   Neurological: Positive for facial asymmetry, speech difficulty and weakness.   Psychiatric/Behavioral: Negative for agitation.     Scheduled Meds:   apixaban  5 mg Oral BID    FLUoxetine  20 mg Oral Daily    lisinopril  20 mg Oral Daily    senna-docusate 8.6-50 mg  1 tablet Oral BID     Continuous Infusions:  PRN Meds:acetaminophen, atropine, hydrALAZINE, ondansetron, sodium chloride 0.9%    Objective:     Vital Signs (Most Recent):  Temp: 97.1 °F (36.2 °C) (07/27/19 1548)  Pulse: 61 (07/27/19 1548)  Resp: 18 (07/27/19 1548)  BP: 112/76 (07/27/19 1548)  SpO2: (!) 93 % (07/27/19 1548)  BP Location: Left arm    Vital Signs Range (Last 24H):  Temp:  [97.1 °F (36.2 °C)-99.1 °F (37.3 °C)]   Pulse:  [56-78]   Resp:  [16-18]   BP: (106-134)/(65-83)   SpO2:  [92 %-97 %]   BP Location: Left arm    Physical Exam   Constitutional: He appears well-developed.   HENT:   Head: Normocephalic and atraumatic.   Cardiovascular: Normal rate.   Pulmonary/Chest: Effort normal.   Musculoskeletal:   Right sided weakness   Neurological: He is alert. A sensory deficit is present.   Not oriented to place or month   Skin: Skin is warm. Capillary refill takes less than 2 seconds.   Psychiatric: He has a normal mood and affect.  His speech is slurred.   Vitals reviewed.      Neurological Exam:   LOC: alert  Attention Span: Good   Language: Expressive aphasia  Articulation: Dysarthria  Orientation: Not oriented to place, Not oriented to time  Visual Fields: Full  EOM (CN III, IV, VI): Full/intact  Pupils (CN II, III): PERRL  Facial Movement (CN VII): Lower facial weakness on the Right  Motor: Arm left  Normal 5/5  Leg left  Normal 5/5  Arm right  Plegia 0/5  Leg right Paresis: 1/5  Cebellar: No evidence of appendicular or axial ataxia  Sensation: Merrill-hypoesthesia right    Laboratory:  CMP:   No results for input(s): GLUCOSE, CALCIUM, ALBUMIN, PROT, NA, K, CO2, CL, BUN, CREATININE, ALKPHOS, ALT, AST, BILITOT in the last 24 hours.  BMP:   Recent Labs   Lab 07/26/19  0328      K 3.8      CO2 23   BUN 8   CREATININE 0.8   CALCIUM 8.3*     CBC:   Recent Labs   Lab 07/27/19  0411   WBC 7.08   RBC 3.96*   HGB 12.6*   HCT 37.9*      MCV 96   MCH 31.8*   MCHC 33.2     Lipid Panel: No results for input(s): CHOL, LDLCALC, HDL, TRIG in the last 168 hours.  Coagulation:   No results for input(s): PT, INR, APTT in the last 168 hours.  Platelet Aggregation Study: No results for input(s): PLTAGG, PLTAGINTERP, PLTAGREGLACO, ADPPLTAGGREG in the last 168 hours.  Hgb A1C: No results for input(s): HGBA1C in the last 168 hours.  TSH: No results for input(s): TSH in the last 168 hours.    Diagnostic Results     Brain Imaging   MRI brain 7/17  Multifocal moderate to large regions of acute/recent infarction primarily left MCA territory.  There is superimposed petechial regions of hemorrhage within the left basal ganglia, insula and frontal and parietal lobes.    Please note there is superimposed small region of additional recent infarction and petechial hemorrhage in the left occipital lobe with moderate-sized remote posterior temporal infarct.    Localized mass effect without midline shift or hydrocephalus.  Clinical correlation and follow-up  advised.      Vessel Imaging   CTA head and neck 7/13  Complete occlusion of the left M1 segment origin with some reconstitution of distal cortical branches through collateral flow.    Agree with [facility].    Angiogram 7/14  Cerebral angiography demonstrated left MCA origin occlusion.  This was treated with mechanical embolectomy using a aspiration technique, as above.    There was TICI 2C reperfusion.    Cardiac Imaging   ABDIAS 7/23  · Left atrial appendage with a well seated 21mm Watchman device. No peridevice leak on color doppler. No thrombus visualized.  · No intra-atrial shunt on bubble study with positive valsalva  · No Valvular thrombus, mass or vegation visualized  · Normal left ventricular systolic function. The estimated ejection fraction is 55%  · Normal right ventricular systolic function.  · Moderate size Left pleural effusion  ·   TTE 7/14  · Normal left ventricular systolic function. The estimated ejection fraction is 60%  · Normal LV diastolic function.  · Normal right ventricular systolic function.  · Mild tricuspid regurgitation.  · Intermediate central venous pressure (8 mm Hg).  · The estimated PA systolic pressure is 33 mm Hg

## 2019-07-28 LAB
ALBUMIN SERPL BCP-MCNC: 2.7 G/DL (ref 3.5–5.2)
ALP SERPL-CCNC: 72 U/L (ref 55–135)
ALT SERPL W/O P-5'-P-CCNC: 47 U/L (ref 10–44)
AMORPH CRY UR QL COMP ASSIST: ABNORMAL
ANION GAP SERPL CALC-SCNC: 10 MMOL/L (ref 8–16)
AST SERPL-CCNC: 31 U/L (ref 10–40)
BACTERIA #/AREA URNS AUTO: ABNORMAL /HPF
BASOPHILS # BLD AUTO: 0.07 K/UL (ref 0–0.2)
BASOPHILS NFR BLD: 0.7 % (ref 0–1.9)
BILIRUB SERPL-MCNC: 0.4 MG/DL (ref 0.1–1)
BILIRUB UR QL STRIP: NEGATIVE
BUN SERPL-MCNC: 11 MG/DL (ref 6–20)
CALCIUM SERPL-MCNC: 8.7 MG/DL (ref 8.7–10.5)
CHLORIDE SERPL-SCNC: 102 MMOL/L (ref 95–110)
CLARITY UR REFRACT.AUTO: ABNORMAL
CO2 SERPL-SCNC: 26 MMOL/L (ref 23–29)
COLOR UR AUTO: YELLOW
CREAT SERPL-MCNC: 0.8 MG/DL (ref 0.5–1.4)
DIFFERENTIAL METHOD: ABNORMAL
EOSINOPHIL # BLD AUTO: 0.4 K/UL (ref 0–0.5)
EOSINOPHIL NFR BLD: 4.4 % (ref 0–8)
ERYTHROCYTE [DISTWIDTH] IN BLOOD BY AUTOMATED COUNT: 12.8 % (ref 11.5–14.5)
EST. GFR  (AFRICAN AMERICAN): >60 ML/MIN/1.73 M^2
EST. GFR  (NON AFRICAN AMERICAN): >60 ML/MIN/1.73 M^2
GLUCOSE SERPL-MCNC: 86 MG/DL (ref 70–110)
GLUCOSE UR QL STRIP: NEGATIVE
HCT VFR BLD AUTO: 42.4 % (ref 40–54)
HGB BLD-MCNC: 13.7 G/DL (ref 14–18)
HGB UR QL STRIP: ABNORMAL
IMM GRANULOCYTES # BLD AUTO: 0.17 K/UL (ref 0–0.04)
IMM GRANULOCYTES NFR BLD AUTO: 1.7 % (ref 0–0.5)
KETONES UR QL STRIP: ABNORMAL
LEUKOCYTE ESTERASE UR QL STRIP: ABNORMAL
LYMPHOCYTES # BLD AUTO: 1 K/UL (ref 1–4.8)
LYMPHOCYTES NFR BLD: 10.4 % (ref 18–48)
MAGNESIUM SERPL-MCNC: 1.9 MG/DL (ref 1.6–2.6)
MCH RBC QN AUTO: 31.6 PG (ref 27–31)
MCHC RBC AUTO-ENTMCNC: 32.3 G/DL (ref 32–36)
MCV RBC AUTO: 98 FL (ref 82–98)
MICROSCOPIC COMMENT: ABNORMAL
MONOCYTES # BLD AUTO: 0.8 K/UL (ref 0.3–1)
MONOCYTES NFR BLD: 8 % (ref 4–15)
NEUTROPHILS # BLD AUTO: 7.5 K/UL (ref 1.8–7.7)
NEUTROPHILS NFR BLD: 74.8 % (ref 38–73)
NITRITE UR QL STRIP: POSITIVE
NRBC BLD-RTO: 0 /100 WBC
PH UR STRIP: 6 [PH] (ref 5–8)
PHOSPHATE SERPL-MCNC: 4 MG/DL (ref 2.7–4.5)
PLATELET # BLD AUTO: 233 K/UL (ref 150–350)
PMV BLD AUTO: 10.2 FL (ref 9.2–12.9)
POTASSIUM SERPL-SCNC: 4.3 MMOL/L (ref 3.5–5.1)
PROT SERPL-MCNC: 6 G/DL (ref 6–8.4)
PROT UR QL STRIP: NEGATIVE
RBC # BLD AUTO: 4.34 M/UL (ref 4.6–6.2)
RBC #/AREA URNS AUTO: 10 /HPF (ref 0–4)
SODIUM SERPL-SCNC: 138 MMOL/L (ref 136–145)
SP GR UR STRIP: 1.02 (ref 1–1.03)
SQUAMOUS #/AREA URNS AUTO: 0 /HPF
URN SPEC COLLECT METH UR: ABNORMAL
WBC # BLD AUTO: 9.98 K/UL (ref 3.9–12.7)
WBC #/AREA URNS AUTO: 28 /HPF (ref 0–5)

## 2019-07-28 PROCEDURE — 20600001 HC STEP DOWN PRIVATE ROOM

## 2019-07-28 PROCEDURE — 25000003 PHARM REV CODE 250: Performed by: NURSE PRACTITIONER

## 2019-07-28 PROCEDURE — 97535 SELF CARE MNGMENT TRAINING: CPT

## 2019-07-28 PROCEDURE — 25000003 PHARM REV CODE 250: Performed by: FAMILY MEDICINE

## 2019-07-28 PROCEDURE — 81001 URINALYSIS AUTO W/SCOPE: CPT

## 2019-07-28 PROCEDURE — 80053 COMPREHEN METABOLIC PANEL: CPT

## 2019-07-28 PROCEDURE — 99232 SBSQ HOSP IP/OBS MODERATE 35: CPT | Mod: ,,, | Performed by: PSYCHIATRY & NEUROLOGY

## 2019-07-28 PROCEDURE — 84100 ASSAY OF PHOSPHORUS: CPT

## 2019-07-28 PROCEDURE — 36415 COLL VENOUS BLD VENIPUNCTURE: CPT

## 2019-07-28 PROCEDURE — 99232 PR SUBSEQUENT HOSPITAL CARE,LEVL II: ICD-10-PCS | Mod: ,,, | Performed by: PSYCHIATRY & NEUROLOGY

## 2019-07-28 PROCEDURE — 25000003 PHARM REV CODE 250: Performed by: PHYSICIAN ASSISTANT

## 2019-07-28 PROCEDURE — 85025 COMPLETE CBC W/AUTO DIFF WBC: CPT

## 2019-07-28 PROCEDURE — 83735 ASSAY OF MAGNESIUM: CPT

## 2019-07-28 RX ORDER — CIPROFLOXACIN 500 MG/1
500 TABLET ORAL EVERY 12 HOURS
Status: DISCONTINUED | OUTPATIENT
Start: 2019-07-28 | End: 2019-07-29

## 2019-07-28 RX ADMIN — FLUOXETINE 20 MG: 20 CAPSULE ORAL at 09:07

## 2019-07-28 RX ADMIN — LISINOPRIL 20 MG: 20 TABLET ORAL at 09:07

## 2019-07-28 RX ADMIN — APIXABAN 5 MG: 5 TABLET, FILM COATED ORAL at 09:07

## 2019-07-28 RX ADMIN — CIPROFLOXACIN HYDROCHLORIDE 500 MG: 500 TABLET, FILM COATED ORAL at 11:07

## 2019-07-28 RX ADMIN — ACETAMINOPHEN 650 MG: 325 TABLET ORAL at 09:07

## 2019-07-28 RX ADMIN — CIPROFLOXACIN HYDROCHLORIDE 500 MG: 500 TABLET, FILM COATED ORAL at 09:07

## 2019-07-28 NOTE — SUBJECTIVE & OBJECTIVE
Neurologic Chief Complaint: L MCA    Subjective:     Interval History: Patient is seen for follow-up neurological assessment and treatment recommendations:   Intermittent urinary retention improving, renal US negative. Per nursing patient fell at side of bed, nursing denies LOC or hitting head, continue to monitor for changes.     HPI, Past Medical, Family, and Social History remains the same as documented in the initial encounter.     Review of Systems   Constitutional: Negative for fever.   Respiratory: Negative for shortness of breath.    Cardiovascular: Negative for chest pain.   Gastrointestinal: Negative for nausea and vomiting.   Genitourinary: Positive for difficulty urinating (overnight) and flank pain.   Musculoskeletal: Positive for gait problem.   Neurological: Positive for facial asymmetry, speech difficulty and weakness.   Psychiatric/Behavioral: Negative for agitation.     Scheduled Meds:   apixaban  5 mg Oral BID    ciprofloxacin HCl  500 mg Oral Q12H    FLUoxetine  20 mg Oral Daily    lisinopril  20 mg Oral Daily    senna-docusate 8.6-50 mg  1 tablet Oral BID     Continuous Infusions:  PRN Meds:acetaminophen, atropine, hydrALAZINE, ondansetron, sodium chloride 0.9%    Objective:     Vital Signs (Most Recent):  Temp: 97 °F (36.1 °C) (07/28/19 1200)  Pulse: 75 (07/28/19 1200)  Resp: 18 (07/28/19 1200)  BP: 118/72 (07/28/19 1200)  SpO2: 97 % (07/28/19 1200)  BP Location: Right arm    Vital Signs Range (Last 24H):  Temp:  [97 °F (36.1 °C)-98.2 °F (36.8 °C)]   Pulse:  [57-78]   Resp:  [17-18]   BP: (112-137)/(72-85)   SpO2:  [93 %-97 %]   BP Location: Right arm    Physical Exam   Constitutional: He appears well-developed.   HENT:   Head: Normocephalic and atraumatic.   Cardiovascular: Normal rate.   Pulmonary/Chest: Effort normal.   Musculoskeletal:   Right sided weakness   Neurological: He is alert. A sensory deficit is present.   Not oriented to place or month   Skin: Skin is warm. Capillary  refill takes less than 2 seconds.   Psychiatric: He has a normal mood and affect. His speech is slurred.   Vitals reviewed.      Neurological Exam:   LOC: alert  Attention Span: Good   Language: Expressive aphasia  Articulation: Dysarthria  Orientation: Not oriented to place, Not oriented to time  Visual Fields: Full  EOM (CN III, IV, VI): Full/intact  Pupils (CN II, III): PERRL  Facial Movement (CN VII): Lower facial weakness on the Right  Motor: Arm left  Normal 5/5  Leg left  Normal 5/5  Arm right  Plegia 0/5  Leg right Paresis: 1/5  Cebellar: No evidence of appendicular or axial ataxia  Sensation: Merrill-hypoesthesia right    Laboratory:  CMP:   Recent Labs   Lab 07/28/19  0345   CALCIUM 8.7   ALBUMIN 2.7*   PROT 6.0      K 4.3   CO2 26      BUN 11   CREATININE 0.8   ALKPHOS 72   ALT 47*   AST 31   BILITOT 0.4     BMP:   Recent Labs   Lab 07/28/19  0345      K 4.3      CO2 26   BUN 11   CREATININE 0.8   CALCIUM 8.7     CBC:   Recent Labs   Lab 07/28/19  0345   WBC 9.98   RBC 4.34*   HGB 13.7*   HCT 42.4      MCV 98   MCH 31.6*   MCHC 32.3     Lipid Panel: No results for input(s): CHOL, LDLCALC, HDL, TRIG in the last 168 hours.  Coagulation:   No results for input(s): PT, INR, APTT in the last 168 hours.  Platelet Aggregation Study: No results for input(s): PLTAGG, PLTAGINTERP, PLTAGREGLACO, ADPPLTAGGREG in the last 168 hours.  Hgb A1C: No results for input(s): HGBA1C in the last 168 hours.  TSH: No results for input(s): TSH in the last 168 hours.    Diagnostic Results     Brain Imaging   MRI brain 7/17  Multifocal moderate to large regions of acute/recent infarction primarily left MCA territory.  There is superimposed petechial regions of hemorrhage within the left basal ganglia, insula and frontal and parietal lobes.    Please note there is superimposed small region of additional recent infarction and petechial hemorrhage in the left occipital lobe with moderate-sized remote  posterior temporal infarct.    Localized mass effect without midline shift or hydrocephalus.  Clinical correlation and follow-up advised.      Vessel Imaging   CTA head and neck 7/13  Complete occlusion of the left M1 segment origin with some reconstitution of distal cortical branches through collateral flow.    Agree with princessk.    Angiogram 7/14  Cerebral angiography demonstrated left MCA origin occlusion.  This was treated with mechanical embolectomy using a aspiration technique, as above.    There was TICI 2C reperfusion.    Cardiac Imaging   ABDIAS 7/23  · Left atrial appendage with a well seated 21mm Watchman device. No peridevice leak on color doppler. No thrombus visualized.  · No intra-atrial shunt on bubble study with positive valsalva  · No Valvular thrombus, mass or vegation visualized  · Normal left ventricular systolic function. The estimated ejection fraction is 55%  · Normal right ventricular systolic function.  · Moderate size Left pleural effusion  ·   TTE 7/14  · Normal left ventricular systolic function. The estimated ejection fraction is 60%  · Normal LV diastolic function.  · Normal right ventricular systolic function.  · Mild tricuspid regurgitation.  · Intermediate central venous pressure (8 mm Hg).  · The estimated PA systolic pressure is 33 mm Hg

## 2019-07-28 NOTE — ASSESSMENT & PLAN NOTE
Reported urine retention and flank pain per pt and family overnight 7/26, 7/27.  Renal ultrasound 7/27 - No sonographic evidence of nephrolithiasis or obstructive uropathy  Currently voiding spontaneously w/o difficulty.

## 2019-07-28 NOTE — PT/OT/SLP PROGRESS
"Occupational Therapy   Treatment    Name: Justyn Rawls  MRN: [id number]  Admitting Diagnosis:  Stroke due to embolism of left middle cerebral artery  5 Days Post-Op    Recommendations:     Discharge Recommendations: rehabilitation facility  Discharge Equipment Recommendations:  none  Barriers to discharge:  Inaccessible home environment, Decreased caregiver support    Assessment:     Justyn Rawls is a 49 y.o. male with a medical diagnosis of Stroke due to embolism of left middle cerebral artery.  He presents with performance deficits affecting function are weakness, impaired endurance, impaired self care skills, impaired functional mobilty, gait instability, impaired balance, impaired cognition, decreased coordination, decreased upper extremity function, decreased lower extremity function, decreased safety awareness, abnormal tone, decreased ROM, impaired coordination, impaired fine motor.     Rehab Prognosis:  Good; patient would benefit from acute skilled OT services to address these deficits and reach maximum level of function.       Plan:     Patient to be seen 4 x/week to address the above listed problems via self-care/home management, therapeutic activities, therapeutic exercises, neuromuscular re-education, cognitive retraining, sensory integration  · Plan of Care Expires: 08/13/19  · Plan of Care Reviewed with: patient, spouse    Subjective   Wife:  "Can you see him today?  Yesterday he was confused and I am worried about him."    Pain/Comfort:  · Pain Rating 1: 0/10  · Pain Rating Post-Intervention 1: 0/10    Objective:     Communicated with: Nurse prior to session.  Patient found supine with peripheral IV, telemetry upon OT entry to room.  Wife present at the beginning of the session.     General Precautions: Standard, fall, aspiration, aphasia   Orthopedic Precautions:N/A   Braces: N/A     Occupational Performance:     Bed Mobility:    · Patient completed Rolling/Turning to Left with  minimum " assistance  · Patient completed Scooting/Bridging with minimum assistance  · Patient completed Supine to Sit with contact guard assistance  · Patient completed Sit to Supine with minimum assistance     Functional Mobility/Transfers:  · Patient completed Sit <> Stand Transfer with minimum assistance  with  no assistive device   · Patient completed Bed <> Chair Transfer using Stand Pivot technique with moderate assistance with no assistive device    Activities of Daily Living:  · Grooming: moderate assistance while standing with right UE in weight bearing  · Upper Body Dressing: moderate assistance while seated EOB  · Lower Body Dressing: maximal assistance while seated EOB      [facility] 6 Click ADL: 13    Treatment & Education:  Patient education provided on role of OT and need for rehab upon discharge.  Patient education provided on hemiplegic dressing technique, right UE weight bearing / positioning, postural control, and transfers.  Continued education, patient/ family training recommended. Patient alert; daily orientation provided. PROM performed right UE one set x 10 rep in all planes of motion with stretches provided at end range; sustained stretch provided for external rotation.  Assistance and facilitation provided for upward rotation of the scapula during shoulder flexion and abduction. Addressed right UE weight bearing while seated EOB.  Positioning provided for midline orientation with bilateral UEs elevated and heels lifted off mattress.   White board updated in patient's room.     Patient left supine with all lines intact, call button in reach and bed alarm onEducation:      GOALS:   Multidisciplinary Problems     Occupational Therapy Goals        Problem: Occupational Therapy Goal    Goal Priority Disciplines Outcome Interventions   Occupational Therapy Goal     OT, PT/OT     Description:  Goals set 7/25 to be addressed in 14 days, expiring 8/8:   Patient will increase functional independence with ADLs by  performing:    UE Dressing with Minimal Assistance with hemiplegic technique.  LE Dressing with Minimal Assistance.  Grooming while standing with Contact Guard Assistance.   Toileting from bedside commode with Minimal Assistance for hygiene and clothing management.   Rolling to Left with Contact Guard Assistance.   Rolling to Right with Supervision.   Stand pivot transfers with Contact Guard Assistance.  Patient/Caregivers will be independent in assisting in bed mobility/positioning  Patient/Caregivers will be independent in HEP for weightbearing with RUE, PROM of RUE.                         Time Tracking:     OT Date of Treatment: 07/28/19  OT Start Time: 0815  OT Stop Time: 0839  OT Total Time (min): 24 min    Billable Minutes:Self Care/Home Management 24    JONNY Rosario  7/28/2019

## 2019-07-28 NOTE — ASSESSMENT & PLAN NOTE
US abdomen (7/23): No significant abnormality of the abdominal organs. Trace right pleural effusion.    Statin held and currently trending liver enzymes - trending down; possibly restart tomorrow

## 2019-07-28 NOTE — PROGRESS NOTES
[facility]  Vascular Neurology  Comprehensive Stroke Center  Progress Note    Assessment/Plan:     * Stroke due to embolism of left middle cerebral artery  48 y/o male with L MCA stroke due to M1 occlusion received IV TPA and thrombectomy to TICI 2C, probable due to afib    Antithrombotics:  Eliquis 5 mg BID started 7/26    Statins: Lipitor 40 mg daily (currently on hold due to elevated AST/ALT)     Aggressive risk factor modification: A-Fib, alcohol     Rehab efforts: The patient has been evaluated by a stroke team provider and the therapy needs have been fully considered based off the presenting complaints and exam findings. The following therapy evaluations are needed: PT evaluate and treat, OT evaluate and treat, SLP evaluate and treat, PM&R evaluate for appropriate placement - recommending rehab     Diagnostics ordered/pending: NA    VTE prophylaxis: Eliquis 5 mg BID, mechanical SCDs    BP parameters: Infarct: Post sucessful thrombectomy, SBP <140        Urinary retention  Reported urine retention and flank pain per pt and family overnight 7/26, 7/27.  Renal ultrasound 7/27 - No sonographic evidence of nephrolithiasis or obstructive uropathy  Currently voiding spontaneously w/o difficulty.     Transaminitis  US abdomen (7/23): No significant abnormality of the abdominal organs. Trace right pleural effusion.    Statin held and currently trending liver enzymes - trending down; possibly restart tomorrow     Dysphagia  2/2 stroke   Aggressive SLP   Approved for regular thin     Hypertension  Stroke risk factor  SBP <140   On Lisinopril 20 mg - can titrate as needed   BP at goal within last 24 hours    Thrombocytopenia  Patient presents with normal PLTs count and dropped while hospitalization could be heparin related    92-->145-->181-->194-->215-->233    Continue to monitor platelet count daily while inpatient. CBC every 72 hrs X 3 when discharged to inpatient rehab.     Cytotoxic brain  edema  Large area of cytotoxic cerebral edema identified when reviewing brain imaging in the territory of the L middle cerebral artery. There is not mass effect associated with it. We will continue to monitor the patients clinical exam for any worsening of symptoms which may indicate expansion of the stroke or the area of the edema resulting in the clinical change. The pattern is suggestive of cardioembolic etiology.        Aphasia  Due to stroke  Aggressive therapy    Hemiparesis, right  Due to stroke  Aggressive therapy    PAF (paroxysmal atrial fibrillation)  Patient has watch vargas device when he was in . His wife states that he received anticoagulation before the device placement with no reported side effects    Afib is a Stroke risk factor  · Rate control  CHADVASC score: 2  HAS BLED score: 2    TTE (07/14/2019):   Normal left ventricular systolic function. The estimated ejection fraction is 60%  · Normal LV diastolic function.  · Normal right ventricular systolic function.  · Mild tricuspid regurgitation.  · Intermediate central venous pressure (8 mm Hg).  · The estimated PA systolic pressure is 33 mm Hg    ABDIAS (7/23/2019):    Left atrial appendage with a well seated 21mm Watchman device. No peridevice leak on color doppler. No thrombus visualized.  · No intra-atrial shunt on bubble study with positive valsalva  · No Valvular thrombus, mass or vegation visualized  · Normal left ventricular systolic function. The estimated ejection fraction is 55%  · Normal right ventricular systolic function.  · Moderate size Left pleural effusion    Needs anticoagulation - Started on Eliquis 5 mg BID 7/26 - continue to monitor platelet count           7/14 admit to [facility] L MCA embolic stroke, s/p tpa, s/p thrombectomy L M1 multiple clots removed. ETOH use, monitor for withdrawals.  7/15 scheduled MRI wo contrast for tomorrow morning with planning for chemical DVT ppx. Continued 2% hypertonic saline and mannitol.   7/16  MRI wo contrast currently postponed, awaiting clarification that atrial device is safe to scan.  No acute events today. Continued 2% saline and mannitol.   7/17 MRI wo contrast preformed today, awaiting results. Plans to start anticoagulation following MRI per stroke team.   7/18 Cardiology consulted today for recs on whether to start AC or preform a ABDIAS to check potential thrombi on watchman.   7/19 Platelet count decreased below 100 while Heparin. D/c heparin and hold on any AC.  Gave lasix IV 20 to help with fluid overload. D/c 2%HTS  7/20 Platelet count still below 100.  HIT antibody panel pending.  Hold on ABDIAS until thrombocytopenia resolved.   7/21: Developing HA. Repeated CTH with no significant interval changes   7/22: ABDIAS procedure tomorrow 7/23, NPO at midnight   7/23: ABDIAS complete, no thrombus seen on exam, 81 mg started, transfer to stroke pending   7/25 - Platelet count trending up - continue to watch trend and start AC potentially tomorrow. AST/ALT trending down.   7/26 - Platelet count 194 today. Started Eliquis 5 mg BID - discussed w/ wife. Continue to monitor daily CBC while inpatient and every 72 hours when discharge to rehab.   7/27 - Platelet count 215 today. Urinary retention overnight, currently voiding spontaneously, c/o mild flank pain, ordered renal US.  7/28 - Intermittent urinary retention improving, renal US negative. Per nursing patient fell at side of bed, nursing denies LOC or hitting head, continue to monitor for changes.       STROKE DOCUMENTATION   Acute Stroke Times   Last Known Normal Date: 07/13/19  Last Known Normal Time: 2130  Symptom Onset Date: 07/13/19  Symptom Onset Time: 2130  Stroke Team Called Date: 07/13/19  Stroke Team Called Time: 2245  Stroke Team Arrival Date: 07/14/19  Stroke Team Arrival Time: 0107  CT Interpretation Time: 2251  Decision to Treat Time for Alteplase: 2307(bolus given)  Decision to Treat Time for IR: 0107    NIH Scale:  1a. Level of Consciousness:  0-->Alert, keenly responsive  1b. LOC Questions: 1-->Answers one question correctly  1c. LOC Commands: 0-->Performs both tasks correctly  2. Best Gaze: 0-->Normal  3. Visual: 1-->Partial hemianopia  4. Facial Palsy: 2-->Partial paralysis (total or near-total paralysis of lower face)  5a. Motor Arm, Left: 0-->No drift, limb holds 90 (or 45) degrees for full 10 secs  5b. Motor Arm, Right: 4-->No movement  6a. Motor Leg, Left: 0-->No drift, leg holds 30 degree position for full 5 secs  6b. Motor Leg, Right: 3-->No effort against gravity, leg falls to bed immediately  7. Limb Ataxia: 0-->Absent  8. Sensory: 1-->Mild-to-moderate sensory loss, patient feels pinprick is less sharp or is dull on the affected side, or there is a loss of superficial pain with pinprick, but patient is aware of being touched  9. Best Language: 1-->Mild-to-moderate aphasia, some obvious loss of fluency or facility of comprehension, without significant limitation on ideas expressed or form of expression. Reduction of speech and/or comprehension, however, makes conversation. . . (see row details)  10. Dysarthria: 1-->Mild-to-moderate dysarthria, patient slurs at least some words and, at worst, can be understood with some difficulty  11. Extinction and Inattention (formerly Neglect): 0-->No abnormality  Total (NIH Stroke Scale): 14       Modified [place] Score: 0  Celeste Coma Scale:    ABCD2 Score:    [id number] Score:2  HAS -BLED Score:2  ICH Score:   Hunt & Ta Classification:      Hemorrhagic change of an Ischemic Stroke: Does this patient have an ischemic stroke with hemorrhagic changes? No     Neurologic Chief Complaint: L MCA    Subjective:     Interval History: Patient is seen for follow-up neurological assessment and treatment recommendations:   Intermittent urinary retention improving, renal US negative. Per nursing patient fell at side of bed, nursing denies LOC or hitting head, continue to monitor for changes.     HPI, Past Medical,  Family, and Social History remains the same as documented in the initial encounter.     Review of Systems   Constitutional: Negative for fever.   Respiratory: Negative for shortness of breath.    Cardiovascular: Negative for chest pain.   Gastrointestinal: Negative for nausea and vomiting.   Genitourinary: Positive for difficulty urinating (overnight) and flank pain.   Musculoskeletal: Positive for gait problem.   Neurological: Positive for facial asymmetry, speech difficulty and weakness.   Psychiatric/Behavioral: Negative for agitation.     Scheduled Meds:   apixaban  5 mg Oral BID    ciprofloxacin HCl  500 mg Oral Q12H    FLUoxetine  20 mg Oral Daily    lisinopril  20 mg Oral Daily    senna-docusate 8.6-50 mg  1 tablet Oral BID     Continuous Infusions:  PRN Meds:acetaminophen, atropine, hydrALAZINE, ondansetron, sodium chloride 0.9%    Objective:     Vital Signs (Most Recent):  Temp: 97 °F (36.1 °C) (07/28/19 1200)  Pulse: 75 (07/28/19 1200)  Resp: 18 (07/28/19 1200)  BP: 118/72 (07/28/19 1200)  SpO2: 97 % (07/28/19 1200)  BP Location: Right arm    Vital Signs Range (Last 24H):  Temp:  [97 °F (36.1 °C)-98.2 °F (36.8 °C)]   Pulse:  [57-78]   Resp:  [17-18]   BP: (112-137)/(72-85)   SpO2:  [93 %-97 %]   BP Location: Right arm    Physical Exam   Constitutional: He appears well-developed.   HENT:   Head: Normocephalic and atraumatic.   Cardiovascular: Normal rate.   Pulmonary/Chest: Effort normal.   Musculoskeletal:   Right sided weakness   Neurological: He is alert. A sensory deficit is present.   Not oriented to place or month   Skin: Skin is warm. Capillary refill takes less than 2 seconds.   Psychiatric: He has a normal mood and affect. His speech is slurred.   Vitals reviewed.      Neurological Exam:   LOC: alert  Attention Span: Good   Language: Expressive aphasia  Articulation: Dysarthria  Orientation: Not oriented to place, Not oriented to time  Visual Fields: Full  EOM (CN III, IV, VI):  Full/intact  Pupils (CN II, III): PERRL  Facial Movement (CN VII): Lower facial weakness on the Right  Motor: Arm left  Normal 5/5  Leg left  Normal 5/5  Arm right  Plegia 0/5  Leg right Paresis: 1/5  Cebellar: No evidence of appendicular or axial ataxia  Sensation: Merrill-hypoesthesia right    Laboratory:  CMP:   Recent Labs   Lab 07/28/19  0345   CALCIUM 8.7   ALBUMIN 2.7*   PROT 6.0      K 4.3   CO2 26      BUN 11   CREATININE 0.8   ALKPHOS 72   ALT 47*   AST 31   BILITOT 0.4     BMP:   Recent Labs   Lab 07/28/19  0345      K 4.3      CO2 26   BUN 11   CREATININE 0.8   CALCIUM 8.7     CBC:   Recent Labs   Lab 07/28/19  0345   WBC 9.98   RBC 4.34*   HGB 13.7*   HCT 42.4      MCV 98   MCH 31.6*   MCHC 32.3     Lipid Panel: No results for input(s): CHOL, LDLCALC, HDL, TRIG in the last 168 hours.  Coagulation:   No results for input(s): PT, INR, APTT in the last 168 hours.  Platelet Aggregation Study: No results for input(s): PLTAGG, PLTAGINTERP, PLTAGREGLACO, ADPPLTAGGREG in the last 168 hours.  Hgb A1C: No results for input(s): HGBA1C in the last 168 hours.  TSH: No results for input(s): TSH in the last 168 hours.    Diagnostic Results     Brain Imaging   MRI brain 7/17  Multifocal moderate to large regions of acute/recent infarction primarily left MCA territory.  There is superimposed petechial regions of hemorrhage within the left basal ganglia, insula and frontal and parietal lobes.    Please note there is superimposed small region of additional recent infarction and petechial hemorrhage in the left occipital lobe with moderate-sized remote posterior temporal infarct.    Localized mass effect without midline shift or hydrocephalus.  Clinical correlation and follow-up advised.      Vessel Imaging   CTA head and neck 7/13  Complete occlusion of the left M1 segment origin with some reconstitution of distal cortical branches through collateral flow.    Agree with nighthawk.    Angiogram  7/14  Cerebral angiography demonstrated left MCA origin occlusion.  This was treated with mechanical embolectomy using a aspiration technique, as above.    There was TICI 2C reperfusion.    Cardiac Imaging   ABDIAS 7/23  · Left atrial appendage with a well seated 21mm Watchman device. No peridevice leak on color doppler. No thrombus visualized.  · No intra-atrial shunt on bubble study with positive valsalva  · No Valvular thrombus, mass or vegation visualized  · Normal left ventricular systolic function. The estimated ejection fraction is 55%  · Normal right ventricular systolic function.  · Moderate size Left pleural effusion  ·   TTE 7/14  · Normal left ventricular systolic function. The estimated ejection fraction is 60%  · Normal LV diastolic function.  · Normal right ventricular systolic function.  · Mild tricuspid regurgitation.  · Intermediate central venous pressure (8 mm Hg).  · The estimated PA systolic pressure is 33 mm Hg      Vahe Valencia NP  [facility]  Department of Vascular Neurology   [facility]

## 2019-07-28 NOTE — NURSING
"Pt resting comfortably, no c/o pain.  Moves right arm spontaneously, but doesn't know he's doing it- when I would go in the room his right arm would be up on his chest -when it was at his side or on his abdomen before- I asked him if he was moving his arm himself and he said no.  Wife was concerned that he had a neuro change that she observed 7/27/19 a.m. But was not reported to day shift nurse. She stated that he wasn't recognizing her and was afraid he was "getting a UTI". I wwent in with her to assess him and he was at his previously observed neuro status from my earlier assessment, other family at bedside said they hadn't noticed anything unusual and I attribute his lack of recognition with being disoriented upon being woken up.  "

## 2019-07-28 NOTE — ASSESSMENT & PLAN NOTE
48 y/o male with L MCA stroke due to M1 occlusion received IV TPA and thrombectomy to TICI 2C, probable due to afib    Antithrombotics:  Eliquis 5 mg BID started 7/26    Statins: Lipitor 40 mg daily (currently on hold due to elevated AST/ALT)     Aggressive risk factor modification: A-Fib, alcohol     Rehab efforts: The patient has been evaluated by a stroke team provider and the therapy needs have been fully considered based off the presenting complaints and exam findings. The following therapy evaluations are needed: PT evaluate and treat, OT evaluate and treat, SLP evaluate and treat, PM&R evaluate for appropriate placement - recommending rehab     Diagnostics ordered/pending: NA    VTE prophylaxis: Eliquis 5 mg BID, mechanical SCDs    BP parameters: Infarct: Post sucessful thrombectomy, SBP <140

## 2019-07-28 NOTE — PLAN OF CARE
Problem: Occupational Therapy Goal  Goal: Occupational Therapy Goal  Goals set 7/25 to be addressed in 14 days, expiring 8/8:   Patient will increase functional independence with ADLs by performing:    UE Dressing with Minimal Assistance with hemiplegic technique.  LE Dressing with Minimal Assistance.  Grooming while standing with Contact Guard Assistance.   Toileting from bedside commode with Minimal Assistance for hygiene and clothing management.   Rolling to Left with Contact Guard Assistance.   Rolling to Right with Supervision.   Stand pivot transfers with Contact Guard Assistance.  Patient/Caregivers will be independent in assisting in bed mobility/positioning  Patient/Caregivers will be independent in HEP for weightbearing with RUE, PROM of RUE.        Goals remain appropriate.  JONNY Rosario  7/28/2019

## 2019-07-28 NOTE — ASSESSMENT & PLAN NOTE
Patient presents with normal PLTs count and dropped while hospitalization could be heparin related    92-->145-->181-->194-->215-->233    Continue to monitor platelet count daily while inpatient. CBC every 72 hrs X 3 when discharged to inpatient rehab.

## 2019-07-29 VITALS
WEIGHT: 197.06 LBS | DIASTOLIC BLOOD PRESSURE: 55 MMHG | HEIGHT: 76 IN | SYSTOLIC BLOOD PRESSURE: 95 MMHG | OXYGEN SATURATION: 98 % | RESPIRATION RATE: 18 BRPM | HEART RATE: 68 BPM | TEMPERATURE: 98 F | BODY MASS INDEX: 24 KG/M2

## 2019-07-29 PROBLEM — N30.00 ACUTE CYSTITIS: Status: ACTIVE | Noted: 2019-07-29

## 2019-07-29 LAB
BASOPHILS # BLD AUTO: 0.04 K/UL (ref 0–0.2)
BASOPHILS NFR BLD: 0.3 % (ref 0–1.9)
DIFFERENTIAL METHOD: ABNORMAL
EOSINOPHIL # BLD AUTO: 0 K/UL (ref 0–0.5)
EOSINOPHIL NFR BLD: 0.1 % (ref 0–8)
ERYTHROCYTE [DISTWIDTH] IN BLOOD BY AUTOMATED COUNT: 12.6 % (ref 11.5–14.5)
HCT VFR BLD AUTO: 39.6 % (ref 40–54)
HGB BLD-MCNC: 13.3 G/DL (ref 14–18)
IMM GRANULOCYTES # BLD AUTO: 0.11 K/UL (ref 0–0.04)
IMM GRANULOCYTES NFR BLD AUTO: 0.7 % (ref 0–0.5)
LYMPHOCYTES # BLD AUTO: 0.3 K/UL (ref 1–4.8)
LYMPHOCYTES NFR BLD: 1.8 % (ref 18–48)
MCH RBC QN AUTO: 31.4 PG (ref 27–31)
MCHC RBC AUTO-ENTMCNC: 33.6 G/DL (ref 32–36)
MCV RBC AUTO: 94 FL (ref 82–98)
MONOCYTES # BLD AUTO: 0.5 K/UL (ref 0.3–1)
MONOCYTES NFR BLD: 3 % (ref 4–15)
NEUTROPHILS # BLD AUTO: 14.4 K/UL (ref 1.8–7.7)
NEUTROPHILS NFR BLD: 94.1 % (ref 38–73)
NRBC BLD-RTO: 0 /100 WBC
PLATELET # BLD AUTO: 220 K/UL (ref 150–350)
PMV BLD AUTO: 10 FL (ref 9.2–12.9)
RBC # BLD AUTO: 4.23 M/UL (ref 4.6–6.2)
WBC # BLD AUTO: 15.33 K/UL (ref 3.9–12.7)

## 2019-07-29 PROCEDURE — 97127 HC THERAPEUTIC INTVTN, COGN FUNCTION - OT: CPT

## 2019-07-29 PROCEDURE — 63600175 PHARM REV CODE 636 W HCPCS: Performed by: NURSE PRACTITIONER

## 2019-07-29 PROCEDURE — 25000003 PHARM REV CODE 250: Performed by: PHYSICIAN ASSISTANT

## 2019-07-29 PROCEDURE — 97112 NEUROMUSCULAR REEDUCATION: CPT

## 2019-07-29 PROCEDURE — 97530 THERAPEUTIC ACTIVITIES: CPT

## 2019-07-29 PROCEDURE — 97535 SELF CARE MNGMENT TRAINING: CPT

## 2019-07-29 PROCEDURE — 99233 PR SUBSEQUENT HOSPITAL CARE,LEVL III: ICD-10-PCS | Mod: ,,, | Performed by: PSYCHIATRY & NEUROLOGY

## 2019-07-29 PROCEDURE — 99232 SBSQ HOSP IP/OBS MODERATE 35: CPT | Mod: ,,, | Performed by: NURSE PRACTITIONER

## 2019-07-29 PROCEDURE — 99233 SBSQ HOSP IP/OBS HIGH 50: CPT | Mod: ,,, | Performed by: PSYCHIATRY & NEUROLOGY

## 2019-07-29 PROCEDURE — 25000003 PHARM REV CODE 250: Performed by: FAMILY MEDICINE

## 2019-07-29 PROCEDURE — 92507 TX SP LANG VOICE COMM INDIV: CPT

## 2019-07-29 PROCEDURE — 85025 COMPLETE CBC W/AUTO DIFF WBC: CPT

## 2019-07-29 PROCEDURE — 36415 COLL VENOUS BLD VENIPUNCTURE: CPT

## 2019-07-29 PROCEDURE — 25000003 PHARM REV CODE 250: Performed by: NURSE PRACTITIONER

## 2019-07-29 PROCEDURE — 99232 PR SUBSEQUENT HOSPITAL CARE,LEVL II: ICD-10-PCS | Mod: ,,, | Performed by: NURSE PRACTITIONER

## 2019-07-29 RX ORDER — AMOXICILLIN 250 MG
1 CAPSULE ORAL 2 TIMES DAILY
Start: 2019-07-29 | End: 2022-06-01 | Stop reason: CLARIF

## 2019-07-29 RX ORDER — FLUOXETINE HYDROCHLORIDE 20 MG/1
20 CAPSULE ORAL DAILY
Qty: 30 CAPSULE | Refills: 11
Start: 2019-07-30 | End: 2020-07-29

## 2019-07-29 RX ORDER — CIPROFLOXACIN 500 MG/1
500 TABLET ORAL EVERY 12 HOURS
Status: DISCONTINUED | OUTPATIENT
Start: 2019-07-29 | End: 2019-07-29 | Stop reason: HOSPADM

## 2019-07-29 RX ORDER — LISINOPRIL 10 MG/1
10 TABLET ORAL DAILY
Status: DISCONTINUED | OUTPATIENT
Start: 2019-07-30 | End: 2019-07-29 | Stop reason: HOSPADM

## 2019-07-29 RX ORDER — ONDANSETRON 4 MG/1
8 TABLET, ORALLY DISINTEGRATING ORAL EVERY 8 HOURS PRN
Start: 2019-07-29 | End: 2022-06-01 | Stop reason: CLARIF

## 2019-07-29 RX ORDER — ATORVASTATIN CALCIUM 40 MG/1
40 TABLET, FILM COATED ORAL DAILY
Qty: 90 TABLET | Refills: 3
Start: 2019-07-30 | End: 2022-08-06

## 2019-07-29 RX ORDER — LISINOPRIL 10 MG/1
10 TABLET ORAL DAILY
Qty: 90 TABLET | Refills: 3
Start: 2019-07-30 | End: 2020-07-29

## 2019-07-29 RX ORDER — ATORVASTATIN CALCIUM 20 MG/1
40 TABLET, FILM COATED ORAL DAILY
Status: DISCONTINUED | OUTPATIENT
Start: 2019-07-29 | End: 2019-07-29 | Stop reason: HOSPADM

## 2019-07-29 RX ORDER — TAMSULOSIN HYDROCHLORIDE 0.4 MG/1
0.4 CAPSULE ORAL DAILY
Status: DISCONTINUED | OUTPATIENT
Start: 2019-07-29 | End: 2019-07-29 | Stop reason: HOSPADM

## 2019-07-29 RX ORDER — ACETAMINOPHEN 325 MG/1
650 TABLET ORAL EVERY 6 HOURS PRN
Refills: 0
Start: 2019-07-29

## 2019-07-29 RX ORDER — TAMSULOSIN HYDROCHLORIDE 0.4 MG/1
0.4 CAPSULE ORAL DAILY
Qty: 30 CAPSULE | Refills: 11
Start: 2019-07-30 | End: 2020-07-29

## 2019-07-29 RX ORDER — CIPROFLOXACIN 500 MG/1
500 TABLET ORAL EVERY 12 HOURS
Start: 2019-07-29 | End: 2019-08-07

## 2019-07-29 RX ADMIN — ONDANSETRON 8 MG: 2 INJECTION INTRAMUSCULAR; INTRAVENOUS at 01:07

## 2019-07-29 RX ADMIN — ACETAMINOPHEN 650 MG: 325 TABLET ORAL at 10:07

## 2019-07-29 RX ADMIN — TAMSULOSIN HYDROCHLORIDE 0.4 MG: 0.4 CAPSULE ORAL at 11:07

## 2019-07-29 RX ADMIN — APIXABAN 5 MG: 5 TABLET, FILM COATED ORAL at 10:07

## 2019-07-29 RX ADMIN — CIPROFLOXACIN HYDROCHLORIDE 500 MG: 500 TABLET, FILM COATED ORAL at 10:07

## 2019-07-29 RX ADMIN — ATORVASTATIN CALCIUM 40 MG: 20 TABLET, FILM COATED ORAL at 10:07

## 2019-07-29 RX ADMIN — FLUOXETINE 20 MG: 20 CAPSULE ORAL at 10:07

## 2019-07-29 RX ADMIN — LISINOPRIL 20 MG: 20 TABLET ORAL at 10:07

## 2019-07-29 NOTE — ASSESSMENT & PLAN NOTE
Reported urinary retention and flank pain per pt and family overnight 7/26 and 7/27.  US Abd 7/24 No acute abnormality. Renal US 7/27 - No sonographic evidence of nephrolithiasis or obstructive uropathy.  Ordered stat CXR 7/29 to R/O effusion as possible cause for pt's flank pain.  Treating for complicated UTI (see above)  Continuing PRN bladder scan, straight cath; Monitoring pt for spontaneous voiding.  Also started Flomax on 7/29; Will monitor response

## 2019-07-29 NOTE — PLAN OF CARE
[facility]    FACILITY TRANSFER ORDERS      Patient Name: Justyn Rawls  YOB: 1970    PCP: No primary care provider on file.   PCP Address: No primary physician on file.  PCP Phone Number: None  PCP Fax: None    Encounter Date: 07/29/2019    Admit to: [facility]    Vital Signs:  Routine    Diagnoses:   Active Hospital Problems    Diagnosis  POA    *Stroke due to embolism of left middle cerebral artery [I63.412]  Yes     Priority: 1 - High    Acute cystitis [N30.00]  No     Priority: 2     Urinary retention [R33.9]  No     Priority: 2     Transaminitis [R74.0]  No     Priority: 3     Essential hypertension [I10]  Yes     Priority: 3     Thrombocytopenia [D69.6]  No     Priority: 3     PAF (paroxysmal atrial fibrillation) [I48.0]  Yes     Priority: 3     Cytotoxic cerebral edema [G93.6]  Yes     Priority: 4     Hemiparesis, right [G81.91]  Yes     Priority: 4     Aphasia [R47.01]  Yes     Priority: 4     Moderate malnutrition [E44.0]  Yes     Priority: 5      Recommendations     1. Continue regular diet with Boost Plus TID. 2. Discontinue Boost Plus and add Boost Pudding TID.  Goals: Pt to meet % EEN and EPN by RD follow-up.   Nutrition Goal Status: goal met  Communication of RD Recs: other (comment)(POC)    Assessment and Plan     Nutrition Problem:  Moderate Protein-Calorie Malnutrition  Malnutrition in the context of Acute Illness/Injury     Related to (etiology):  Poor intake in setting of stroke/alcohol withdrawal     Signs and Symptoms (as evidenced by):  Energy Intake: <50% of estimated energy requirement for 10 days  Body Fat Depletion: mild depletion of orbitals, triceps   Muscle Mass Depletion: mild depletion of temples, lower extremities     Interventions/Recommendations (treatment strategy):  Collaboration of care to providers.     Nutrition Diagnosis Status:  New        Dysphagia [R13.10]  No     Priority: 5     Cerebral infarction due to  embolism of left middle cerebral artery [I63.412]  Yes    Brain compression [G93.5]  Yes    Acute metabolic encephalopathy [G93.41]  Yes    Limitation of activities due to disability [Z73.6]  Yes     From stroke      ETOH abuse [F10.10]  Yes      Resolved Hospital Problems    Diagnosis Date Resolved POA    S/P admn tPA in diff fac w/n last 24 hr bef adm to crnt fac [Z92.82] 07/24/2019 Not Applicable       Allergies:Review of patient's allergies indicates:  No Known Allergies    Diet: regular diet and fluid consistency thin    Activities: Activity as tolerated    Nursing: Per facility protocol     Labs: CBC Daily for 3 days (monitor platelets)    CONSULTS:    Physical Therapy to evaluate and treat. , Occupational Therapy to evaluate and treat., Speech Therapy to evaluate and treat for Language, Swallowing and Cognition. and  to evaluate for community resources/long-range planning.    MISCELLANEOUS CARE:  Routine Skin for Bedridden Patients: Apply moisture barrier cream to all skin folds and wet areas in perineal area daily and after baths and all bowel movements.    WOUND CARE ORDERS  None    Medications: Review discharge medications with patient and family and provide education.      Current Discharge Medication List      START taking these medications    Details   acetaminophen (TYLENOL) 325 MG tablet Take 2 tablets (650 mg total) by mouth every 6 (six) hours as needed.  Refills: 0      apixaban (ELIQUIS) 5 mg Tab Take 1 tablet (5 mg total) by mouth 2 (two) times daily.      atorvastatin (LIPITOR) 40 MG tablet Take 1 tablet (40 mg total) by mouth once daily.  Qty: 90 tablet, Refills: 3      ciprofloxacin HCl (CIPRO) 500 MG tablet Take 1 tablet (500 mg total) by mouth every 12 (twelve) hours. for 17 doses      FLUoxetine 20 MG capsule Take 1 capsule (20 mg total) by mouth once daily.  Qty: 30 capsule, Refills: 11      lisinopril 10 MG tablet Take 1 tablet (10 mg total) by mouth once daily.  Qty: 90  tablet, Refills: 3      ondansetron (ZOFRAN-ODT) 4 MG TbDL Take 2 tablets (8 mg total) by mouth every 8 (eight) hours as needed (Nausea, vomiting).      senna-docusate 8.6-50 mg (PERICOLACE) 8.6-50 mg per tablet Take 1 tablet by mouth 2 (two) times daily.      tamsulosin (FLOMAX) 0.4 mg Cap Take 1 capsule (0.4 mg total) by mouth once daily.  Qty: 30 capsule, Refills: 11              Follow-up With    Details  Why  Contact Info [facility]  In 1 week  Call your PCP or establish care with a primary care provider for long-term management of medical conditions. Need to schedule hospital follow-up appt within 1 week of discharge.    [address]  [facility] [id number] [phone]   [facility] VASCULAR NEUROLOGY  In 6 weeks  Someone from our office will call you to set up hospital follow-up in 6-8 weeks. If nobody calls within 1 week, call number above to schedule an appt.    [address]  [facility] [id number]  [phone]   [facility] ARRHYTHMIA  In 2 weeks  Need to establish care and consider ablation for AFib (per Cardiology consult during hospital stay.)  [address]  [facility] [id number]  [phone]                 _________________________________  Daisy Ibrahim PA-C  07/29/2019

## 2019-07-29 NOTE — PROGRESS NOTES
[facility]  Physical Medicine & Rehab  Progress Note    Patient Name: Justyn Rawls  MRN: [id number]  Admission Date: 7/14/2019  Length of Stay: 15 days  Attending Physician: Devon Dawkins MD    Subjective:     Principal Problem:Stroke due to embolism of left middle cerebral artery    Hospital Course:   7/23/19: Participated w/ PT. Bed mobility min- modA. [place]is with scooting along the EOB. Grooming modA.   7/25/19: Participated w/ OT & PT. Bed mobility CGA- [place]is. Sit to stand modA. Bed to w/c transfer modA. Propelled w/c manually Nargis 75 ft. Grooming Nargis. UBD modA. LBD maxA.   7/27/19: Participated with PT and OT. Bed mobility SBA-CGA.  Sit to stand min-modA and transfers modA.  EOB SV.  Grooming and UBD modA.  LBD maxA.      Interval History 7/29/2019:  Patient is seen for follow-up rehab evaluation and recommendations: Fall yesterday; denies trauma/injury or LOC.  Started on Eliquis on Friday.  Participating with therapy.     HPI, Past Medical, Family, and Social History remains the same as documented in the initial encounter.    Scheduled Medications:    apixaban  5 mg Oral BID    atorvastatin  40 mg Oral Daily    ciprofloxacin HCl  500 mg Oral Q12H    FLUoxetine  20 mg Oral Daily    lisinopril  20 mg Oral Daily    senna-docusate 8.6-50 mg  1 tablet Oral BID     PRN Medications: acetaminophen, atropine, hydrALAZINE, ondansetron, sodium chloride 0.9%    Review of Systems   Constitutional: Positive for activity change. Negative for fatigue and fever.   HENT: Negative for trouble swallowing and voice change.    Eyes: Negative for pain and visual disturbance.   Respiratory: Negative for cough and shortness of breath.    Cardiovascular: Negative for chest pain and palpitations.   Gastrointestinal: Negative for abdominal pain, nausea and vomiting.   Genitourinary: Positive for difficulty urinating. Negative for flank pain.   Musculoskeletal: Positive for myalgias. Negative for back pain  and neck pain.   Skin: Negative for rash and wound.   Neurological: Positive for speech difficulty, weakness and numbness.   Psychiatric/Behavioral: Negative for agitation. The patient is not nervous/anxious.      Objective:     Vital Signs (Most Recent):  Temp: 99 °F (37.2 °C) (07/29/19 0741)  Pulse: 86 (07/29/19 0743)  Resp: 18 (07/29/19 0741)  BP: 106/67 (07/29/19 0741)  SpO2: 97 % (07/29/19 0741)    Vital Signs (24h Range):  Temp:  [97 °F (36.1 °C)-99.1 °F (37.3 °C)] 99 °F (37.2 °C)  Pulse:  [63-95] 86  Resp:  [16-18] 18  SpO2:  [94 %-97 %] 97 %  BP: (100-118)/(63-76) 106/67     Physical Exam   Constitutional: He appears well-developed and well-nourished. No distress.   HENT:   Head: Normocephalic and atraumatic.   Right Ear: External ear normal.   Left Ear: External ear normal.   Nose: Nose normal.   Eyes: Right eye exhibits no discharge. Left eye exhibits no discharge. No scleral icterus.   Neck: Normal range of motion.   Cardiovascular: Normal rate and intact distal pulses.   Pulmonary/Chest: Effort normal. No respiratory distress. He has no wheezes.   Abdominal: Soft. He exhibits no distension. There is no tenderness.   Musculoskeletal: He exhibits no edema or tenderness.   Neurological: He is alert. A sensory deficit is present. He exhibits abnormal muscle tone.   -  Mental Status:  Awake and alert.  Follows commands.   -  Speech and language:  + aphasia, no dysarthria.    -  Motor: RUE: 0/5.  LUE: 5/5.  RLE: 1/5.  LLE: 5/5.  -  Tone:  Increased RLE.   Skin: Skin is warm and dry. No rash noted.   Psychiatric: He has a normal mood and affect. His behavior is normal.   Vitals reviewed.    Diagnostic Results:   Labs: Reviewed  X-Ray: Reviewed  CT: Reviewed  MRI: Reviewed     Assessment/Plan:      * Stroke due to embolism of left middle cerebral artery  - CT/CTA showed proximal L MCA occlusion s/p tPA s/p thrombectomy  - Management per Vascular Neurology  See hospital course for functional,  cognitive/speech/language, and nutrition/swallow status.    Recommendations  -  Encourage mobility, OOB in chair, and early ambulation as appropriate   -  PT/OT evaluate and treat  -  SLP speech and cognitive evaluate and treat  -  Monitor mood and sleep disturbances  -  Establish consistent sleep-wake cycle  -  Monitor for bowel and bladder dysfunction  -  Monitor for shoulder pain and subluxation  -  Monitor for spasticity  -  DVT prophylaxis  -  Monitor for and prevent skin breakdown and pressure ulcers  · Early mobility, repositioning/weight shifting every 20-30 minutes when sitting, turn patient every 2 hours, proper mattress/overlay and chair cushioning, pressure relief/heel protector boots    Transaminitis  - LFTs trending down    Aphasia  - 2/2 stroke  - SLP eval and treat    Hemiparesis, right  - 2/2 stroke  - PT and OT eval and treat    PAF (paroxysmal atrial fibrillation)  - Started on Eliquis on 7/26/19    Continue to recommend Inpatient Rehab.  Insurance approval pending.  Will follow.     ANKUR Adhikari  Department of Physical Medicine & Rehab   [facility]

## 2019-07-29 NOTE — PLAN OF CARE
Problem: Physical Therapy Goal  Goal: Physical Therapy Goal  PT goals until 8/2/19    1. Pt supine to sit with minimal assist- MET 7/19      Updated: Pt will perform supine to sit with supervision   2. Pt sit to supine with minimal assist-MET  - Pt will perform sit to supine with SBA using natacha technique  3. Pt sit to stand with LRAD with minimal assist-not met  4. Pt to perform gait 20ft with LRAD with max assist.-not met  5. Pt to go up/down curb step with LRAD with max assist.-not met  6. Pt to transfer bed to/from bedside chair with moderate assist.-MET 7/25     Updated: Pt will perform bed to chair transfer with contact guard assistance   7. Pt to perform B LE exs in sitting or supine x 20 reps to strengthen B LE to improve functional mobility.-not met  8. Pt will propel wheelchair x 100 feet with minimal assistance          Outcome: Ongoing (interventions implemented as appropriate)  Patient participated well in therapy.  POC and goals were reviewed and remain appropriate.  Please refer to the progress note for functional mobility and activities performed during the treatment session.     Mobility: Pt is safe to perform transfers with nursing using moderate assistance, block R knee for stability     Audrey Olson, PT  7/29/2019  [phone] (pager)

## 2019-07-29 NOTE — ASSESSMENT & PLAN NOTE
WBC 7 > 9 > 15; Pt having issues with urinary retention  UA 7/28 with POS Nitrites, 28 WBC (possible un-treated UTI on 7/19?)  Discussed with Hospital Medicine, appreciate recs - Complicated UTI due to pt male  Plan for Cipro 500mg BID x 10 day course (start date 7/28)

## 2019-07-29 NOTE — PLAN OF CARE
07/29/19 1620   Final Note   Assessment Type Final Discharge Note   Anticipated Discharge Disposition Rehab   Hospital Follow Up  Appt(s) scheduled? Yes   Discharge plans and expectations educations in teach back method with documentation complete? Yes   Right Care Referral Info   Post Acute Recommendation No Care   Referral Type IRF   Facility Name [facility] [place] [address]

## 2019-07-29 NOTE — ASSESSMENT & PLAN NOTE
US Abdomen (7/23): No significant abnormality of the abdominal organs. Trace right pleural effusion.  Statin had been held, currently liver enzymes trending back down.  Restarted Atorvastatin 40mg Daily on 7/29; Will monitor response

## 2019-07-29 NOTE — PLAN OF CARE
07/29/19 1616   Post-Acute Status   Post-Acute Authorization Placement   Post-Acute Placement Status Set-up Complete       Pt has been accepted by [facility].  Nurse can call report to [phone].  Transport setup for 5:15 by w/josee DONNELLY in contact with CM and Medical staff.    Ayaan Flores LMSW  [facility]   Ext. [phone]

## 2019-07-29 NOTE — SUBJECTIVE & OBJECTIVE
Interval History 7/29/2019:  Patient is seen for follow-up rehab evaluation and recommendations: Fall yesterday; denies trauma/injury or LOC.  Started on Eliquis on Friday.  Participating with therapy.     HPI, Past Medical, Family, and Social History remains the same as documented in the initial encounter.    Scheduled Medications:    apixaban  5 mg Oral BID    atorvastatin  40 mg Oral Daily    ciprofloxacin HCl  500 mg Oral Q12H    FLUoxetine  20 mg Oral Daily    lisinopril  20 mg Oral Daily    senna-docusate 8.6-50 mg  1 tablet Oral BID     PRN Medications: acetaminophen, atropine, hydrALAZINE, ondansetron, sodium chloride 0.9%    Review of Systems   Constitutional: Positive for activity change. Negative for fatigue and fever.   HENT: Negative for trouble swallowing and voice change.    Eyes: Negative for pain and visual disturbance.   Respiratory: Negative for cough and shortness of breath.    Cardiovascular: Negative for chest pain and palpitations.   Gastrointestinal: Negative for abdominal pain, nausea and vomiting.   Genitourinary: Positive for difficulty urinating. Negative for flank pain.   Musculoskeletal: Positive for myalgias. Negative for back pain and neck pain.   Skin: Negative for rash and wound.   Neurological: Positive for speech difficulty, weakness and numbness.   Psychiatric/Behavioral: Negative for agitation. The patient is not nervous/anxious.      Objective:     Vital Signs (Most Recent):  Temp: 99 °F (37.2 °C) (07/29/19 0741)  Pulse: 86 (07/29/19 0743)  Resp: 18 (07/29/19 0741)  BP: 106/67 (07/29/19 0741)  SpO2: 97 % (07/29/19 0741)    Vital Signs (24h Range):  Temp:  [97 °F (36.1 °C)-99.1 °F (37.3 °C)] 99 °F (37.2 °C)  Pulse:  [63-95] 86  Resp:  [16-18] 18  SpO2:  [94 %-97 %] 97 %  BP: (100-118)/(63-76) 106/67     Physical Exam   Constitutional: He appears well-developed and well-nourished. No distress.   HENT:   Head: Normocephalic and atraumatic.   Right Ear: External ear normal.    Left Ear: External ear normal.   Nose: Nose normal.   Eyes: Right eye exhibits no discharge. Left eye exhibits no discharge. No scleral icterus.   Neck: Normal range of motion.   Cardiovascular: Normal rate and intact distal pulses.   Pulmonary/Chest: Effort normal. No respiratory distress. He has no wheezes.   Abdominal: Soft. He exhibits no distension. There is no tenderness.   Musculoskeletal: He exhibits no edema or tenderness.   Neurological: He is alert. A sensory deficit is present. He exhibits abnormal muscle tone.   -  Mental Status:  Awake and alert.  Follows commands.   -  Speech and language:  + aphasia, no dysarthria.    -  Motor: RUE: 0/5.  LUE: 5/5.  RLE: 1/5.  LLE: 5/5.  -  Tone:  Increased RLE.   Skin: Skin is warm and dry. No rash noted.   Psychiatric: He has a normal mood and affect. His behavior is normal.   Vitals reviewed.    Diagnostic Results:   Labs: Reviewed  X-Ray: Reviewed  CT: Reviewed  MRI: Reviewed

## 2019-07-29 NOTE — PROGRESS NOTES
[facility]  Vascular Neurology  Comprehensive Stroke Center  Progress Note    Assessment/Plan:     * Stroke due to embolism of left middle cerebral artery  48 y/o man with PMHx AFib (s/p watchman device, not on anticoagulation), prior stroke presenting with right-sided weakness and aphasia, received IV tPA and thrombectomy due to M1 occlusion with subsequent TICI 2C. L MCA stroke suspected likely due to AFib/not on anticoagulation.     D/c'd camera sitter; per bedside nurse, pt much more redirectable and with improved mental clarity today.       Antithrombotics:  Eliquis 5 mg BID  Statins: Lipitor 40 mg daily   Aggressive risk factor modification: A-Fib, alcohol  Rehab efforts: The patient has been evaluated by a stroke team provider and the therapy needs have been fully considered based off the presenting complaints and exam findings. The following therapy evaluations are needed: PT evaluate and treat, OT evaluate and treat, SLP evaluate and treat, PM&R evaluate for appropriate placement - recommending rehab   Diagnostics ordered/pending: NA  VTE prophylaxis: Eliquis 5 mg BID, mechanical SCDs  BP parameters: Infarct: Post sucessful thrombectomy, SBP <140    Acute cystitis  WBC 7 > 9 > 15; Pt having issues with urinary retention  UA 7/28 with POS Nitrites, 28 WBC (possible un-treated UTI on 7/19?)  Discussed with Hospital Medicine, appreciate recs - Complicated UTI due to pt male  Plan for Cipro 500mg BID x 10 day course (start date 7/28)    Urinary retention  Reported urinary retention and flank pain per pt and family overnight 7/26 and 7/27.  US Abd 7/24 No acute abnormality. Renal US 7/27 - No sonographic evidence of nephrolithiasis or obstructive uropathy.  Ordered stat CXR 7/29 to R/O effusion as possible cause for pt's flank pain.  Treating for complicated UTI (see above)  Continuing PRN bladder scan, straight cath; Monitoring pt for spontaneous voiding.  Also started Flomax on 7/29; Will  monitor response    Transaminitis  US Abdomen (7/23): No significant abnormality of the abdominal organs. Trace right pleural effusion.  Statin had been held, currently liver enzymes trending back down.  Restarted Atorvastatin 40mg Daily on 7/29; Will monitor response    Essential hypertension  Stroke risk factor  SBP <140   On Lisinopril 20 mg  BP at goal/low over last 24 hours; May consider decreasing dose on 7/30    Thrombocytopenia  Patient presented with normal PLT count which then dropped during hospitalization, suspected Heparin-related  92-->145-->181-->194-->215-->233-->220  Continuing to monitor platelet count daily while inpatient. Plan for CBC every 72 hrs x3 when discharged to inpatient rehab.     PAF (paroxysmal atrial fibrillation)  Afib is a Stroke risk factor  Patient has watch vargas device when he was in . His wife states that he received anticoagulation before the device placement with no reported side effects  CHADVASC score: 2; HAS BLED score: 2  ABDIAS showed Left atrial appendage with a well-seated Watchman device. No peridevice leak or thrombus visualized. No intra-atrial shunt on bubble study with positive valsalva.  Pt started on Eliquis 5 mg BID on 7/26 - Continuing to monitor platelet count   Rate currently well-controlled with use of any agent; Will continue to monitor on tele    Cytotoxic cerebral edema  Large area of cytotoxic cerebral edema identified when reviewing brain imaging in the territory of the L middle cerebral artery. There is not mass effect associated with it. We will continue to monitor the patients clinical exam for any worsening of symptoms which may indicate expansion of the stroke or the area of the edema resulting in the clinical change. The pattern is suggestive of cardioembolic etiology.    Aphasia  Due to stroke, mostly expressive  Continue aggressive therapy    Hemiparesis, right  Due to stroke  Aggressive PT/OT - Dispo rehab    Moderate  malnutrition  Nutrition following, appreciate recs  Boost pudding TID    Dysphagia  2/2 stroke   Aggressive SLP   Approved for Regular diet, Thin         7/14 admit to [facility] L MCA embolic stroke, s/p tpa, s/p thrombectomy L M1 multiple clots removed. ETOH use, monitor for withdrawals.  7/15 scheduled MRI wo contrast for tomorrow morning with planning for chemical DVT ppx. Continued 2% hypertonic saline and mannitol.   7/16 MRI wo contrast currently postponed, awaiting clarification that atrial device is safe to scan.  No acute events today. Continued 2% saline and mannitol.   7/17 MRI wo contrast preformed today, awaiting results. Plans to start anticoagulation following MRI per stroke team.   7/18 Cardiology consulted today for recs on whether to start AC or preform a ABDIAS to check potential thrombi on watchman.   7/19 Platelet count decreased below 100 while Heparin. D/c heparin and hold on any AC.  Gave lasix IV 20 to help with fluid overload. D/c 2%HTS  7/20 Platelet count still below 100.  HIT antibody panel pending.  Hold on ABDIAS until thrombocytopenia resolved.   7/21: Developing HA. Repeated CTH with no significant interval changes   7/22: ABDIAS procedure tomorrow 7/23, NPO at midnight   7/23: ABDIAS complete, no thrombus seen on exam, 81 mg started, transfer to stroke pending   7/25 - Platelet count trending up - continue to watch trend and start AC potentially tomorrow. AST/ALT trending down.   7/26 - Platelet count 194 today. Started Eliquis 5 mg BID - discussed w/ wife. Continue to monitor daily CBC while inpatient and every 72 hours when discharge to rehab.   7/27 - Platelet count 215 today. Urinary retention overnight, currently voiding spontaneously, c/o mild flank pain, ordered renal US.  7/28 - Intermittent urinary retention improving, renal US negative. Per nursing patient fell at side of bed, nursing denies LOC or hitting head, continue to monitor for changes.   7/29: WBC elevated today; extended Cipro  course per discussions with Hospital Medicine. Started Flomax for urinary retention. D/c'd camera sitter; per bedside nurse, pt much more redirectable and with improved mental clarity today. Prior echo with concern for pleural effusion; ordered stat CXR.    STROKE DOCUMENTATION   Acute Stroke Times   Last Known Normal Date: 07/13/19  Last Known Normal Time: 2130  Symptom Onset Date: 07/13/19  Symptom Onset Time: 2130  Stroke Team Called Date: 07/13/19  Stroke Team Called Time: 2245  Stroke Team Arrival Date: 07/14/19  Stroke Team Arrival Time: 0107  CT Interpretation Time: 2251  Decision to Treat Time for Alteplase: 2307(bolus given)  Decision to Treat Time for IR: 0107    NIH Scale:  1a. Level of Consciousness: 0-->Alert, keenly responsive  1b. LOC Questions: 2-->Answers neither question correctly  1c. LOC Commands: 0-->Performs both tasks correctly  2. Best Gaze: 1-->Partial gaze palsy, gaze is abnormal in one or both eyes, but forced deviation or total gaze paresis is not present  3. Visual: 2-->Complete hemianopia  4. Facial Palsy: 2-->Partial paralysis (total or near-total paralysis of lower face)  5a. Motor Arm, Left: 0-->No drift, limb holds 90 (or 45) degrees for full 10 secs  5b. Motor Arm, Right: 4-->No movement  6a. Motor Leg, Left: 0-->No drift, leg holds 30 degree position for full 5 secs  6b. Motor Leg, Right: 3-->No effort against gravity, leg falls to bed immediately  7. Limb Ataxia: 0-->Absent  8. Sensory: 1-->Mild-to-moderate sensory loss, patient feels pinprick is less sharp or is dull on the affected side, or there is a loss of superficial pain with pinprick, but patient is aware of being touched  9. Best Language: 1-->Mild-to-moderate aphasia, some obvious loss of fluency or facility of comprehension, without significant limitation on ideas expressed or form of expression. Reduction of speech and/or comprehension, however, makes conversation. . . (see row details)  10. Dysarthria:  1-->Mild-to-moderate dysarthria, patient slurs at least some words and, at worst, can be understood with some difficulty  11. Extinction and Inattention (formerly Neglect): 1-->Visual, tactile, auditory, spatial, or personal inattention or extinction to bilateral simultaneous stimulation in one of the sensory modalities  Total (NIH Stroke Scale): 18       Modified Patrick Score: 0  [place] Coma Scale:    ABCD2 Score:    [id number] Score:2  HAS -BLED Score:2  ICH Score:   Hunt & Ta Classification:      Hemorrhagic change of an Ischemic Stroke: Does this patient have an ischemic stroke with hemorrhagic changes? Yes, Grading Scale: HI Type 1 (HI-1) = small petechiae along the margins of the infarct. Is this a symptomatic change?  No - Hemorrhage is not clinically significant     Neurologic Chief Complaint: Aphasia, R-sided weakness -- L MCA    Subjective:     Interval History: Patient is seen for follow-up neurological assessment and treatment recommendations:  NAEON. WBC elevated today; extended Cipro course per discussions with Hospital Medicine. Started Flomax for urinary retention. D/c'd camera sitter; per bedside nurse, pt much more redirectable and with improved mental clarity today. Prior echo with concern for pleural effusion; ordered stat CXR.    HPI, Past Medical, Family, and Social History remains the same as documented in the initial encounter.     Review of Systems   Constitutional: Positive for fatigue. Negative for fever.   Genitourinary: Positive for difficulty urinating. Negative for frequency.   Musculoskeletal: Positive for gait problem. Negative for neck stiffness.   Neurological: Positive for facial asymmetry, speech difficulty and weakness.   Psychiatric/Behavioral: Positive for confusion. Negative for agitation.     Scheduled Meds:   apixaban  5 mg Oral BID    atorvastatin  40 mg Oral Daily    ciprofloxacin HCl  500 mg Oral Q12H    FLUoxetine  20 mg Oral Daily    lisinopril  20 mg Oral  Daily    senna-docusate 8.6-50 mg  1 tablet Oral BID    tamsulosin  0.4 mg Oral Daily     Continuous Infusions:  PRN Meds:acetaminophen, atropine, hydrALAZINE, ondansetron, sodium chloride 0.9%    Objective:     Vital Signs (Most Recent):  Temp: 97.7 °F (36.5 °C) (07/29/19 1136)  Pulse: 80 (07/29/19 1136)  Resp: 18 (07/29/19 1136)  BP: 98/60 (07/29/19 1136)  SpO2: 96 % (07/29/19 1136)  BP Location: Left arm    Vital Signs Range (Last 24H):  Temp:  [97.7 °F (36.5 °C)-99.1 °F (37.3 °C)]   Pulse:  [63-95]   Resp:  [16-18]   BP: ()/(60-76)   SpO2:  [94 %-97 %]   BP Location: Left arm    Physical Exam   Constitutional: He appears well-developed and well-nourished. No distress.   HENT:   Head: Normocephalic and atraumatic.   Eyes: Conjunctivae are normal. No scleral icterus.   Cardiovascular: Normal rate.   Pulmonary/Chest: Effort normal. No respiratory distress.   Musculoskeletal: He exhibits no tenderness or deformity.   Neurological: He is alert. A sensory deficit is present. He exhibits abnormal muscle tone.   Skin: Skin is warm and dry.   Psychiatric: Cognition and memory are impaired. He is attentive.   Vitals reviewed.      Neurological Exam:   LOC: alert  Attention Span: Good   Language: Expressive aphasia  Articulation: Dysarthria  Orientation: Not oriented to place, Not oriented to time  Visual Fields: Hemianopsia right  EOM (CN III, IV, VI): Gaze preference  left  Facial Movement (CN VII): Lower facial weakness on the Right  Motor: Arm left  Normal 5/5  Leg left  Normal 5/5  Arm right  Plegia 0/5  Leg right Paresis: 1/5  Sensation: Merrill-hypoesthesia right  Tone: Flaccid  RUE    Laboratory:  CMP:   No results for input(s): GLUCOSE, CALCIUM, ALBUMIN, PROT, NA, K, CO2, CL, BUN, CREATININE, ALKPHOS, ALT, AST, BILITOT in the last 24 hours.  BMP:   Recent Labs   Lab 07/28/19  0345      K 4.3      CO2 26   BUN 11   CREATININE 0.8   CALCIUM 8.7     CBC:   Recent Labs   Lab 07/29/19  0458   WBC  15.33*   RBC 4.23*   HGB 13.3*   HCT 39.6*      MCV 94   MCH 31.4*   MCHC 33.6     Lipid Panel: No results for input(s): CHOL, LDLCALC, HDL, TRIG in the last 168 hours.  Coagulation:   No results for input(s): PT, INR, APTT in the last 168 hours.  Platelet Aggregation Study: No results for input(s): PLTAGG, PLTAGINTERP, PLTAGREGLACO, ADPPLTAGGREG in the last 168 hours.  Hgb A1C: No results for input(s): HGBA1C in the last 168 hours.  TSH: No results for input(s): TSH in the last 168 hours.      Diagnostic Results     Brain Imaging     CT Head 7/21/19  1. Evolving large left MCA distribution infarct with mild associated mass effect contributing to 5 mm of rightward midline shift, similar to slightly increased when compared to the previous exam.  Subtle scattered areas of hyperattenuation suggesting superimposed petechial hemorrhages, better evaluated on previous MRI.    MRI Brain WO Contrast 7/17/19  Multifocal moderate to large regions of acute/recent infarction primarily left MCA territory.  There is superimposed petechial regions of hemorrhage within the left basal ganglia, insula and frontal and parietal lobes.  Please note there is superimposed small region of additional recent infarction and petechial hemorrhage in the left occipital lobe with moderate-sized remote posterior temporal infarct.  Localized mass effect without midline shift or hydrocephalus.  Clinical correlation and follow-up advised.      CT Head 7/15/19  Evolving infarct in the territory of left MCA territory with associated edema/mass effect and left-to-right subfalcine herniation.  No hemorrhagic conversion or new major vascular distribution infarct.  Remote infarct in the left parietal lobe.    CT Head 7/14/19  Changes consistent with acute infarction in the left MCA territory with associated diffuse cytotoxic edema and left to right subfalcine herniation.  Continued short-term follow-up is suggested.    CT Head 7/14/19  Chronic  changes are noted there is no evidence for superimposed acute intracranial process.    CT Head 7/13/19 -- from [facility]  No acute intracranial findings.      Vessel Imaging     CTA Multiphase 7/13/19  Complete occlusion of the left M1 segment origin with some reconstitution of distal cortical branches through collateral flow.    Cerebral Angiogram 7/14/19  Cerebral angiography demonstrated left MCA origin occlusion.  This was treated with mechanical embolectomy using a aspiration technique, as above.  There was TICI 2C reperfusion.      Cardiac Imaging     ABDIAS 7/23/19  · Left atrial appendage with a well seated 21mm Watchman device. No peridevice leak on color doppler. No thrombus visualized.  · No intra-atrial shunt on bubble study with positive valsalva  · No Valvular thrombus, mass or vegation visualized  · Normal left ventricular systolic function. The estimated ejection fraction is 55%  · Normal right ventricular systolic function.  · Moderate size Left pleural effusion    TTE 7/14/19  · Normal left ventricular systolic function. The estimated ejection fraction is 60%.  · Normal LV diastolic function.  · Normal right ventricular systolic function.  · Mild tricuspid regurgitation.  · Intermediate central venous pressure (8 mm Hg).  · The estimated PA systolic pressure is 33 mm Hg  · Normal L atrium.      Daisy Ibrhaim PA-C  Comprehensive Stroke Center  Department of Vascular Neurology   [facility]

## 2019-07-29 NOTE — ASSESSMENT & PLAN NOTE
Patient presented with normal PLT count which then dropped during hospitalization, suspected Heparin-related  92-->145-->181-->194-->215-->233-->220  Continuing to monitor platelet count daily while inpatient. Plan for CBC every 72 hrs x3 when discharged to inpatient rehab.

## 2019-07-29 NOTE — NURSING
approx 0100 pt spouse called  stating that pt was nauseated, I went in to give him an emesis bag and assess. He was shaking and visibly uncomfortable. I went to pull zofran and when io came back his wife stated that he had to pee and that the retention was causing discomfort. I administered zofran and then bladder scanned and straight cathed pt. Bladder scan showed >145- I straight cathed and got 100 ml out, pt urine was cloudy with sediment.Pt had also saturated incontinence pad. I bladder scanned post cath and it showed >40. Pt seemed to be more comfortable. WCTM.

## 2019-07-29 NOTE — PT/OT/SLP DISCHARGE
Occupational Therapy Discharge Summary    Justyn Rawls  MRN: [id number]   Principal Problem: Stroke due to embolism of left middle cerebral artery      Patient Discharged from acute Occupational Therapy on 7/29.  Please refer to prior OT note for functional status.    Assessment:      Patient appropriate for care in another setting.    Objective:     GOALS:   Multidisciplinary Problems     Occupational Therapy Goals        Problem: Occupational Therapy Goal    Goal Priority Disciplines Outcome Interventions   Occupational Therapy Goal     OT, PT/OT     Description:  Goals set 7/25 to be addressed in 14 days, expiring 8/8:   Patient will increase functional independence with ADLs by performing:    UE Dressing with Minimal Assistance with hemiplegic technique.  LE Dressing with Minimal Assistance.  Grooming while standing with Contact Guard Assistance.   Toileting from bedside commode with Minimal Assistance for hygiene and clothing management.   Rolling to Left with Contact Guard Assistance.   Rolling to Right with Supervision.   Stand pivot transfers with Contact Guard Assistance.  Patient/Caregivers will be independent in assisting in bed mobility/positioning  Patient/Caregivers will be independent in HEP for weightbearing with RUE, PROM of RUE.                         Reasons for Discontinuation of Therapy Services  Transfer to alternate level of care.      Plan:     Patient Discharged to: Inpatient Rehab    JONNY Rosario  7/29/2019

## 2019-07-29 NOTE — PT/OT/SLP PROGRESS
"Speech Language Pathology Treatment    Patient Name:  Justyn Rawls   MRN:  [id number]  Admitting Diagnosis: Stroke due to embolism of left middle cerebral artery    Recommendations:                 General Recommendations:  Dysphagia therapy, Speech/language therapy and Cognitive-linguistic therapy  Diet recommendations:  Regular, Liquid Diet Level: Thin   Aspiration Precautions: Strict aspiration precautions   General Precautions: Standard, aspiration, fall, aphasia  Communication strategies:  provide increased time to answer and go to room if call light pushed    Subjective     "Yeah, almaxht."     Pain/Comfort:  · Pain Rating 1: (number not provided)  · Location 1: head  · Pain Addressed 1: Distraction, Nurse notified  · Pain Rating Post-Intervention 1: (continued headache)    Objective:     Has the patient been evaluated by SLP for swallowing?   Yes  Keep patient NPO? No   Current Respiratory Status: room air      Pt seen bedside with family present.  Pt alert and cooperative though c/o headache.  Nursing notified.  Simple commands followed with 80% accy given min A.  Pt unable to follow 2 step commands despite max cues.  Some groping behaviors concerning for apraxia evident.  Simple y/n questions answered with 70% accy.  Min cues required to initiate auto speech tasks 2/2 perseverations.  Pt counted 1-10, stated valerio and months of year given min cues.  Confrontation naming completed with 100% given mod cues including phonemic cues and carrier phrases.  SLP provided ed re: ongoing SLP POC, appropriate language stim, communication strategies and progress made.  Family verbalized understanding.            Assessment:     Justyn Rawls is a 49 y.o. male with an SLP diagnosis of Aphasia, Dysphagia and Apraxia.      Goals:   Multidisciplinary Problems     SLP Goals        Problem: SLP Goal    Goal Priority Disciplines Outcome   SLP Goal     SLP Ongoing (interventions implemented as appropriate)   Description:  " Speech Language Pathology Goals  Updated Goals expected to be met by 8/5    Updated goals:   1. Pt will participate in simple visual scanning tasks with 80% accuracy and mod cues.-met  2. Pt will complete OMEs x10 with SLP and independently   3. Pt will complete orientation tasks with 80% accuracy and mod cues.  4. Pt will complete simple naming tasks with 50% accuracy and mod cues.   5. Pt will follow simple two step commands with 50% accuracy and mod cues.  6. Pt will complete automatic speech tasks with 80% accuracy and mod cues.  7. Pt will answer Y/N questions with 60% accuracy and mod cues.  8. Pt will participate in ongoing assessment of reading and writing skills.                           Plan:     · Patient to be seen:  4 x/week   · Plan of Care expires:     · Plan of Care reviewed with:  patient, spouse   · SLP Follow-Up:  Yes       Discharge recommendations:  rehabilitation facility   Barriers to Discharge:  Level of Skilled Assistance Needed      Time Tracking:     SLP Treatment Date:   07/29/19  Speech Start Time:  0955  Speech Stop Time:  1018     Speech Total Time (min):  23 min    Billable Minutes: Speech Therapy Individual 15 and Seld Care/Home Management Training 8    ARTEMIO Shafer, CCC-SLP  07/29/2019

## 2019-07-29 NOTE — ASSESSMENT & PLAN NOTE
Afib is a Stroke risk factor  Patient has watch vargas device when he was in . His wife states that he received anticoagulation before the device placement with no reported side effects  CHADVASC score: 2; HAS BLED score: 2  ABDIAS showed Left atrial appendage with a well-seated Watchman device. No peridevice leak or thrombus visualized. No intra-atrial shunt on bubble study with positive valsalva.  Pt started on Eliquis 5 mg BID on 7/26 - Continuing to monitor platelet count   Rate currently well-controlled with use of any agent; Will continue to monitor on tele

## 2019-07-29 NOTE — SUBJECTIVE & OBJECTIVE
Neurologic Chief Complaint: Aphasia, R-sided weakness -- L MCA    Subjective:     Interval History: Patient is seen for follow-up neurological assessment and treatment recommendations:  NAEON. WBC elevated today; extended Cipro course per discussions with Hospital Medicine. Started Flomax for urinary retention. D/c'd camera sitter; per bedside nurse, pt much more redirectable and with improved mental clarity today. Prior echo with concern for pleural effusion; ordered stat CXR.    HPI, Past Medical, Family, and Social History remains the same as documented in the initial encounter.     Review of Systems   Constitutional: Positive for fatigue. Negative for fever.   Genitourinary: Positive for difficulty urinating. Negative for frequency.   Musculoskeletal: Positive for gait problem. Negative for neck stiffness.   Neurological: Positive for facial asymmetry, speech difficulty and weakness.   Psychiatric/Behavioral: Positive for confusion. Negative for agitation.     Scheduled Meds:   apixaban  5 mg Oral BID    atorvastatin  40 mg Oral Daily    ciprofloxacin HCl  500 mg Oral Q12H    FLUoxetine  20 mg Oral Daily    lisinopril  20 mg Oral Daily    senna-docusate 8.6-50 mg  1 tablet Oral BID    tamsulosin  0.4 mg Oral Daily     Continuous Infusions:  PRN Meds:acetaminophen, atropine, hydrALAZINE, ondansetron, sodium chloride 0.9%    Objective:     Vital Signs (Most Recent):  Temp: 97.7 °F (36.5 °C) (07/29/19 1136)  Pulse: 80 (07/29/19 1136)  Resp: 18 (07/29/19 1136)  BP: 98/60 (07/29/19 1136)  SpO2: 96 % (07/29/19 1136)  BP Location: Left arm    Vital Signs Range (Last 24H):  Temp:  [97.7 °F (36.5 °C)-99.1 °F (37.3 °C)]   Pulse:  [63-95]   Resp:  [16-18]   BP: ()/(60-76)   SpO2:  [94 %-97 %]   BP Location: Left arm    Physical Exam   Constitutional: He appears well-developed and well-nourished. No distress.   HENT:   Head: Normocephalic and atraumatic.   Eyes: Conjunctivae are normal. No scleral icterus.    Cardiovascular: Normal rate.   Pulmonary/Chest: Effort normal. No respiratory distress.   Musculoskeletal: He exhibits no tenderness or deformity.   Neurological: He is alert. A sensory deficit is present. He exhibits abnormal muscle tone.   Skin: Skin is warm and dry.   Psychiatric: Cognition and memory are impaired. He is attentive.   Vitals reviewed.      Neurological Exam:   LOC: alert  Attention Span: Good   Language: Expressive aphasia  Articulation: Dysarthria  Orientation: Not oriented to place, Not oriented to time  Visual Fields: Hemianopsia right  EOM (CN III, IV, VI): Gaze preference  left  Facial Movement (CN VII): Lower facial weakness on the Right  Motor: Arm left  Normal 5/5  Leg left  Normal 5/5  Arm right  Plegia 0/5  Leg right Paresis: 1/5  Sensation: Merrill-hypoesthesia right  Tone: Flaccid  RUE    Laboratory:  CMP:   No results for input(s): GLUCOSE, CALCIUM, ALBUMIN, PROT, NA, K, CO2, CL, BUN, CREATININE, ALKPHOS, ALT, AST, BILITOT in the last 24 hours.  BMP:   Recent Labs   Lab 07/28/19  0345      K 4.3      CO2 26   BUN 11   CREATININE 0.8   CALCIUM 8.7     CBC:   Recent Labs   Lab 07/29/19  0458   WBC 15.33*   RBC 4.23*   HGB 13.3*   HCT 39.6*      MCV 94   MCH 31.4*   MCHC 33.6     Lipid Panel: No results for input(s): CHOL, LDLCALC, HDL, TRIG in the last 168 hours.  Coagulation:   No results for input(s): PT, INR, APTT in the last 168 hours.  Platelet Aggregation Study: No results for input(s): PLTAGG, PLTAGINTERP, PLTAGREGLACO, ADPPLTAGGREG in the last 168 hours.  Hgb A1C: No results for input(s): HGBA1C in the last 168 hours.  TSH: No results for input(s): TSH in the last 168 hours.      Diagnostic Results     Brain Imaging     CT Head 7/21/19  1. Evolving large left MCA distribution infarct with mild associated mass effect contributing to 5 mm of rightward midline shift, similar to slightly increased when compared to the previous exam.  Subtle scattered areas of  hyperattenuation suggesting superimposed petechial hemorrhages, better evaluated on previous MRI.    MRI Brain WO Contrast 7/17/19  Multifocal moderate to large regions of acute/recent infarction primarily left MCA territory.  There is superimposed petechial regions of hemorrhage within the left basal ganglia, insula and frontal and parietal lobes.  Please note there is superimposed small region of additional recent infarction and petechial hemorrhage in the left occipital lobe with moderate-sized remote posterior temporal infarct.  Localized mass effect without midline shift or hydrocephalus.  Clinical correlation and follow-up advised.      CT Head 7/15/19  Evolving infarct in the territory of left MCA territory with associated edema/mass effect and left-to-right subfalcine herniation.  No hemorrhagic conversion or new major vascular distribution infarct.  Remote infarct in the left parietal lobe.    CT Head 7/14/19  Changes consistent with acute infarction in the left MCA territory with associated diffuse cytotoxic edema and left to right subfalcine herniation.  Continued short-term follow-up is suggested.    CT Head 7/14/19  Chronic changes are noted there is no evidence for superimposed acute intracranial process.    CT Head 7/13/19 -- from [facility]  No acute intracranial findings.      Vessel Imaging     CTA Multiphase 7/13/19  Complete occlusion of the left M1 segment origin with some reconstitution of distal cortical branches through collateral flow.    Cerebral Angiogram 7/14/19  Cerebral angiography demonstrated left MCA origin occlusion.  This was treated with mechanical embolectomy using a aspiration technique, as above.  There was TICI 2C reperfusion.      Cardiac Imaging     ABDIAS 7/23/19  · Left atrial appendage with a well seated 21mm Watchman device. No peridevice leak on color doppler. No thrombus visualized.  · No intra-atrial shunt on bubble study with positive valsalva  · No Valvular thrombus, mass or  vegation visualized  · Normal left ventricular systolic function. The estimated ejection fraction is 55%  · Normal right ventricular systolic function.  · Moderate size Left pleural effusion    TTE 7/14/19  · Normal left ventricular systolic function. The estimated ejection fraction is 60%.  · Normal LV diastolic function.  · Normal right ventricular systolic function.  · Mild tricuspid regurgitation.  · Intermediate central venous pressure (8 mm Hg).  · The estimated PA systolic pressure is 33 mm Hg  · Normal L atrium.

## 2019-07-29 NOTE — PLAN OF CARE
07/29/19 1244   Post-Acute Status   Post-Acute Authorization Placement   Post-Acute Placement Status Awaiting Internal Medical Clearance       Once medically cleared patient will be able to go to [facility].  SW in contact with CM and Medical staff. Will continue to follow and offer support as needed.     Ayaan Flores, MYLENE  [facility]   Ext. [phone]

## 2019-07-29 NOTE — PLAN OF CARE
Problem: Occupational Therapy Goal  Goal: Occupational Therapy Goal  Goals set 7/25 to be addressed in 14 days, expiring 8/8:   Patient will increase functional independence with ADLs by performing:    UE Dressing with Minimal Assistance with hemiplegic technique.  LE Dressing with Minimal Assistance.  Grooming while standing with Contact Guard Assistance.   Toileting from bedside commode with Minimal Assistance for hygiene and clothing management.   Rolling to Left with Contact Guard Assistance.   Rolling to Right with Supervision.   Stand pivot transfers with Contact Guard Assistance.  Patient/Caregivers will be independent in assisting in bed mobility/positioning  Patient/Caregivers will be independent in HEP for weightbearing with RUE, PROM of RUE.        Goals remain appropriate.  JONNY Rosario  7/29/2019

## 2019-07-29 NOTE — NURSING
Pt resting comfortably, given complete bath. Passing gas, might be part of abd discomfort earlier. No current c/o pain/discomfort.

## 2019-07-29 NOTE — PT/OT/SLP PROGRESS
"Occupational Therapy   Treatment    Name: Justyn Rawls  MRN: [id number]  Admitting Diagnosis:  Stroke due to embolism of left middle cerebral artery  6 Days Post-Op    Recommendations:     Discharge Recommendations: rehabilitation facility  Discharge Equipment Recommendations:  shower chair  Barriers to discharge:  Inaccessible home environment    Assessment:     Justyn Rawls is a 49 y.o. male with a medical diagnosis of Stroke due to embolism of left middle cerebral artery.  He presents with performance deficits affecting function are weakness, impaired sensation, impaired functional mobilty, impaired balance, impaired cognition, impaired endurance, impaired self care skills, gait instability, decreased coordination, decreased lower extremity function, decreased upper extremity function, decreased safety awareness, impaired coordination, decreased ROM, abnormal tone, impaired fine motor.     Rehab Prognosis:  Good; patient would benefit from acute skilled OT services to address these deficits and reach maximum level of function.       Plan:     Patient to be seen 4 x/week to address the above listed problems via self-care/home management, therapeutic activities, therapeutic exercises, neuromuscular re-education, cognitive retraining  · Plan of Care Expires: 08/13/19  · Plan of Care Reviewed with: patient, spouse    Subjective   Patient:  "Can you raise the rail?"  Pain/Comfort:  · Pain Rating 1: 0/10  · Pain Rating Post-Intervention 1: 0/10    Objective:     Communicated with: Nurse prior to session.  Patient found supine with peripheral IV, bed alarm, telemetry upon OT entry to room.  Wife present.    General Precautions: Standard, aspiration, aphasia, fall   Orthopedic Precautions:N/A   Braces: N/A     Occupational Performance:     Bed Mobility:    · Patient completed Rolling/Turning to Left with  minimum assistance  · Patient completed Rolling/Turning to Right with stand by assistance  · Patient completed " Scooting/Bridging with contact guard assistance  · Patient completed Supine to Sit with contact guard assistance  · Patient completed Sit to Supine with minimum assistance     Functional Mobility/Transfers:  · Patient completed Sit <> Stand Transfer with minimum assistance  with  no assistive device   · Patient completed Bed <> Chair Transfer using Stand Pivot technique with moderate assistance with no assistive device    Activities of Daily Living:  · Grooming: minimum assistance while standing  · Bathing: moderate assistance while seated EOB    [facility] 6 Click ADL: 13    Treatment & Education:  Patient education provided on role of OT and need for rehab upon discharge.  Patient education provided on hemiplegic dressing technique, right UE weight bearing / positioning, postural control, and transfers.  Continued education, patient/ family training recommended. Patient alert; daily orientation provided. PROM performed right UE one set x 10 rep in all planes of motion with stretches provided at end range; sustained stretch provided for external rotation.  Assistance and facilitation provided for upward rotation of the scapula during shoulder flexion and abduction. Addressed right UE weight bearing while seated EOB.  SBA with postural control while seated EOB; Min assist while standing.  Patient able to follow 4/5 one step commands.  Able to model 3/3 commands.  Able to name 1/3 objects.  Able to sequence 6/7 days of the week and 11/12 months of the year. Positioning provided for midline orientation with bilateral UEs elevated and heels lifted off mattress.   White board updated in patient's room.     Patient left supine with all lines intact, call button in reach and bed alarm onEducation:      GOALS:   Multidisciplinary Problems     Occupational Therapy Goals        Problem: Occupational Therapy Goal    Goal Priority Disciplines Outcome Interventions   Occupational Therapy Goal     OT, PT/OT     Description:  Goals  set 7/25 to be addressed in 14 days, expiring 8/8:   Patient will increase functional independence with ADLs by performing:    UE Dressing with Minimal Assistance with hemiplegic technique.  LE Dressing with Minimal Assistance.  Grooming while standing with Contact Guard Assistance.   Toileting from bedside commode with Minimal Assistance for hygiene and clothing management.   Rolling to Left with Contact Guard Assistance.   Rolling to Right with Supervision.   Stand pivot transfers with Contact Guard Assistance.  Patient/Caregivers will be independent in assisting in bed mobility/positioning  Patient/Caregivers will be independent in HEP for weightbearing with RUE, PROM of RUE.                         Time Tracking:     OT Date of Treatment: 07/29/19  OT Start Time: 0724  OT Stop Time: 0755  OT Total Time (min): 31 min    Billable Minutes:Self Care/Home Management 22  Cognitive Retraining 9    JONNY Rosario  7/29/2019

## 2019-07-29 NOTE — PLAN OF CARE
Problem: SLP Goal  Goal: SLP Goal  Speech Language Pathology Goals  Updated Goals expected to be met by 8/5    Updated goals:   1. Pt will participate in simple visual scanning tasks with 80% accuracy and mod cues.-met  2. Pt will complete OMEs x10 with SLP and independently   3. Pt will complete orientation tasks with 80% accuracy and mod cues.  4. Pt will complete simple naming tasks with 50% accuracy and mod cues.   5. Pt will follow simple two step commands with 50% accuracy and mod cues.  6. Pt will complete automatic speech tasks with 80% accuracy and mod cues.  7. Pt will answer Y/N questions with 60% accuracy and mod cues.  8. Pt will participate in ongoing assessment of reading and writing skills.          Outcome: Ongoing (interventions implemented as appropriate)  Goals remain appropriate, cont POC. ARTEMIO Shafer, CCC/SLP  7/29/2019

## 2019-07-29 NOTE — ASSESSMENT & PLAN NOTE
48 y/o man with PMHx AFib (s/p watchman device, not on anticoagulation), prior stroke presenting with right-sided weakness and aphasia, received IV tPA and thrombectomy due to M1 occlusion with subsequent TICI 2C. L MCA stroke suspected likely due to AFib/not on anticoagulation.     D/c'd camera sitter; per bedside nurse, pt much more redirectable and with improved mental clarity today.       Antithrombotics:  Eliquis 5 mg BID  Statins: Lipitor 40 mg daily   Aggressive risk factor modification: A-Fib, alcohol  Rehab efforts: The patient has been evaluated by a stroke team provider and the therapy needs have been fully considered based off the presenting complaints and exam findings. The following therapy evaluations are needed: PT evaluate and treat, OT evaluate and treat, SLP evaluate and treat, PM&R evaluate for appropriate placement - recommending rehab   Diagnostics ordered/pending: NA  VTE prophylaxis: Eliquis 5 mg BID, mechanical SCDs  BP parameters: Infarct: Post sucessful thrombectomy, SBP <140

## 2019-07-29 NOTE — PT/OT/SLP PROGRESS
"Physical Therapy Treatment    Patient Name: Justyn Rawls  MRN: [id number]   Diagnosis: Stroke due to embolism of left middle cerebral artery    Recommendations:     Discharge Recommendations:  rehabilitation facility   Discharge Equipment Recommendations: (TBD)   Barriers to Discharge: decreased caregiver assistance, increased burden of care, fall risk, therapy needs    Assessment:   Justyn Rawls is a 49 y.o. male admitted with a medical diagnosis of Stroke due to embolism of left middle cerebral artery.  He participated well in therapy despite c/o from broken sleep last night.  He requires verbal and tactile cues to place RUE and RLE prior to transfers.  RLE flexor spasticity is further increasing his instability during transfers.  Pt participated well in transfer and pre-gait training.  OOB mobility.  Proximal R hip activation was palpable during weight bearing, but R knee dolly when receiving weight. He is expected to require custom R AFO to advance to functional gait training.  PT POC and goals remain appropriate.       Problem List:  weakness, impaired functional mobilty, decreased safety awareness, impaired coordination, impaired endurance, gait instability, impaired fine motor, impaired balance, decreased upper extremity function, impaired self care skills, impaired joint extensibility, abnormal tone, decreased lower extremity function, visual deficits  Rehab Prognosis:  Fair to good; patient would benefit from acute skilled PT services to address these deficits and reach maximum level of function.      Subjective   PT communicated with RN prior to therapy.     Patient comments/goals: "He had a really bad night last night. We didn't sleep that much"  Pain/Comfort:  · Pain Rating 1: 0/10  · Pain Rating Post-Intervention 1: 0/10    Recent Vital Signs: (Last documentation)  Pulse: 86 (07/29/19 0743)  BP: 106/67 (07/29/19 0741)  SpO2: 97 % (07/29/19 0741)     Objective:   General Precautions: aspiration, " fall, aphasia  Recent Surgery: Procedure(s) (LRB):  ECHOCARDIOGRAM, TRANSESOPHAGEAL (N/A)    The patient currently has telemetry.    The patient was found supine in bed in NAD with 4 bedrails up.  He agreed to therapy.  Rolling L using natacha technique with supervision.  Supine to sit from L sidelying using natacha technique with CGA.  Sitting balance SBA with bilateral UE support, min assistance with no UE support. PT instructed and assisted patient to don/doff gown using natacha technique.  Bed to chair transfer to the L moderate assistance with PT blocking RLE and supporting RUE.  Transfer training and pre-gait training was performed in the [place] with unilateral stationary railing.  He was returned to bed following therapy per patient request.  Bed alarm armed and wife at bedside. See below for details.     Functional Mobility:  Transfers:   · Sit <> Stand Transfer:  5 trials moderate assistance with PT pre-positioning and blocking R knee for functional use.   · R hamstring spasticity resulted in R knee flexion moment during transition and increased instability    · Compensatory L weight shift during all transition movements, ineffective at maintaining balance  · Bed <> Chair Transfer: moderate assistance x 2 trials to the L side, PT blocking L knee and assisting with pelvic stability     Gait: L stationary hand rail  · Lateral weight shifting with moderate assistance for RLE stability in weight acceptance  · LLE forward/backward stepping 10x to increase weight bearing and weight acceptance through RLE (moderate assistance for RLE stability  · LLE lateral stepping 10x to increase weight bearing and weight acceptance through RLE  · L knee dolly or hyperextends during weight acceptance, palpable muscle contractions at proximal hip musculature     Therapeutic Activities, Education, or Exercises:  Therapist educated the patient and his wife on the role of PT, POC, progress with mobility, goals, discharge planning, and level  of assistance with transfers and Importance of progressive mobilization, out of bed positioning, and participation in therapy.  The therapist discussed the patients current mobility status, deficits, and level of assistance with RN.  Time was also provided for active listening,  therapeutic counseling and discussion of health disposition. The therapist answered questions to patient/familys satisfaction within scope of practice.   White board updated to reflect current level of assistance.     FUNCTIONAL OUTCOME MEASURES:  AMPAC:  Turning over in bed (including adjusting bedclothes, sheets and blankets)?: 3  Sitting down on and standing up from a chair with arms (e.g., wheelchair, bedside commode, etc.): 2  Moving from lying on back to sitting on the side of the bed?: 3  Moving to and from a bed to a chair (including a wheelchair)?: 2  Need to walk in hospital room?: 2  Climbing 3-5 steps with a railing?: 1  Basic Mobility Total Score: 13    Goals:     Multidisciplinary Problems     Physical Therapy Goals        Problem: Physical Therapy Goal    Goal Priority Disciplines Outcome Goal Variances Interventions   Physical Therapy Goal     PT, PT/OT Ongoing (interventions implemented as appropriate)     Description:  PT goals until 8/2/19    1. Pt supine to sit with minimal assist- MET 7/19      Updated: Pt will perform supine to sit with supervision   2. Pt sit to supine with minimal assist-MET  - Pt will perform sit to supine with SBA using natacha technique  3. Pt sit to stand with LRAD with minimal assist-not met  4. Pt to perform gait 20ft with LRAD with max assist.-not met  5. Pt to go up/down curb step with LRAD with max assist.-not met  6. Pt to transfer bed to/from bedside chair with moderate assist.-MET 7/25     Updated: Pt will perform bed to chair transfer with contact guard assistance   7. Pt to perform B LE exs in sitting or supine x 20 reps to strengthen B LE to improve functional mobility.-not met  8. Pt  will propel wheelchair x 100 feet with minimal assistance                            Plan:   During this hospitalization, patient to be seen 4 x/week to address their physical therapy related impairments and improve their overall level of function.   · Plan of Care Expires: 08/13/19   Plan of Care Reviewed with: patient, spouse    This plan of care has been discussed with the patient and/or family who were involved in its development and are in agreement with the identified goals and treatment plan.     Time Tracking:     PT Received On:  07/29/19  PT Start Time:   0913    PT Stop Time:  0942  PT Total Time (min): 29 min     Billable Minutes: Therapeutic Activity 15 and Neuromuscular Re-education 14     Audrey Olson, PT  7/29/2019  [phone] (pager)

## 2019-07-29 NOTE — ASSESSMENT & PLAN NOTE
Stroke risk factor  SBP <140   On Lisinopril 20 mg  BP at goal/low over last 24 hours; May consider decreasing dose on 7/30

## 2019-07-30 NOTE — PLAN OF CARE
07/30/19 0740   Final Note   Assessment Type Final Discharge Note   Anticipated Discharge Disposition Rehab  ([facility])   Right Care Referral Info   Post Acute Recommendation IRF

## 2019-07-30 NOTE — ASSESSMENT & PLAN NOTE
50 y/o man with PMHx AFib (s/p watchman device, not on anticoagulation), prior stroke presenting with right-sided weakness and aphasia, received IV tPA and thrombectomy due to M1 occlusion with subsequent TICI 2C. L MCA stroke suspected likely due to AFib/not on anticoagulation. Ambulatory referral to EP ordered at discharge to consider possible ablation.    D/c'd camera sitter; per bedside nurse, pt much more redirectable and with improved mental clarity. Pt accepted and discharged to rehab.      Antithrombotics:  Eliquis 5 mg BID  Statins: Lipitor 40 mg daily   Aggressive risk factor modification: A-Fib, alcohol  Rehab efforts: [facility] inpatient rehab   BP parameters: Infarct: Post sucessful thrombectomy, SBP <140
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
## 2019-07-30 NOTE — ASSESSMENT & PLAN NOTE
Reported urinary retention and flank pain per pt and family overnight 7/26 and 7/27.  US Abd 7/24 No acute abnormality. Renal US 7/27 - No sonographic evidence of nephrolithiasis or obstructive uropathy.  Treating for complicated UTI (see that section)  Continuing PRN bladder scan, straight cath; Monitoring pt for spontaneous voiding.  Also started Flomax on 7/29; Will monitor for response vs resolution in the setting of UTI treatment.

## 2019-07-30 NOTE — ASSESSMENT & PLAN NOTE
Patient presented with normal PLT count which then dropped during hospitalization, suspected Heparin-related.  92-->145-->181-->194-->215-->233-->220  Continuing to monitor platelet count daily while inpatient. Plan for CBC Daily x3 when discharged to inpatient rehab.

## 2019-07-30 NOTE — ASSESSMENT & PLAN NOTE
Stroke risk factor  SBP <140   Pt was on Lisinopril 20 mg Daily; Decreased to 10mg Daily at discharge as BP somewhat low over last 24 hrs.  PCP follow-up

## 2019-07-30 NOTE — ASSESSMENT & PLAN NOTE
Large area of cytotoxic cerebral edema identified when reviewing brain imaging in the territory of the L middle cerebral artery. There is not mass effect associated with it.   The patients clinical exam remained without any worsening of symptoms which may indicate expansion of the stroke or the area of the edema resulting in the clinical change. The pattern is suggestive of cardioembolic etiology.

## 2019-07-30 NOTE — ASSESSMENT & PLAN NOTE
US Abdomen (7/23): No significant abnormality of the abdominal organs. Trace right pleural effusion.  Statin had been held, currently liver enzymes trending back down.  Restarted Atorvastatin 40mg Daily on 7/29  IP rehab consider recheck CMP in 1 week

## 2019-07-30 NOTE — ASSESSMENT & PLAN NOTE
WBC 7 > 9 > 15; Pt having issues with urinary retention  UA 7/28 with POS Nitrites, 28 WBC (possible un-treated UTI on 7/19?)  Discussed with Hospital Medicine, appreciate recs - Complicated UTI due to pt being male.  Plan for Cipro 500mg BID x 10 day course (start date 7/28)

## 2019-07-30 NOTE — ASSESSMENT & PLAN NOTE
Afib is a Stroke risk factor  Patient has watchman device. His wife states that he received anticoagulation before the device placement with no reported side effects  CHADVASC score: 2; HAS BLED score: 2  ABDIAS showed Left atrial appendage with a well-seated Watchman device. No peridevice leak or thrombus visualized. No intra-atrial shunt on bubble study with positive valsalva.  Pt was started on Eliquis 5 mg BID on 7/26 - Continuing to monitor platelet count.  Rate currently well-controlled without use of any agent.  PCP/Cardiology follow-up

## 2019-08-06 ENCOUNTER — TELEPHONE (OUTPATIENT)
Dept: NEUROLOGY | Facility: CLINIC | Age: 49
End: 2019-08-06

## 2020-01-23 PROBLEM — R26.9 GAIT ABNORMALITY: Status: ACTIVE | Noted: 2020-01-23

## 2020-01-23 PROBLEM — R29.898 DECREASED STRENGTH OF LOWER EXTREMITY: Status: ACTIVE | Noted: 2020-01-23

## 2020-01-23 PROBLEM — R27.8 DECREASED COORDINATION: Status: ACTIVE | Noted: 2020-01-23

## 2020-11-10 NOTE — PROGRESS NOTES
Patient arrived to [facility] 9077/9077 A from IR. R. Side incision site, occlusive dsg, no s/s of bleeding. Pt is drowsy, oriented to self. No drips currently. Informed to keep SBP < 140. Pt to lie flat until 0410. Connected to bedside monitor - cardiac monitoring and continuous pulse oximetry applied. Call bell within reach, side rails raised x 2, bed locked and in lowest position. Primary service notified of patient arrival. Patient in no acute distress. Wife at bedside, updated on current plan of care and status. Will continue to monitor.     [FreeTextEntry1] : Continue CPAP present settings.\par Patient compliant to CPAP therapy and having positive clinical response to treatment. Continue present settings.\par \par \par Smoking cessation options and importance discussed with patient.\par CT 1 year\par \par \par get 2 night HHS for replacement machine

## 2022-06-01 PROBLEM — G45.9 TIA (TRANSIENT ISCHEMIC ATTACK): Status: ACTIVE | Noted: 2022-06-01

## 2024-02-14 NOTE — PROGRESS NOTES
"[facility]  Adult Nutrition  Progress Note    SUMMARY       Recommendations    1. Continue regular diet with Boost Plus TID. 2. Discontinue Boost Plus and add Boost Pudding TID.  Goals: Pt to meet % EEN and EPN by RD follow-up.   Nutrition Goal Status: goal met  Communication of RD Recs: other (comment)(POC)    Reason for Assessment    Reason For Assessment: RD follow-up  Diagnosis: stroke/CVA  Relevant Medical History: ETOH abuse  Interdisciplinary Rounds: attended  General Information Comments: Pt advanced to regular diet and is tolerating, but has decreased appetite and poor intake meals. Per wife, pt was experienced nausea/vomiting while in the ICU and has not regained his appetite. Pt is getting Boost Plus but does not like it and won't take it. Chart does not show wt loss but wife and pt say he's definitely lost some wt. Intake <50% since admission. Completed NFPE: pt has mild fat depletion and muscle wasting. Pt meets criteria for acute moderate malnutrition.  Nutrition Discharge Planning: Adequate PO intake for optimal nutrition.     Nutrition Risk Screen    Nutrition Risk Screen: dysphagia or difficulty swallowing    Nutrition/Diet History    Spiritual, Cultural Beliefs, Bahai Practices, Values that Affect Care: no  Factors Affecting Nutritional Intake: None identified at this time    Anthropometrics    Temp: 98 °F (36.7 °C)  Height: 6' 3.98" (193 cm)  Height (inches): 75.98 in  Weight Method: Bed Scale  Weight: 89.4 kg (197 lb 1.5 oz)  Weight (lb): 197.09 lb  Ideal Body Weight (IBW), Male: 201.88 lb  % Ideal Body Weight, Male (lb): 97.63 lb  BMI (Calculated): 24.1  BMI Grade: 18.5-24.9 - normal       Lab/Procedures/Meds    Pertinent Labs Reviewed: reviewed  Pertinent Labs Comments: Alb 2.5, ALT 75  Pertinent Medications Reviewed: reviewed  Pertinent Medications Comments: -----------    Estimated/Assessed Needs    Weight Used For Calorie Calculations: 89.4 kg (197 lb 1.5 " oz)  Energy Calorie Requirements (kcal): 2318  Energy Need Method: [place]-St Jeor(1.25 PAL)  Protein Requirements: 89-107g (1-1.2g/kg)  Weight Used For Protein Calculations: 89.4 kg (197 lb 1.5 oz)  Fluid Requirements (mL): Per MD     RDA Method (mL): 2318         Nutrition Prescription Ordered    Current Diet Order: Regular  Oral Nutrition Supplement: Boost Plus TID    Evaluation of Received Nutrient/Fluid Intake    IV Fluid (mL): 0  Comments: LBM 7/24  Tolerance: tolerating  % Intake of Estimated Energy Needs: 25 - 50 %  % Meal Intake: 25 - 50 %    Nutrition Risk    Level of Risk/Frequency of Follow-up: moderate    Assessment and Plan     Nutrition Problem:  Moderate Protein-Calorie Malnutrition  Malnutrition in the context of Acute Illness/Injury    Related to (etiology):  Poor intake in setting of stroke/alcohol withdrawal    Signs and Symptoms (as evidenced by):  Energy Intake: <50% of estimated energy requirement for 10 days  Body Fat Depletion: mild depletion of orbitals, triceps   Muscle Mass Depletion: mild depletion of temples, lower extremities    Interventions/Recommendations (treatment strategy):  Collaboration of care to providers.    Nutrition Diagnosis Status:  New    Monitor and Evaluation    Food and Nutrient Intake: energy intake, food and beverage intake  Food and Nutrient Adminstration: diet order  Anthropometric Measurements: weight, weight change  Biochemical Data, Medical Tests and Procedures: other (specify)(All labs)  Nutrition-Focused Physical Findings: overall appearance, extremities, muscles and bones, head and eyes, skin     Malnutrition Assessment                 Orbital Region (Subcutaneous Fat Loss): mild depletion  Upper Arm Region (Subcutaneous Fat Loss): mild depletion  Thoracic and Lumbar Region: mild depletion   [place] Region (Muscle Loss): mild depletion  Clavicle Bone Region (Muscle Loss): well nourished  Clavicle and Acromion Bone Region (Muscle Loss): well  nourished  Scapular Bone Region (Muscle Loss): well nourished  Patellar Region (Muscle Loss): mild depletion  Anterior Thigh Region (Muscle Loss): mild depletion  Posterior Calf Region (Muscle Loss): mild depletion                 Nutrition Follow-Up    RD Follow-up?: Yes     Skin intact and not indurated

## 2024-11-01 NOTE — ASSESSMENT & PLAN NOTE
- s/p JM closure in 2017  - Cardiology consulted, appreciate recs   - Carotid U/S ordered to assess source of emboli    - Hold off on ABDIAS ordered for now until thrombocytopenia issue is resolved  - hold anticoagulation for now as platelet count has dropped below 100 while on heparin.  - telemetry  - Bradycardia + diaphoresis episode 7/17 at night.  Given Atropine which resolved the episode.  Potential stroke/ cardiac syndrome.    - No further episodes since.    Patient was notified and understands.